# Patient Record
Sex: FEMALE | Race: WHITE | NOT HISPANIC OR LATINO | Employment: UNEMPLOYED | URBAN - METROPOLITAN AREA
[De-identification: names, ages, dates, MRNs, and addresses within clinical notes are randomized per-mention and may not be internally consistent; named-entity substitution may affect disease eponyms.]

---

## 2018-04-01 LAB
ANION GAP SERPL CALCULATED.3IONS-SCNC: 11.7 MM/L
APTT PPP: 30.1 SEC (ref 24.4–37.6)
BASOPHILS # BLD AUTO: 0.1 X3/UL (ref 0–0.3)
BASOPHILS # BLD AUTO: 0.8 % (ref 0–2)
BILIRUB UR QL STRIP: NEGATIVE
BUN SERPL-MCNC: 16 MG/DL (ref 7–25)
CALCIUM SERPL-MCNC: 9.3 MG/DL (ref 8.6–10.5)
CHLORIDE SERPL-SCNC: 108 MM/L (ref 98–107)
CLARITY UR: CLEAR
CO2 SERPL-SCNC: 22 MM/L (ref 21–31)
COLOR UR: YELLOW
CREAT SERPL-MCNC: 0.96 MG/DL (ref 0.6–1.2)
DEPRECATED RDW RBC AUTO: 12.9 %
EGFR (HISTORICAL): > 60 GFR
EGFR AFRICAN AMERICAN (HISTORICAL): > 60 GFR
EOSINOPHIL # BLD AUTO: 0.5 X3/UL (ref 0–0.5)
EOSINOPHIL NFR BLD AUTO: 5.9 % (ref 0–5)
GLUCOSE (HISTORICAL): 103 MG/DL (ref 65–99)
GLUCOSE UR STRIP-MCNC: NEGATIVE MG/DL
HCT VFR BLD AUTO: 38.1 % (ref 37–47)
HGB BLD-MCNC: 12.9 G/DL (ref 12–16)
HGB UR QL STRIP.AUTO: NEGATIVE
INR PPP: 0.93 (ref 0.9–1.5)
KETONES UR STRIP-MCNC: NEGATIVE MG/DL
LEUKOCYTE ESTERASE UR QL STRIP: NEGATIVE
LYMPHOCYTES # BLD AUTO: 3 X3/UL (ref 1.2–4.2)
LYMPHOCYTES NFR BLD AUTO: 36.8 % (ref 20.5–51.1)
MCH RBC QN AUTO: 29.7 PG (ref 26–34)
MCHC RBC AUTO-ENTMCNC: 33.9 G/DL (ref 31–37)
MCV RBC AUTO: 87.5 FL (ref 81–99)
MONOCYTES # BLD AUTO: 0.5 X3/UL (ref 0–1)
MONOCYTES NFR BLD AUTO: 5.9 % (ref 1.7–12)
NEUTROPHILS # BLD AUTO: 4.1 X3/UL (ref 1.4–6.5)
NEUTS SEG NFR BLD AUTO: 50.6 % (ref 42.2–75.2)
NITRITE UR QL STRIP: NEGATIVE
OSMOLALITY, SERUM (HISTORICAL): 277 MOSM (ref 262–291)
PH UR STRIP.AUTO: 5.5 [PH] (ref 4.5–8)
PLATELET # BLD AUTO: 293 X3/UL (ref 130–400)
PMV BLD AUTO: 7.1 FL
POTASSIUM SERPL-SCNC: 3.7 MM/L (ref 3.5–5.5)
PROT UR STRIP-MCNC: NEGATIVE MG/DL
PROTHROMBIN TIME (HISTORICAL): 10.7 SEC (ref 10.1–12.9)
RBC # BLD AUTO: 4.35 X6/UL (ref 3.9–5.2)
SODIUM SERPL-SCNC: 138 MM/L (ref 134–143)
SP GR UR STRIP.AUTO: >= 1.03 (ref 1–1.03)
UROBILINOGEN UR QL STRIP.AUTO: 0.2 EU/DL (ref 0.2–8)
WBC # BLD AUTO: 8.2 X3/UL (ref 4.8–10.8)

## 2018-05-05 ENCOUNTER — OFFICE VISIT (OUTPATIENT)
Dept: URGENT CARE | Facility: CLINIC | Age: 48
End: 2018-05-05
Payer: COMMERCIAL

## 2018-05-05 VITALS
HEART RATE: 58 BPM | SYSTOLIC BLOOD PRESSURE: 110 MMHG | OXYGEN SATURATION: 100 % | DIASTOLIC BLOOD PRESSURE: 59 MMHG | RESPIRATION RATE: 16 BRPM | TEMPERATURE: 96.8 F

## 2018-05-05 DIAGNOSIS — H66.93 BILATERAL OTITIS MEDIA, UNSPECIFIED OTITIS MEDIA TYPE: Primary | ICD-10-CM

## 2018-05-05 PROCEDURE — 99213 OFFICE O/P EST LOW 20 MIN: CPT | Performed by: PHYSICIAN ASSISTANT

## 2018-05-05 RX ORDER — AMLODIPINE BESYLATE 5 MG/1
5 TABLET ORAL DAILY
COMMUNITY
End: 2018-09-18

## 2018-05-05 RX ORDER — TOPIRAMATE 25 MG/1
50 TABLET ORAL
COMMUNITY
End: 2019-06-12

## 2018-05-05 RX ORDER — PRAMIPEXOLE DIHYDROCHLORIDE 0.5 MG/1
0.5 TABLET ORAL 2 TIMES DAILY
COMMUNITY

## 2018-05-05 RX ORDER — AZITHROMYCIN 250 MG/1
TABLET, FILM COATED ORAL
Qty: 6 TABLET | Refills: 0 | Status: SHIPPED | OUTPATIENT
Start: 2018-05-05 | End: 2018-05-10

## 2018-05-05 RX ORDER — FLUCONAZOLE 150 MG/1
150 TABLET ORAL ONCE
Qty: 2 TABLET | Refills: 0 | Status: SHIPPED | OUTPATIENT
Start: 2018-05-05 | End: 2018-05-05

## 2018-05-05 RX ORDER — LEVOTHYROXINE SODIUM 0.1 MG/1
100 TABLET ORAL DAILY
COMMUNITY

## 2018-09-18 ENCOUNTER — OFFICE VISIT (OUTPATIENT)
Dept: URGENT CARE | Facility: CLINIC | Age: 48
End: 2018-09-18
Payer: COMMERCIAL

## 2018-09-18 VITALS
WEIGHT: 240 LBS | HEART RATE: 60 BPM | SYSTOLIC BLOOD PRESSURE: 115 MMHG | DIASTOLIC BLOOD PRESSURE: 60 MMHG | HEIGHT: 69 IN | TEMPERATURE: 96.7 F | RESPIRATION RATE: 18 BRPM | OXYGEN SATURATION: 95 % | BODY MASS INDEX: 35.55 KG/M2

## 2018-09-18 DIAGNOSIS — J40 BRONCHITIS: Primary | ICD-10-CM

## 2018-09-18 PROCEDURE — G0382 LEV 3 HOSP TYPE B ED VISIT: HCPCS | Performed by: NURSE PRACTITIONER

## 2018-09-18 PROCEDURE — 99283 EMERGENCY DEPT VISIT LOW MDM: CPT | Performed by: NURSE PRACTITIONER

## 2018-09-18 RX ORDER — TOPIRAMATE 25 MG/1
25 TABLET ORAL
COMMUNITY
Start: 2018-09-04 | End: 2018-09-18

## 2018-09-18 RX ORDER — AZITHROMYCIN 250 MG/1
TABLET, FILM COATED ORAL
Qty: 6 TABLET | Refills: 0 | Status: SHIPPED | OUTPATIENT
Start: 2018-09-18 | End: 2018-09-22

## 2018-09-18 RX ORDER — ALBUTEROL SULFATE 90 UG/1
2 AEROSOL, METERED RESPIRATORY (INHALATION) EVERY 4 HOURS PRN
Qty: 1 INHALER | Refills: 0 | Status: SHIPPED | OUTPATIENT
Start: 2018-09-18 | End: 2018-10-18

## 2018-09-18 RX ORDER — FLUCONAZOLE 150 MG/1
150 TABLET ORAL ONCE
Qty: 1 TABLET | Refills: 0 | Status: SHIPPED | OUTPATIENT
Start: 2018-09-18 | End: 2018-09-18

## 2018-09-18 RX ORDER — AMLODIPINE BESYLATE 5 MG/1
5 TABLET ORAL
COMMUNITY
Start: 2018-04-06 | End: 2019-04-01

## 2018-09-18 RX ORDER — PRAMIPEXOLE DIHYDROCHLORIDE 0.5 MG/1
0.5 TABLET ORAL
COMMUNITY
Start: 2018-07-02 | End: 2018-09-18

## 2018-09-18 RX ORDER — LEVOTHYROXINE SODIUM 0.1 MG/1
100 TABLET ORAL
COMMUNITY
Start: 2018-09-12 | End: 2018-09-18

## 2018-09-18 NOTE — PROGRESS NOTES
[facility]        NAME: Rudolph Gonzalez is a 57 y o  female  : 1970    MRN: [id number]  DATE: 2018  TIME: 11:28 AM    Assessment and Plan   Bronchitis [J40]  1  Bronchitis  azithromycin (ZITHROMAX) 250 mg tablet    fluconazole (DIFLUCAN) 150 mg tablet    albuterol (PROVENTIL HFA,VENTOLIN HFA) 90 mcg/act inhaler         Patient Instructions     Mucinex DM as directed  Follow up with PCP in 3-5 days  Proceed to  ER if symptoms worsen  Chief Complaint     Chief Complaint   Patient presents with    Cold Like Symptoms     c/o cough and pain in her chest when she coughs started a couple of days ago         History of Present Illness       Cough   Episode onset: 2-3 days  The problem has been gradually worsening  The cough is productive of purulent sputum  Associated symptoms include nasal congestion, shortness of breath and wheezing  She has tried a beta-agonist inhaler and OTC cough suppressant for the symptoms  The treatment provided mild relief  Her past medical history is significant for bronchitis  +smoker       Review of Systems   Review of Systems   Constitutional: Negative  HENT: Negative  Eyes: Negative  Respiratory: Positive for cough, shortness of breath and wheezing  Cardiovascular: Negative  Gastrointestinal: Negative  Endocrine: Negative  Genitourinary: Negative  Musculoskeletal: Negative  Neurological: Negative  Hematological: Negative            Current Medications       Current Outpatient Prescriptions:     amLODIPine (NORVASC) 5 mg tablet, Take 5 mg by mouth, Disp: , Rfl:     levothyroxine 100 mcg tablet, Take 100 mcg by mouth daily, Disp: , Rfl:     pramipexole (MIRAPEX) 0 5 mg tablet, Take 0 25 mg by mouth 3 (three) times a day, Disp: , Rfl:     topiramate (TOPAMAX) 25 mg tablet, Take 25 mg by mouth 2 (two) times a day, Disp: , Rfl:     albuterol (PROVENTIL HFA,VENTOLIN HFA) 90 mcg/act inhaler, Inhale 2 puffs every 4 (four) hours as needed for wheezing or shortness of breath for up to 30 days, Disp: 1 Inhaler, Rfl: 0    azithromycin (ZITHROMAX) 250 mg tablet, Take 2 tablets today then 1 tablet daily x 4 days, Disp: 6 tablet, Rfl: 0    fluconazole (DIFLUCAN) 150 mg tablet, Take 1 tablet (150 mg total) by mouth once for 1 dose, Disp: 1 tablet, Rfl: 0    Current Allergies     Allergies as of 09/18/2018 - Reviewed 09/18/2018   Allergen Reaction Noted    Amoxicillin  05/05/2018    Codeine Other (See Comments) 05/05/2018    Etodolac Other (See Comments) 06/01/2018    Penicillins Other (See Comments) 05/05/2018    Sulfa antibiotics Other (See Comments) 05/05/2018            The following portions of the patient's history were reviewed and updated as appropriate: allergies, current medications, past family history, past medical history, past social history, past surgical history and problem list      Past Medical History:   Diagnosis Date    Disease of thyroid gland     Hypertension        No past surgical history on file  No family history on file  Medications have been verified  Objective   /60   Pulse 60   Temp (!) 96 7 °F (35 9 °C)   Resp 18   Ht 5' 9" (1 753 m)   Wt 109 kg (240 lb)   SpO2 95%   BMI 35 44 kg/m²        Physical Exam     Physical Exam   Constitutional: She is oriented to person, place, and time  She appears well-developed and well-nourished  No distress  HENT:   Head: Normocephalic and atraumatic  Right Ear: External ear normal    Left Ear: External ear normal    Nose: Nose normal    Mouth/Throat: Oropharynx is clear and moist    Eyes: Conjunctivae and EOM are normal  Pupils are equal, round, and reactive to light  Neck: Normal range of motion  Neck supple  Cardiovascular: Normal rate, regular rhythm and normal heart sounds  Pulmonary/Chest: Effort normal  She has wheezes in the right lower field and the left lower field  Abdominal: Soft   Bowel sounds are normal    Lymphadenopathy: She has cervical adenopathy  Neurological: She is alert and oriented to person, place, and time  She has normal reflexes  Skin: Skin is warm and dry  She is not diaphoretic  Psychiatric: She has a normal mood and affect  Her behavior is normal  Judgment and thought content normal    Nursing note and vitals reviewed

## 2018-09-18 NOTE — PATIENT INSTRUCTIONS
Acute Bronchitis   WHAT YOU NEED TO KNOW:   Acute bronchitis is swelling and irritation in the air passages of your lungs  This irritation may cause you to cough or have other breathing problems  Acute bronchitis often starts because of another illness, such as a cold or the flu  The illness spreads from your nose and throat to your windpipe and airways  Bronchitis is often called a chest cold  Acute bronchitis lasts about 3 to 6 weeks and is usually not a serious illness  Your cough can last for several weeks  DISCHARGE INSTRUCTIONS:   Return to the emergency department if:   · You cough up blood  · Your lips or fingernails turn blue  · You feel like you are not getting enough air when you breathe  Contact your healthcare provider if:   · You have a fever  · Your breathing problems do not go away or get worse  · Your cough does not get better within 4 weeks  · You have questions or concerns about your condition or care  Self-care:   · Get more rest   Rest helps your body to heal  Slowly start to do more each day  Rest when you feel it is needed  · Avoid irritants in the air  Avoid chemicals, fumes, and dust  Wear a face mask if you must work around dust or fumes  Stay inside on days when air pollution levels are high  If you have allergies, stay inside when pollen counts are high  Do not use aerosol products, such as spray-on deodorant, bug spray, and hair spray  · Do not smoke or be around others who smoke  Nicotine and other chemicals in cigarettes and cigars damages the cilia that move mucus out of your lungs  Ask your healthcare provider for information if you currently smoke and need help to quit  E-cigarettes or smokeless tobacco still contain nicotine  Talk to your healthcare provider before you use these products  · Drink liquids as directed  Liquids help keep your air passages moist and help you cough up mucus   You may need to drink more liquids when you have acute bronchitis  Ask how much liquid to drink each day and which liquids are best for you  · Use a humidifier or vaporizer  Use a cool mist humidifier or a vaporizer to increase air moisture in your home  This may make it easier for you to breathe and help decrease your cough  Decrease risk for acute bronchitis:   · Get the vaccinations you need  Ask your healthcare provider if you should get vaccinated against the flu or pneumonia  · Prevent the spread of germs  You can decrease your risk of acute bronchitis and other illnesses by doing the following:     [facility] your hands often with soap and water  Carry germ-killing hand lotion or gel with you  You can use the lotion or gel to clean your hands when soap and water are not available  ¨ Do not touch your eyes, nose, or mouth unless you have washed your hands first     ¨ Always cover your mouth when you cough to prevent the spread of germs  It is best to cough into a tissue or your shirt sleeve instead of into your hand  Ask those around you cover their mouths when they cough  ¨ Try to avoid people who have a cold or the flu  If you are sick, stay away from others as much as possible  Medicines: Your healthcare provider may  give you any of the following:  · Ibuprofen or acetaminophen  are medicines that help lower your fever  They are available without a doctor's order  Ask your healthcare provider which medicine is right for you  Ask how much to take and how often to take it  Follow directions  These medicines can cause stomach bleeding if not taken correctly  Ibuprofen can cause kidney damage  Do not take ibuprofen if you have kidney disease, an ulcer, or allergies to aspirin  Acetaminophen can cause liver damage  Do not take more than 4,000 milligrams in 24 hours  · Decongestants  help loosen mucus in your lungs and make it easier to cough up  This can help you breathe easier  · Cough suppressants  decrease your urge to cough   If your cough produces mucus, do not take a cough suppressant unless your healthcare provider tells you to  Your healthcare provider may suggest that you take a cough suppressant at night so you can rest     · Inhalers  may be given  Your healthcare provider may give you one or more inhalers to help you breathe easier and cough less  An inhaler gives your medicine to open your airways  Ask your healthcare provider to show you how to use your inhaler correctly  · Take your medicine as directed  Contact your healthcare provider if you think your medicine is not helping or if you have side effects  Tell him of her if you are allergic to any medicine  Keep a list of the medicines, vitamins, and herbs you take  Include the amounts, and when and why you take them  Bring the list or the pill bottles to follow-up visits  Carry your medicine list with you in case of an emergency  Follow up with your healthcare provider as directed:  Write down questions you have so you will remember to ask them during your follow-up visits  © 2017 [employer] Information is for End User's use only and may not be sold, redistributed or otherwise used for commercial purposes  All illustrations and images included in CareNotes® are the copyrighted property of [employer]  or [employer]  The above information is an  only  It is not intended as medical advice for individual conditions or treatments  Talk to your doctor, nurse or pharmacist before following any medical regimen to see if it is safe and effective for you

## 2018-09-19 ENCOUNTER — APPOINTMENT (OUTPATIENT)
Dept: RADIOLOGY | Facility: CLINIC | Age: 48
End: 2018-09-19
Payer: COMMERCIAL

## 2018-09-19 ENCOUNTER — OFFICE VISIT (OUTPATIENT)
Dept: URGENT CARE | Facility: CLINIC | Age: 48
End: 2018-09-19
Payer: COMMERCIAL

## 2018-09-19 VITALS
TEMPERATURE: 97.5 F | HEART RATE: 67 BPM | OXYGEN SATURATION: 98 % | RESPIRATION RATE: 18 BRPM | SYSTOLIC BLOOD PRESSURE: 125 MMHG | DIASTOLIC BLOOD PRESSURE: 66 MMHG

## 2018-09-19 DIAGNOSIS — J20.0 ACUTE BRONCHITIS DUE TO MYCOPLASMA PNEUMONIAE: Primary | ICD-10-CM

## 2018-09-19 DIAGNOSIS — H73.012 BULLOUS MYRINGITIS OF LEFT EAR: ICD-10-CM

## 2018-09-19 DIAGNOSIS — R05.9 COUGH: ICD-10-CM

## 2018-09-19 PROCEDURE — 99283 EMERGENCY DEPT VISIT LOW MDM: CPT | Performed by: PHYSICIAN ASSISTANT

## 2018-09-19 PROCEDURE — G0382 LEV 3 HOSP TYPE B ED VISIT: HCPCS | Performed by: PHYSICIAN ASSISTANT

## 2018-09-19 PROCEDURE — 94640 AIRWAY INHALATION TREATMENT: CPT | Performed by: PHYSICIAN ASSISTANT

## 2018-09-19 PROCEDURE — 71046 X-RAY EXAM CHEST 2 VIEWS: CPT

## 2018-09-19 RX ORDER — PREDNISONE 10 MG/1
TABLET ORAL
Qty: 26 TABLET | Refills: 0 | Status: SHIPPED | OUTPATIENT
Start: 2018-09-19 | End: 2019-01-14

## 2018-09-19 RX ORDER — IPRATROPIUM BROMIDE AND ALBUTEROL SULFATE 2.5; .5 MG/3ML; MG/3ML
3 SOLUTION RESPIRATORY (INHALATION) ONCE
Status: COMPLETED | OUTPATIENT
Start: 2018-09-19 | End: 2018-09-19

## 2018-09-19 RX ADMIN — IPRATROPIUM BROMIDE AND ALBUTEROL SULFATE 3 ML: 2.5; .5 SOLUTION RESPIRATORY (INHALATION) at 19:55

## 2018-09-19 NOTE — PROGRESS NOTES
[facility]    NAME: Morgan Maya is a 57 y o  female  : 1970    MRN: [id number]  DATE: 2018  TIME: 8:12 PM    Assessment and Plan   Acute bronchitis due to Mycoplasma pneumoniae [J20 0]  1  Acute bronchitis due to Mycoplasma pneumoniae  predniSONE 10 mg tablet   2  Cough  XR chest pa & lateral    ipratropium-albuterol (DUO-NEB) 0 5-2 5 mg/3 mL inhalation solution 3 mL   3  Bullous myringitis of left ear     Patient was given DuoNeb treatment in the office today  Pulse ox prior to treatment was 98%  Post treatment was 99%  Patient reported improved symptoms post treatment  Exam revealed lungs CTA  Patient Instructions   Patient Instructions   Continue antibiotic  Start prednisone and take as directed  Use albuterol inhaler every 4-6 hours as needed  If symptoms are worsening, go to the emergency room  X-ray appears negative  Will follow up with radiologist report when available  Suspect mycoplasma due to bullous myringitis  Chief Complaint     Chief Complaint   Patient presents with    Cough     Pt c/o a cough and congestion  History of Present Illness   53-year-old female here with complaint of worsening cough, shortness of breath and chest tightness  Patient states she started Zithromax yesterday  Does not feel like her albuterol inhaler is helping much  She used it 5 times in the last hour or 2 and does not feel like it helped  Cough is productive at times  Also has a slight left earache  Review of Systems   Review of Systems   Constitutional: Positive for fatigue  Negative for activity change, appetite change, chills, diaphoresis, fever and unexpected weight change  HENT: Positive for congestion, ear pain and sore throat  Negative for dental problem, hearing loss, sinus pressure, sneezing, tinnitus, trouble swallowing and voice change  Eyes: Negative for photophobia, redness and visual disturbance     Respiratory: Positive for cough, chest tightness, shortness of breath and wheezing  Negative for apnea and stridor  Cardiovascular: Negative for chest pain, palpitations and leg swelling  Gastrointestinal: Negative for abdominal distention, abdominal pain, blood in stool, constipation, diarrhea, nausea and vomiting  Endocrine: Negative for cold intolerance, heat intolerance, polydipsia, polyphagia and polyuria  Genitourinary: Negative for difficulty urinating, dysuria, flank pain, frequency, hematuria and urgency  Musculoskeletal: Negative for arthralgias, back pain, gait problem, joint swelling, myalgias, neck pain and neck stiffness  Skin: Negative for pallor, rash and wound  Neurological: Negative for dizziness, tremors, seizures, speech difficulty, weakness and headaches  Hematological: Negative for adenopathy  Does not bruise/bleed easily  Psychiatric/Behavioral: Negative for agitation, confusion, dysphoric mood and sleep disturbance  The patient is not nervous/anxious  All other systems reviewed and are negative  Current Medications     Current Outpatient Prescriptions:     albuterol (PROVENTIL HFA,VENTOLIN HFA) 90 mcg/act inhaler, Inhale 2 puffs every 4 (four) hours as needed for wheezing or shortness of breath for up to 30 days, Disp: 1 Inhaler, Rfl: 0    amLODIPine (NORVASC) 5 mg tablet, Take 5 mg by mouth, Disp: , Rfl:     azithromycin (ZITHROMAX) 250 mg tablet, Take 2 tablets today then 1 tablet daily x 4 days, Disp: 6 tablet, Rfl: 0    levothyroxine 100 mcg tablet, Take 100 mcg by mouth daily, Disp: , Rfl:     pramipexole (MIRAPEX) 0 5 mg tablet, Take 0 25 mg by mouth 3 (three) times a day, Disp: , Rfl:     predniSONE 10 mg tablet, Take 3 tabs BID X 2 days, 2 tabs BID X 2 days, 1 tab BID X 2 days, 1 tab daily X 2 days, Disp: 26 tablet, Rfl: 0    topiramate (TOPAMAX) 25 mg tablet, Take 25 mg by mouth 2 (two) times a day, Disp: , Rfl:   No current facility-administered medications for this visit  Current Allergies     Allergies as of 09/19/2018 - Reviewed 09/19/2018   Allergen Reaction Noted    Amoxicillin  05/05/2018    Codeine Other (See Comments) 05/05/2018    Etodolac Other (See Comments) 06/01/2018    Penicillins Other (See Comments) 05/05/2018    Sulfa antibiotics Other (See Comments) 05/05/2018          The following portions of the patient's history were reviewed and updated as appropriate: allergies, current medications, past family history, past medical history, past social history, past surgical history and problem list    Past Medical History:   Diagnosis Date    Disease of thyroid gland     Hypertension      No past surgical history on file  No family history on file  Social History     Social History    Marital status: Legally      Spouse name: N/A    Number of children: N/A    Years of education: N/A     Occupational History    Not on file  Social History Main Topics    Smoking status: Not on file    Smokeless tobacco: Not on file    Alcohol use Not on file    Drug use: Unknown    Sexual activity: Not on file     Other Topics Concern    Not on file     Social History Narrative    No narrative on file     Medications have been verified  Objective   /66   Pulse 67   Temp 97 5 °F (36 4 °C)   Resp 18   SpO2 98%      Physical Exam   Physical Exam   Constitutional: She appears well-developed and well-nourished  No distress  HENT:   Head: Normocephalic  Right Ear: Tympanic membrane and external ear normal    Left Ear: External ear normal  Tympanic membrane is erythematous (bullous myringitis)  Nose: Mucosal edema present  Mouth/Throat: Posterior oropharyngeal erythema present  No oropharyngeal exudate  Neck: Normal range of motion  Neck supple  Cardiovascular: Normal rate, regular rhythm and normal heart sounds  No murmur heard  Pulmonary/Chest: Effort normal  No respiratory distress  She has wheezes (scattered)  She has no rales  Abdominal: Soft  Bowel sounds are normal  There is no tenderness  Musculoskeletal: Normal range of motion  Lymphadenopathy:     She has no cervical adenopathy  Skin: Skin is warm  No rash noted  Nursing note and vitals reviewed

## 2018-09-20 NOTE — PATIENT INSTRUCTIONS
Continue antibiotic  Start prednisone and take as directed  Use albuterol inhaler every 4-6 hours as needed  If symptoms are worsening, go to the emergency room  X-ray appears negative  Will follow up with radiologist report when available  Suspect mycoplasma due to bullous myringitis

## 2019-01-09 ENCOUNTER — TRANSCRIBE ORDERS (OUTPATIENT)
Dept: NEUROSURGERY | Facility: CLINIC | Age: 49
End: 2019-01-09

## 2019-01-09 DIAGNOSIS — M54.2 NECK PAIN: Primary | ICD-10-CM

## 2019-01-14 ENCOUNTER — OFFICE VISIT (OUTPATIENT)
Dept: NEUROSURGERY | Facility: CLINIC | Age: 49
End: 2019-01-14
Payer: COMMERCIAL

## 2019-01-14 VITALS
HEIGHT: 69 IN | SYSTOLIC BLOOD PRESSURE: 146 MMHG | WEIGHT: 275 LBS | BODY MASS INDEX: 40.73 KG/M2 | HEART RATE: 77 BPM | DIASTOLIC BLOOD PRESSURE: 82 MMHG | RESPIRATION RATE: 16 BRPM

## 2019-01-14 DIAGNOSIS — G56.20 ULNAR NEUROPATHY, UNSPECIFIED LATERALITY: ICD-10-CM

## 2019-01-14 DIAGNOSIS — M54.2 NECK PAIN: ICD-10-CM

## 2019-01-14 DIAGNOSIS — M54.2 CERVICALGIA: Primary | ICD-10-CM

## 2019-01-14 DIAGNOSIS — M54.12 RADICULOPATHY, CERVICAL: ICD-10-CM

## 2019-01-14 PROCEDURE — 99244 OFF/OP CNSLTJ NEW/EST MOD 40: CPT | Performed by: NEUROLOGICAL SURGERY

## 2019-01-14 RX ORDER — CHOLECALCIFEROL (VITAMIN D3) 50 MCG
1 TABLET ORAL
COMMUNITY
End: 2019-04-26

## 2019-01-14 RX ORDER — FERROUS SULFATE 325(65) MG
325 TABLET ORAL
COMMUNITY

## 2019-01-14 RX ORDER — GABAPENTIN 300 MG/1
300 CAPSULE ORAL
COMMUNITY
Start: 2018-12-28 | End: 2019-04-01

## 2019-01-14 RX ORDER — METHOCARBAMOL 500 MG/1
500 TABLET, FILM COATED ORAL 3 TIMES DAILY PRN
COMMUNITY
End: 2019-04-10

## 2019-01-14 NOTE — PROGRESS NOTES
Assessment/Plan:    No problem-specific Assessment & Plan notes found for this encounter  Problem List Items Addressed This Visit     None      Visit Diagnoses     Cervicalgia    -  Primary    Relevant Orders    EMG 2 Limb Upper Extremity    XR spine cervical complete 6+ vw flex/ext/obl    Ambulatory referral to Pain Management    Ambulatory referral to Physical Therapy    Neck pain        Relevant Orders    EMG 2 Limb Upper Extremity    XR spine cervical complete 6+ vw flex/ext/obl    Ambulatory referral to Pain Management    Ambulatory referral to Physical Therapy    Radiculopathy, cervical        Relevant Orders    EMG 2 Limb Upper Extremity    XR spine cervical complete 6+ vw flex/ext/obl    Ambulatory referral to Pain Management    Ambulatory referral to Physical Therapy    Ulnar neuropathy, unspecified laterality        Relevant Orders    EMG 2 Limb Upper Extremity    XR spine cervical complete 6+ vw flex/ext/obl    Ambulatory referral to Pain Management    Ambulatory referral to Physical Therapy            Subjective:      Patient ID: Deanna Dent is a 57 y o  female  HPI    The following portions of the patient's history were reviewed and updated as appropriate: allergies, current medications, past family history, past medical history, past social history, past surgical history and problem list     Review of Systems   Constitutional: Positive for fatigue  Negative for chills and fever  HENT: Negative  Eyes: Negative for pain and visual disturbance  Respiratory: Negative for cough, shortness of breath and wheezing  Cardiovascular: Negative for chest pain and palpitations  Gastrointestinal: Positive for nausea  Negative for abdominal pain  Genitourinary: Negative for difficulty urinating  Musculoskeletal: Positive for arthralgias, neck pain and neck stiffness  Negative for back pain and gait problem  Neurological: Positive for numbness and headaches   Negative for dizziness, speech difficulty and weakness  Objective:      /82 (BP Location: Left arm, Patient Position: Sitting, Cuff Size: Standard)   Pulse 77   Resp 16   Ht 5' 9" (1 753 m)   Wt 125 kg (275 lb)   BMI 40 61 kg/m²          Physical Exam      HPI    3 months hx of neck pain and stiffness associated with right shoulder and arm pain/paresthesia radiating to ulnar fingers  Denies weakness, loss of fine motor, gait disturbance  NATACHA x 2 without durable relief   and car provider  Exam    Moderate restriction in cervical ROM  Paravertebral spasm  Negative spurling's  Full strength bilateral UE and LE  Grossly intact sensation and DTR  Intact fine motor and coordination  Normal gait and tandem  Positive tinel's cubital tunnel right side    Radiology    C4-7 disc degeneration, markedly so at C5/6 with concentric disc bulge and collapse with bilateral foraminal encroachment  Thecal sac indentation without cord compression    Summary and Plan     Mrs Barger has neck pain and arm pain  We reviewed the conservative medical options including PT, NATACHA and medications  I explained to her that although she does indeed have C5/6 disc degeneration, the pattern of her symptom is somewhat atypical for a classic C6 radiculopathy  I have provided her with a script for PT, order a flex/ext xray, EMG and referred her to Dr Letitia Jane for further appraisal  In the meantime, I asked her to avoid axial loading, vigorous head and neck movement and heavy lifting  I will see her in fu in 3 months

## 2019-01-22 ENCOUNTER — EVALUATION (OUTPATIENT)
Dept: PHYSICAL THERAPY | Facility: CLINIC | Age: 49
End: 2019-01-22
Payer: COMMERCIAL

## 2019-01-22 DIAGNOSIS — G56.20 ULNAR NEUROPATHY, UNSPECIFIED LATERALITY: ICD-10-CM

## 2019-01-22 DIAGNOSIS — M54.2 NECK PAIN: Primary | ICD-10-CM

## 2019-01-22 DIAGNOSIS — M54.12 RADICULOPATHY, CERVICAL: ICD-10-CM

## 2019-01-22 DIAGNOSIS — M54.2 CERVICALGIA: ICD-10-CM

## 2019-01-22 PROCEDURE — 97535 SELF CARE MNGMENT TRAINING: CPT | Performed by: PHYSICAL THERAPIST

## 2019-01-22 PROCEDURE — G8982 BODY POS GOAL STATUS: HCPCS | Performed by: PHYSICAL THERAPIST

## 2019-01-22 PROCEDURE — 97162 PT EVAL MOD COMPLEX 30 MIN: CPT | Performed by: PHYSICAL THERAPIST

## 2019-01-22 PROCEDURE — G8981 BODY POS CURRENT STATUS: HCPCS | Performed by: PHYSICAL THERAPIST

## 2019-01-22 NOTE — PROGRESS NOTES
PT Evaluation     Today's date: 2019  Patient name: Vashti Byers  : 1970  MRN: [id number]  Referring provider: Tiffanie Jensen MD  Dx:   Encounter Diagnosis     ICD-10-CM    1  Neck pain M54 2    2  Cervicalgia M54 2    3  Radiculopathy, cervical M54 12    4  Ulnar neuropathy, unspecified laterality G56 20        Start Time: 1300  Stop Time: 1400  Total time in clinic (min): 60 minutes    Assessment  Assessment details: Vashti Byers was seen for an initial PT evaluation today  Patient is a 57 y o  female with diagnosis of cervical radiculopathy into ulnar nerve distribution and past medical history significant for left ankle reconstruction, depression, obesity  Moderate complexity evaluation  due to number of participation restrictions, functional outcome measure of 52% limitation, and changing clinical presentation  Findings today show limited cervical and shoulder AROM with right shoulder and  weakness, guarding and tightness of cervical and shoulder musculature, as well as neural tension of ulnar nerve  Skilled PT indicated to treat at this time  Impairments: abnormal muscle firing, abnormal muscle tone, abnormal or restricted ROM, activity intolerance, impaired physical strength, lacks appropriate home exercise program, pain with function, poor posture  and poor body mechanics    Goals  STG (4 weeks)  1  Patient will display good seated posture with decreased forward head and retracted shoulders for 2 consecutive sessions with 50% VC    2  Patient will have 0/10 cervical pain at rest    3  Improved right  strength to equal bilaterally for improved ADL function  LTG (8 weeks)  1  Patient will have <2 HA in 2 weeks  2  Patient will be able to care for disabled daughter with <4/10 pain for return to PLOF  3  Patient will be independent with home exercise program for continued maintenance post PT DC         Plan  Patient would benefit from: skilled physical therapy  Planned modality interventions: unattended electrical stimulation, thermotherapy: hydrocollator packs and cryotherapy  Planned therapy interventions: manual therapy, patient education, therapeutic activities, therapeutic exercise, home exercise program and neuromuscular re-education  Frequency: 2-3x week  Duration in weeks: 8  Plan of Care beginning date: 2019  Plan of Care expiration date: 3/22/2019  Treatment plan discussed with: PTA and patient        Subjective Evaluation    History of Present Illness  Mechanism of injury: Patient presents to outpatient PT with c/o cervical and right arm pain  Symptoms began 2018  Patient states she was lying on couch and felt a pop and had warmth go into head with difficulty moving  This lasted approximately 15 minutes and patient went to ED  Patient then saw neurosurgeon who referred patient to pain management and PT  Has had 2 injections only lasting approximately 1 week at a time  Changed neurosurgeons recently and was sent to begin PT and an EMG early February  Will also be switching main management, new pain management to administer injection once able  Will f/u with neurosurgeon in 2019  Imaging (+) C4-7 disc degeneration, markedly so at C5/6 with concentric disc bulge and collapse with bilateral foraminal encroachment  Thecal sac indentation without cord compression    Restrictions per MD:Avoid axial loading, vigorous head and neck movement and heavy lifting  Not a recurrent problem   Pain  Current pain ratin  At best pain rating: 3  At worst pain ratin  Location: sub occipital region down between shoulder blades and into right shoulder   Numbness at medial rigth UE to 4th and 5th digits  Quality: burning, radiating and knife-like  Relieving factors: medications and heat  Aggravating factors: lifting  Progression: no change    Social Support  Steps to enter house: yes  3  Stairs in house: yes   24  Lives in: multiple-level home  Lives with: adult children and significant other    Employment status: not working (care of disabled daughter)  Hand dominance: right  Exercise history: Crossfit workouts prior to injury  Life stress: Disabled daughter    Treatments  Previous treatment: injection treatment and medication  Patient Goals  Patient goals for therapy: decreased pain and increased motion  Patient goal: "I want to get some of my life back"  Would like to return to crossfit  Objective     Special Questions  Positive for dizziness and headaches  Negative for faints, tinnitus, trouble swallowing and visual change    Postural Observations  Seated posture: poor  Standing posture: fair    Additional Postural Observation Details  Upper crossed    Palpation     Right   Muscle spasm in the levator scapulae and upper trapezius  Neurological Testing     Sensation   Cervical/Thoracic   Left   Intact: light touch    Right   Diminished: light touch    Comments   Right light touch: C8  Active Range of Motion   Cervical/Thoracic Spine   Cervical    Flexion: 60 (pain into T/S) degrees with pain  Extension: 40 degrees   Left lateral flexion: 32 degrees   Right lateral flexion: 34 degrees   Left rotation: 75 degrees   Right rotation: 70 degrees with pain  Left Shoulder   Flexion: 157 degrees   Abduction: 156 (pull between scapulas) degrees     Right Shoulder   Flexion: 143 degrees   Abduction: 146 degrees     Additional Active Range of Motion Details  IR and ER BTH/BTB equal and WNL bilaterally       Joint Play   Comments: Cervical PA and side glide mobility WNL  Bilateral 1st rib mobility WNL    Strength/Myotome Testing     Left Shoulder     Planes of Motion   Flexion: 4+   Abduction: 4   External rotation at 0°: 5   Internal rotation at 0°: 5     Right Shoulder     Planes of Motion   Flexion: 5   Abduction: 4+   External rotation at 0°: 5   Internal rotation at 0°: 5     Left Wrist/Hand      (2nd hand position)     Trial 1: 72    Trial 2: 69    Trial 3: 62    Right Wrist/Hand      (2nd hand position)     Trial 1: 62    Trial 2: 59    Trial 3: 54    Additional Strength Details  Bilateral elbow flexion and extension strength 5/5    Tests   Cervical     Left   Positive Spurling's sign  Right   Positive Spurling's sign  Negative cervical distraction  Right Shoulder   Positive ULTT4         Flowsheet Rows      Most Recent Value   PT/OT G-Codes   Current Score  48   Projected Score  62   FOTO information reviewed  Yes   Assessment Type  Evaluation   G code set  Changing & Maintaining Body Position   Changing and Maintaining Body Position Current Status ()  CK   Changing and Maintaining Body Position Goal Status ()  CJ          Precautions: None noted  Re-evaluation: 2/22    Shoulder Specialty Daily Treatment Diary     Manual  1/22                                                   Exercise Diary  1/22       Shoulder rolls 30x       Scap retraction 30x       Chin tucks 10x       UT stretch 3x30"       LS stretch 3x30"       Ulnar nerve flossing 10x                                                                                                                                   Modalities                                  Kellie Hoang was given a handout and verbal instruction of customized home exercise program  Patient accepted program and was able to demonstrate exercises

## 2019-01-29 ENCOUNTER — OFFICE VISIT (OUTPATIENT)
Dept: PHYSICAL THERAPY | Facility: CLINIC | Age: 49
End: 2019-01-29
Payer: COMMERCIAL

## 2019-01-29 DIAGNOSIS — M54.12 RADICULOPATHY, CERVICAL: ICD-10-CM

## 2019-01-29 DIAGNOSIS — M54.2 NECK PAIN: Primary | ICD-10-CM

## 2019-01-29 DIAGNOSIS — G56.20 ULNAR NEUROPATHY, UNSPECIFIED LATERALITY: ICD-10-CM

## 2019-01-29 DIAGNOSIS — M54.2 CERVICALGIA: ICD-10-CM

## 2019-01-29 PROCEDURE — 97140 MANUAL THERAPY 1/> REGIONS: CPT | Performed by: PHYSICAL THERAPIST

## 2019-01-29 PROCEDURE — 97110 THERAPEUTIC EXERCISES: CPT | Performed by: PHYSICAL THERAPIST

## 2019-01-29 PROCEDURE — 97014 ELECTRIC STIMULATION THERAPY: CPT | Performed by: PHYSICAL THERAPIST

## 2019-01-29 PROCEDURE — G0283 ELEC STIM OTHER THAN WOUND: HCPCS | Performed by: PHYSICAL THERAPIST

## 2019-01-29 NOTE — PROGRESS NOTES
Daily Note     Today's date: 2019  Patient name: Shahnaz Galvan  : 1970  MRN: [id number]  Referring provider: Ericka Hernandez MD  Dx:   Encounter Diagnosis     ICD-10-CM    1  Neck pain M54 2    2  Cervicalgia M54 2    3  Radiculopathy, cervical M54 12    4  Ulnar neuropathy, unspecified laterality G56 20        Start Time: 0850  Stop Time: 1000  Total time in clinic (min): 70 minutes    Subjective: Patient states she has been consistent with HEP, but gets pain during shoulder rolls and scap retractions along medial border of L>R scapula  Objective: See treatment diary below  Precautions: None noted  Re-evaluation:     Shoulder Specialty Daily Treatment Diary     Manual        PA T/S mobs   Grade III-IV      Scapular mobs bilat  performed                                  Exercise Diary        UBE  Standing 100/90 5' fwd      Shoulder rolls 30x 10x      Scap retraction 30x 10x      Chin tucks 10x 10x      UT stretch 3x30" 4x15"      LS stretch 3x30" 4x15"      Ulnar nerve flossing 10x 10x      Seated thoracic extension  10x      Doorway pec stretch  4x15"                                                                                                                  Modalities        Premod with MH  Post supine hooklying with wedge 10'                        - Shahnaz Galvan was given a handout and verbal instruction of customized home exercise program  Patient accepted program and was able to demonstrate exercises  Assessment: Significant limitation in both scapular and thoracic TP mobility impacting overall scapulothoracic rhythm possibly impacting cervical symptoms with over activation of cervical accessory musculature  Plan: Continue per plan of care  Progress treatment as tolerated

## 2019-01-31 ENCOUNTER — OFFICE VISIT (OUTPATIENT)
Dept: PHYSICAL THERAPY | Facility: CLINIC | Age: 49
End: 2019-01-31
Payer: COMMERCIAL

## 2019-01-31 DIAGNOSIS — M54.2 CERVICALGIA: ICD-10-CM

## 2019-01-31 DIAGNOSIS — G56.20 ULNAR NEUROPATHY, UNSPECIFIED LATERALITY: ICD-10-CM

## 2019-01-31 DIAGNOSIS — M54.2 NECK PAIN: Primary | ICD-10-CM

## 2019-01-31 DIAGNOSIS — M54.12 RADICULOPATHY, CERVICAL: ICD-10-CM

## 2019-01-31 PROCEDURE — 97110 THERAPEUTIC EXERCISES: CPT | Performed by: PHYSICAL THERAPIST

## 2019-01-31 PROCEDURE — 97014 ELECTRIC STIMULATION THERAPY: CPT | Performed by: PHYSICAL THERAPIST

## 2019-01-31 PROCEDURE — 97140 MANUAL THERAPY 1/> REGIONS: CPT | Performed by: PHYSICAL THERAPIST

## 2019-01-31 PROCEDURE — G0283 ELEC STIM OTHER THAN WOUND: HCPCS | Performed by: PHYSICAL THERAPIST

## 2019-01-31 NOTE — PROGRESS NOTES
Daily Note     Today's date: 2019  Patient name: Chadwick Lewis  : 1970  MRN: [id number]  Referring provider: Karen Gutierrez MD  Dx:   Encounter Diagnosis     ICD-10-CM    1  Neck pain M54 2    2  Cervicalgia M54 2    3  Radiculopathy, cervical M54 12    4  Ulnar neuropathy, unspecified laterality G56 20        Start Time: 1100  Stop Time: 1155  Total time in clinic (min): 55 minutes    Subjective: Patient states she was very sore the night after last session, but had significant decrease in symptoms the following am  Symptoms did gradually return the next afternoon  Objective: See treatment diary below  Precautions: None noted  Re-evaluation:     Shoulder Specialty Daily Treatment Diary     Manual       PA T/S mobs   Grade III-IV Grade III-IV     Scapular mobs bilat  performed Performed      [facility]                        Exercise Diary       UBE  Standing 100/90 5' fwd Standing 100/90 4' fwd, 2' retro (increase fwd, dc retro)    Shoulder rolls 30x 10x 30x     Scap retraction 30x 10x 15x     Chin tucks 10x 10x 20x     UT stretch 3x30" 4x15" 4x15"     LS stretch 3x30" 4x15" 4x15"     Ulnar nerve flossing 10x 10x 10x     Seated thoracic extension  10x 10x     Doorway pec stretch  4x15" 4x15"     Bilateral shoulder ER   10x light blue                                                                                                         Modalities       Premod with MH  Post supine hooklying with wedge 10' Post supine hooklying with wedge 10'                       - Chadwick Lewis was given a handout and verbal instruction of customized home exercise program  Patient accepted program and was able to demonstrate exercises  Increased ulnar n  symptoms with retro UBE  Assessment: Patient does show improvement today with increased mobility of scapulas and thoracic spine, but still significant limitation impacting overall symptoms  Continuation of skilled PT indicated  Plan: Continue per plan of care  Progress treatment as tolerated  Add IASTM next session

## 2019-02-05 ENCOUNTER — APPOINTMENT (OUTPATIENT)
Dept: PHYSICAL THERAPY | Facility: CLINIC | Age: 49
End: 2019-02-05
Payer: COMMERCIAL

## 2019-02-07 ENCOUNTER — APPOINTMENT (OUTPATIENT)
Dept: PHYSICAL THERAPY | Facility: CLINIC | Age: 49
End: 2019-02-07
Payer: COMMERCIAL

## 2019-02-07 ENCOUNTER — OFFICE VISIT (OUTPATIENT)
Dept: PHYSICAL THERAPY | Facility: CLINIC | Age: 49
End: 2019-02-07
Payer: COMMERCIAL

## 2019-02-07 DIAGNOSIS — M54.2 CERVICALGIA: ICD-10-CM

## 2019-02-07 DIAGNOSIS — G56.20 ULNAR NEUROPATHY, UNSPECIFIED LATERALITY: ICD-10-CM

## 2019-02-07 DIAGNOSIS — M54.12 RADICULOPATHY, CERVICAL: ICD-10-CM

## 2019-02-07 DIAGNOSIS — M54.2 NECK PAIN: Primary | ICD-10-CM

## 2019-02-07 PROCEDURE — 97140 MANUAL THERAPY 1/> REGIONS: CPT

## 2019-02-07 PROCEDURE — 97110 THERAPEUTIC EXERCISES: CPT

## 2019-02-07 NOTE — PROGRESS NOTES
Daily Note     Today's date: 2019  Patient name: Renee Umanzor  : 1970  MRN: [id number]  Referring provider: Celena Shah MD  Dx:   Encounter Diagnosis     ICD-10-CM    1  Neck pain M54 2    2  Cervicalgia M54 2    3  Radiculopathy, cervical M54 12    4  Ulnar neuropathy, unspecified laterality G56 20                   Subjective: Patient states her pain is a 2-3/10 today  The pain settles in her shoulder blades that goes into the neck  She still has the tingling down the right arm, but it has become noticeably less  Patient reports she does her home exercises x2 per day  Objective: See treatment diary below  Precautions: None noted  Re-evaluation:     Shoulder Specialty Daily Treatment Diary     Manual  19    PA T/S mobs   Grade III-IV Grade III-IV     Scapular mobs bilat  performed Performed      IASTM   nv scap boarders more on R   x 8 min                        Exercise Diary  19    UBE  Standing 100/90 5' fwd Standing 100/90 4' fwd, 2' retro Standing FWD  100/90 x6 min    Shoulder rolls 30x 10x 30x x30    Scap retraction 30x 10x 15x x15    Chin tucks 10x 10x 20x x20    UT stretch 3x30" 4x15" 4x15" 4x :15    LS stretch 3x30" 4x15" 4x15" 4x :15    Ulnar nerve flossing 10x 10x 10x x10    Seated thoracic extension  10x 10x x10    Doorway pec stretch  4x15" 4x15" 4x :15    Bilateral shoulder ER   10x light blue x15 light blue    MtP/LTP    Light blue x 15 ea                                                                                                Modalities  19    Premod with MH  Post supine hooklying with wedge 10' Post supine hooklying with wedge 10' Post supine x10 min                         Assessment: Tolerated treatment well  Patient would benefit from continued PT for stretching and strengthening  Patient was able to increase some exercises without difficulty   Patient had muscle spasms present in upper scapular ridge that was larger on right then left  She tolerated the addition of IASTM to program with some discomfort during procedure, but no residual pain  Felt good after session (1-2/10 pain)  Plan: Continue per plan of care  Progress treatment as tolerated

## 2019-02-12 ENCOUNTER — APPOINTMENT (OUTPATIENT)
Dept: PHYSICAL THERAPY | Facility: CLINIC | Age: 49
End: 2019-02-12
Payer: COMMERCIAL

## 2019-02-13 ENCOUNTER — OFFICE VISIT (OUTPATIENT)
Dept: PHYSICAL THERAPY | Facility: CLINIC | Age: 49
End: 2019-02-13
Payer: COMMERCIAL

## 2019-02-13 DIAGNOSIS — G56.20 ULNAR NEUROPATHY, UNSPECIFIED LATERALITY: ICD-10-CM

## 2019-02-13 DIAGNOSIS — M54.2 NECK PAIN: Primary | ICD-10-CM

## 2019-02-13 DIAGNOSIS — M54.2 CERVICALGIA: ICD-10-CM

## 2019-02-13 DIAGNOSIS — M54.12 RADICULOPATHY, CERVICAL: ICD-10-CM

## 2019-02-13 PROCEDURE — 97140 MANUAL THERAPY 1/> REGIONS: CPT

## 2019-02-13 PROCEDURE — 97110 THERAPEUTIC EXERCISES: CPT

## 2019-02-13 NOTE — PROGRESS NOTES
Daily Note     Today's date: 2019  Patient name: Elizabeth Villanueva  : 1970  MRN: [id number]  Referring provider: [place] Robledo MD  Dx:   Encounter Diagnosis     ICD-10-CM    1  Neck pain M54 2    2  Cervicalgia M54 2    3  Radiculopathy, cervical M54 12    4  Ulnar neuropathy, unspecified laterality G56 20                   Subjective: Patient states her pain was 7/10 on Saturday (not a good day)  She was a little sore after last treatment, but "it got worse the second day"  Today her pain is a 3/10in right scapular area  Objective: See treatment diary below  Precautions: None noted  Re-evaluation:     Shoulder Specialty Daily Treatment Diary     Manual  19   PA T/S mobs   Grade III-IV Grade III-IV     Scapular mobs bilat  performed Performed      IAS   nv scap boarders more on R   x 8 min hold    scapular/UT stretch and massage     x15 min               Exercise Diary  19   UBE  Standing 100/90 5' fwd Standing 100/90 4' fwd, 2' retro Standing FWD  100/90 x6 min Standing FWD  100/90 x7 min   Shoulder rolls 30x 10x 30x x30 x30   Scap retraction 30x 10x 15x x15 x30   Chin tucks 10x 10x 20x x20 x30   UT stretch 3x30" 4x15" 4x15" 4x :15 4x :15   LS stretch 3x30" 4x15" 4x15" 4x :15 4x :15   Ulnar nerve flossing 10x 10x 10x x10 x10   Seated thoracic extension  10x 10x x10 x15   Doorway pec stretch  4x15" 4x15" 4x :15 4x :15   Bilateral shoulder ER   10x light blue x15 light blue x15 light blue   MtP/LTP    Light blue x 15 ea Light blue x 15 ea   pec minor stretch supine     2 min                                                                                        Modalities  19   Premod with MH  Post supine hooklying with wedge 10' Post supine hooklying with wedge 10' Post supine x10 min  declined                     Patient's home exercise program updated to add pectoral minor stretch       Assessment: Tolerated treatment well  Patient would benefit from continued PT for stretching and strengthening  Patient understands instruction for new exercise  Patient's program adjusted because of pain levels from last session  She felt better by end of today's session (0/10 pain) and she was "looser"  Plan: Continue per plan of care  Progress treatment as tolerated

## 2019-02-14 ENCOUNTER — OFFICE VISIT (OUTPATIENT)
Dept: PHYSICAL THERAPY | Facility: CLINIC | Age: 49
End: 2019-02-14
Payer: COMMERCIAL

## 2019-02-14 DIAGNOSIS — M54.2 CERVICALGIA: ICD-10-CM

## 2019-02-14 DIAGNOSIS — M54.2 NECK PAIN: Primary | ICD-10-CM

## 2019-02-14 DIAGNOSIS — G56.20 ULNAR NEUROPATHY, UNSPECIFIED LATERALITY: ICD-10-CM

## 2019-02-14 DIAGNOSIS — M54.12 RADICULOPATHY, CERVICAL: ICD-10-CM

## 2019-02-14 PROCEDURE — 97110 THERAPEUTIC EXERCISES: CPT

## 2019-02-14 PROCEDURE — 97140 MANUAL THERAPY 1/> REGIONS: CPT

## 2019-02-14 NOTE — PROGRESS NOTES
Daily Note     Today's date: 2019  Patient name: Hesham Herrmann  : 1970  MRN: [id number]  Referring provider: Hung Alvarado MD  Dx:   Encounter Diagnosis     ICD-10-CM    1  Neck pain M54 2    2  Cervicalgia M54 2    3  Radiculopathy, cervical M54 12    4  Ulnar neuropathy, unspecified laterality G56 20                   Subjective: Patient states she had a little sharp shooting pain last night, but nothing like Saturday  Today she rates her pain 2/10  She also reports she is to go to pain management for a consult on   This a different pain management MD then last time  Patient continues to perform her exercises at home  Patient states she is having less and less tingling in her arms, she does not notice it most of the time        Objective: See treatment diary below  Precautions: None noted  Re-evaluation:     Shoulder Specialty Daily Treatment Diary     Manual  19   PA T/S mobs    Grade III-IV     Scapular mobs bilat   Performed      IASTM   nv scap boarders more on R   x 8 min hold    scapular/UT stretch with massage sitting x10 min    x15 min   Cervical traction/ stretches supine x5 min           Exercise Diary  19   UBE Standing FWD  100/90 x8 min  Standing 100/90 4' fwd, 2' retro Standing FWD  100/90 x6 min Standing FWD  100/90 x7 min   Shoulder rolls 2x15  30x x30 x30   Scap retraction 2x15  15x x15 x30   Chin tucks 2x15  20x x20 x30   UT stretch 4x :20  4x15" 4x :15 4x :15   LS stretch 4x :20  4x15" 4x :15 4x :15   Ulnar nerve flossing x10  10x x10 x10   Seated thoracic extension x15  10x x10 x15   Doorway pec stretch 4x :20  4x15" 4x :15 4x :15   Bilateral shoulder ER x20 light blue  10x light blue x15 light blue x15 light blue   MtP/LTP Light blue x 20 ea   Light blue x 15 ea Light blue x 15 ea   pec minor stretch supine x2 min    2 min Modalities 2/14/19 1/31 2/7/19 2/13/19   Premod with MH declined  Post supine hooklying with wedge 10' Post supine x10 min  declined                       Assessment: Tolerated treatment well  Patient would benefit from continued PT for stretching and strengthening  Patient seemed to be more mobil with neck motions toady  Patient was able to increase some exercises without difficulty  She had a little tingling in arm by end of arm bike that instantly went away when stopped  She tolerated more manual stretching and touch pressure on spasmed areas  Patient felt "looser" in neck after genital manual traction  Less tenderness and quicker recovery with back spasm during pectoral stretch today  Plan: Continue per plan of care  Progress treatment as tolerated

## 2019-02-19 ENCOUNTER — OFFICE VISIT (OUTPATIENT)
Dept: PHYSICAL THERAPY | Facility: CLINIC | Age: 49
End: 2019-02-19
Payer: COMMERCIAL

## 2019-02-19 DIAGNOSIS — M54.12 RADICULOPATHY, CERVICAL: ICD-10-CM

## 2019-02-19 DIAGNOSIS — G56.20 ULNAR NEUROPATHY, UNSPECIFIED LATERALITY: ICD-10-CM

## 2019-02-19 DIAGNOSIS — M54.2 CERVICALGIA: ICD-10-CM

## 2019-02-19 DIAGNOSIS — M54.2 NECK PAIN: Primary | ICD-10-CM

## 2019-02-19 PROCEDURE — 97140 MANUAL THERAPY 1/> REGIONS: CPT

## 2019-02-19 PROCEDURE — 97110 THERAPEUTIC EXERCISES: CPT

## 2019-02-19 NOTE — PROGRESS NOTES
Daily Note     Today's date: 2019  Patient name: Vashti Byers  : 1970  MRN: [id number]  Referring provider: Tiffanie Jensen MD  Dx:   Encounter Diagnosis     ICD-10-CM    1  Neck pain M54 2    2  Cervicalgia M54 2    3  Radiculopathy, cervical M54 12    4  Ulnar neuropathy, unspecified laterality G56 20                   Subjective: Patient states her pain level is a 3/10 today  She continues to have sharp shooting pain intermittent  Since last session  Overall she feels to be improving in her neck  Her low back has also been giving her pain down the legs (encouraged her to call MD about)        Objective: See treatment diary below  Precautions: None noted  Re-evaluation:     Shoulder Specialty Daily Treatment Diary     Manual  19   PA T/S mobs         Scapular mobs bilat        IASTM    scap boarders more on R   x 8 min hold    scapular/UT stretch with massage sitting x10 min x10 min   x15 min   Cervical traction/ stretches supine x5 min x5 min          Exercise Diary  19   UBE Standing FWD  100/90 x8 min Standing FWD  100/90 x9 min  Standing FWD  100/90 x6 min Standing FWD  100/90 x7 min   Shoulder rolls 2x15 x30  x30 x30   Scap retraction 2x15 x30  x15 x30   Chin tucks 2x15 x30  x20 x30   UT stretch 4x :20 4x :20  4x :15 4x :15   LS stretch 4x :20 4x :20  4x :15 4x :15   Ulnar nerve flossing x10 x10  x10 x10   Seated thoracic extension x15 x20  x10 x15   Doorway pec stretch 4x :20 4x :20  4x :15 4x :15   Bilateral shoulder ER x20 light blue x25 light blue  x15 light blue x15 light blue   MtP/LTP Light blue x 20 ea x30 light blue  Light blue x 15 ea Light blue x 15 ea   pec minor stretch supine x2 min x2 min   2 min    Towel cervical stretch (swag)  x10 ea       Stand I,T,Y  x10 I,Y  x2 T (pain)                                                                          Modalities 19   Premod with MH declined   Post supine x10 min  declined                     Patient performed Foto today  Patient's home exercise program updated to add additional exercises  Handout given and explained  Assessment: Tolerated treatment well  Patient would benefit from continued PT for stretching and strengthening  Patient understood new exercises and liked them  She was able to perform her exercises without difficulty  She had pain with discomfort with T exercise, so it was held during today's session  Patient seemed looser and more mobile after today's session (rated her pain 1/10)  Plan: Continue per plan of care  Progress treatment as tolerated  Patient to be re-evaluated by PT next session

## 2019-02-21 ENCOUNTER — EVALUATION (OUTPATIENT)
Dept: PHYSICAL THERAPY | Facility: CLINIC | Age: 49
End: 2019-02-21
Payer: COMMERCIAL

## 2019-02-21 DIAGNOSIS — M54.2 CERVICALGIA: ICD-10-CM

## 2019-02-21 DIAGNOSIS — M54.12 RADICULOPATHY, CERVICAL: ICD-10-CM

## 2019-02-21 DIAGNOSIS — M54.2 NECK PAIN: Primary | ICD-10-CM

## 2019-02-21 PROCEDURE — 97140 MANUAL THERAPY 1/> REGIONS: CPT | Performed by: PHYSICAL THERAPIST

## 2019-02-21 PROCEDURE — 97110 THERAPEUTIC EXERCISES: CPT | Performed by: PHYSICAL THERAPIST

## 2019-02-21 NOTE — PROGRESS NOTES
PT Re-Evaluation     Today's date: 2019  Patient name: Kellie Hoang  : 1970  MRN: [id number]  Referring provider: Eriberto Figueredo MD  Dx:   Encounter Diagnosis     ICD-10-CM    1  Neck pain M54 2    2  Cervicalgia M54 2    3  Radiculopathy, cervical M54 12                   Assessment  Assessment details: Kellie Hoang is a 54 y o  female that has attended 7 sessions of physical therapy  The patient has made functional gains since starting therapy  The patient is now able to perform housework and care for her daughter with less right arm pain  The patient's frequency of headaches has decreased from daily to 2x/week  The patient continues to have difficulty with vacuuming and repetitive movements  The patient will benefit from continued skilled PT to address impairments, work towards goals, and restore patient PLOF  Impairments: abnormal muscle firing, abnormal muscle tone, abnormal or restricted ROM, activity intolerance, impaired physical strength, lacks appropriate home exercise program, pain with function, poor posture  and poor body mechanics    Goals  STG (4 weeks)  1  Patient will display good seated posture with decreased forward head and retracted shoulders for 2 consecutive sessions with 50% VC  MET  2  Patient will have 0/10 cervical pain at rest  MET  3  Improved right  strength to equal bilaterally for improved ADL function  MET  LTG (8 weeks)  1  Patient will have <2 HA in 2 weeks  PROGRESSING  2  Patient will be able to care for disabled daughter with <4/10 pain for return to PLOF  PROGRESSING  3  Patient will be independent with home exercise program for continued maintenance post PT DC  PROGRESSING      Plan  Plan details:  Will continue 1x/week for 2-3 weeks and then discharge to an independent home program   Patient would benefit from: skilled physical therapy  Planned modality interventions: unattended electrical stimulation, thermotherapy: hydrocollator packs and cryotherapy  Planned therapy interventions: manual therapy, patient education, therapeutic activities, therapeutic exercise, home exercise program and neuromuscular re-education  Frequency: 1x week  Duration in weeks: 3  Plan of Care beginning date: 2019  Plan of Care expiration date: 3/14/2019  Treatment plan discussed with: PTA and patient        Subjective Evaluation    History of Present Illness  Mechanism of injury: SUBJECTIVE:  The patient is still having neck pain but notes a decrease in tingling down the right arm and fingers  The patient has been compliant with her home exercises  The patient notes her sitting posture is improved compared to when she first started therapy  The patient notes the frequency of her headaches is now down to 2 headache's per week versus daily headaches  INJURY HISTORY: Patient presents to outpatient PT with c/o cervical and right arm pain  Symptoms began 2018  Patient states she was lying on couch and felt a pop and had warmth go into head with difficulty moving  This lasted approximately 15 minutes and patient went to ED  Patient then saw neurosurgeon who referred patient to pain management and PT  Has had 2 injections only lasting approximately 1 week at a time  Changed neurosurgeons recently and was sent to begin PT and an EMG early February  Will also be switching main management, new pain management to administer injection once able  Will f/u with neurosurgeon in 2019  Imaging (+) C4-7 disc degeneration, markedly so at C5/6 with concentric disc bulge and collapse with bilateral foraminal encroachment  Thecal sac indentation without cord compression    Restrictions per MD:Avoid axial loading, vigorous head and neck movement and heavy lifting             Not a recurrent problem   Pain  Current pain rating: 3  At best pain ratin  At worst pain ratin  Location: sub occipital region down between shoulder blades   Quality: burning and knife-like  Relieving factors: medications and heat  Aggravating factors: lifting  Progression: no change    Social Support  Steps to enter house: yes  3  Stairs in house: yes   24  Lives in: multiple-level home  Lives with: adult children and significant other    Employment status: not working (care of disabled daughter)  Hand dominance: right  Exercise history: Crossfit workouts prior to injury  Life stress: Disabled daughter    Treatments  Previous treatment: injection treatment and medication  Patient Goals  Patient goals for therapy: decreased pain and increased motion  Patient goal: "I want to get some of my life back"  Would like to return to crossfit  Objective     Concurrent Complaints  Positive for headaches  Negative for dizziness, faints, tinnitus, trouble swallowing and visual change    Postural Observations  Seated posture: fair  Standing posture: fair    Additional Postural Observation Details  Upper crossed    Palpation     Right   Muscle spasm in the levator scapulae and upper trapezius  Neurological Testing     Sensation   Cervical/Thoracic   Left   Intact: light touch    Right   Intact: light touch    Active Range of Motion   Cervical/Thoracic Spine       Cervical    Flexion: 70 (pain into T/S) degrees   Extension: 50 degrees      Left lateral flexion: 35 degrees      Right lateral flexion: 45 degrees      Left rotation: 75 degrees  Right rotation: 80 degrees         Left Shoulder   Flexion: 157 degrees   Abduction: 156 (pull between scapulas) degrees     Right Shoulder   Flexion: 143 degrees   Abduction: 146 degrees     Additional Active Range of Motion Details  IR and ER BTH/BTB equal and WNL bilaterally       Joint Play     Comments: Cervical PA and side glide mobility WNL  Bilateral 1st rib mobility WNL    Strength/Myotome Testing     Left Shoulder     Planes of Motion   Flexion: 4+   Abduction: 4   External rotation at 0°: 5   Internal rotation at 0°: 5     Right Shoulder     Planes of Motion   Flexion: 5   Abduction: 4+   External rotation at 0°: 5   Internal rotation at 0°: 5     Left Wrist/Hand      (2nd hand position)     Trial 1: 72    Trial 2: 69    Trial 3: 62    Right Wrist/Hand      (2nd hand position)     Trial 1: 80    Trial 2: 79    Trial 3: 75    Additional Strength Details  Bilateral elbow flexion and extension strength 5/5    Tests   Cervical     Left   Positive Spurling's Test A  Right   Positive Spurling's Test A  Right Shoulder   Positive [id number]  Neuro Exam:     Headaches   Patient reports headaches: Yes          Objective: See treatment diary below  Precautions: None noted  Re-evaluation: 2/22    Shoulder Specialty Daily Treatment Diary     Manual  2/14/19 2/19/19 2/21/19 2/13/19   PA T/S mobs         Scapular mobs bilat        IASTM     hold    scapular/UT stretch with massage sitting x10 min x10 min  x 10 mins  x15 min   Cervical traction/ stretches supine x5 min x5 min X 5mins         Exercise Diary  2/14/19 2/19/19 2/21/19 2/13/19   UBE Standing FWD  100/90 x8 min Standing FWD  100/90 x9 min Standing FWD  100/90 [id number]  Standing FWD  100/90 x7 min   Shoulder rolls 2x15 x30 x30  x30   Scap retraction 2x15 x30 x30  x30   Chin tucks 2x15 x30 x30  x30   UT stretch 4x :20 4x :20 4x:20  4x :15   LS stretch 4x :20 4x :20 4x:20  4x :15   Ulnar nerve flossing x10 x10 x10  x10   Seated thoracic extension x15 x20 x20  x15   Doorway pec stretch 4x :20 4x :20 4x:20  4x :15   Bilateral shoulder ER x20 light blue x25 light blue x25 light blue  x15 light blue   MtP/LTP Light blue x 20 ea x30 light blue x30 light blue  Light blue x 15 ea   pec minor stretch supine x2 min x2 min x2 min  2 min    Towel cervical stretch (swag)  x10 ea  x10 ea     Stand I,T,Y  x10 I,Y  x2 T (pain) x10 I, Y     B/L sh IR/ER   Lt blue x10                                                                 Modalities 2/14/19 2/21/19 2/7/19 2/13/19   Premod with MH declined  --------- Post supine x10 min  declined                     The patient was given a new home exercise plan with written handout, pictures, and verbal instruction  The patient accepts and understands the new home activities

## 2019-02-25 ENCOUNTER — HOSPITAL ENCOUNTER (OUTPATIENT)
Dept: NEUROLOGY | Facility: CLINIC | Age: 49
Discharge: HOME | End: 2019-02-25
Payer: COMMERCIAL

## 2019-02-25 DIAGNOSIS — M54.2 CERVICALGIA: ICD-10-CM

## 2019-02-25 DIAGNOSIS — G56.20 ULNAR NEUROPATHY, UNSPECIFIED LATERALITY: ICD-10-CM

## 2019-02-25 DIAGNOSIS — M54.12 RADICULOPATHY, CERVICAL: ICD-10-CM

## 2019-02-25 DIAGNOSIS — M54.2 NECK PAIN: ICD-10-CM

## 2019-02-25 PROCEDURE — 95912 NRV CNDJ TEST 11-12 STUDIES: CPT | Performed by: PHYSICAL MEDICINE & REHABILITATION

## 2019-02-25 PROCEDURE — 95886 MUSC TEST DONE W/N TEST COMP: CPT | Performed by: PHYSICAL MEDICINE & REHABILITATION

## 2019-02-25 NOTE — PROCEDURES
[facility]   [facility] Landaverde [facility] , [address]   [phone]        Name: James Bojorquez  Patient ID: [id number]   Age: 54 Years 0 Months  YOB: 1970   Account Number:    Gender: Female   Technologist:    Date of Exam: 2/25/2019 09:43   Referring Physician: Yuni Tucker MD  Temperature: 32   Examining Physician: Clif Garland MD  Height:                 Patient History:   54 y o female with neck pain, right worse than left, with numbness radiating down the medial arm to digits 4-5  Ongoing for 4 months  Referred for cubital tunnel versus cervical radiculopathy evaluation  5/5 bilateral finger flexion/abduction, wrist extension, elbow flexion/extension, shoulder abduction         MNC      Nerve / Sites Muscle Latency Ref  Amplitude Ref  Duration Rel Amp Distance Lat Diff Velocity Ref  ms ms mV mV ms % mm ms m/s m/s   R Median - APB      Wrist APB 3 13 ?4 20 13 2 ?4 0 5 57 100 70         Elbow APB 7 60  10 6  5 42 79 8 225 4 48 50 ?50   L Median - APB      Wrist APB 3 18 ?4 20 11 2 ?4 0 6 77 100 70         Elbow APB 7 76  10 8  6 88 96 1 240 4 58 52 ?50   R Ulnar - ADM      Wrist ADM 2 81 ?3 30 11 9 ?5 0 6 82 100 70         B  Elbow ADM 6 46  12 1  6 93 102 210 3 65 58 ?50      A  Elbow ADM 8 07  12 4  6 72 102 90 1 61 56 ?49            5 26     L Ulnar - ADM      Wrist ADM 2 81 ?3 30 9 8 ?5 0 6 72 100 70         B  Elbow ADM 6 41  9 4  7 14 95 2 215 3 59 60 ?50      A  Elbow ADM 7 86  9 6  6 87 102 90 1 46 62 ?49            5 05     R Ulnar - FDI      Wrist FDI 3 44 ?4 50 7 7 ?7 0 6 82 100          B  Elbow FDI 7 24  7 0  6 51 90 9  3 80  ?50      A  Elbow FDI 8 80  7 2  6 41 103 90 1 56 58 ?50            5 36         SNC      Nerve / Sites Rec  Site Onset Lat Peak Lat Ref  Amp Ref  Distance Velocity Ref  ms ms ms µV µV mm m/s m/s   R Median - Digit II (Antidromic)      Wrist Dig II 2 34 3 23 ?3 50 51 1 ? 10 0 130 55 ?50   L Median - Digit II (Antidromic)      Wrist Dig II 2  60 3 49 ?3 50 60 2 ?10 0 130 50 ?50   R Ulnar - Digit V (Antidromic)      Wrist Dig V 2 19 3 07 ?3 10 48 1 ? 10 0 110 50 ?50   L Ulnar - Digit V (Antidromic)      Wrist Dig V 2 19 3 07 ?3 10 41 9 ?10 0 110 50 ?50   R Radial - Anatomical snuff box (Forearm)      Forearm Wrist 1 93 2 50 ?2 90 32 0 ?10 0 100 52 ?50   L Radial - Anatomical snuff box (Forearm)      Forearm Wrist 1 88 2 45 ?2 90 30 7 ?10 0 100 53 ?50       EMG              Needle EMG Examination     Insertional Spontaneous MUAP   Muscle Nerve Roots Activity Fib PSW Fasc Dur  Amp Poly Config Recruitment   R  Deltoid Axillary C5-C6 Normal None None None Normal Normal None Normal Normal   R  Biceps brachii Musculocutaneous C5-C6 Normal None None None Normal Normal None Normal Normal   R  Triceps brachii Radial C6-C8 Normal None None None Normal Normal None Normal Normal   R  Pronator teres Median C6-C7 Normal None None None Normal Normal None Normal Normal   R  First dorsal interosseous Ulnar C8-T1 Normal None None None Normal Normal None Normal Normal   R  Abductor pollicis brevis Median O4-W5 Normal None None None Normal Normal None Normal Normal   L  Deltoid Axillary C5-C6 Normal None None None Normal Normal None Normal Normal   L  Biceps brachii Musculocutaneous C5-C6 Normal None None None Normal Normal None Normal Normal   L  Triceps brachii Radial C6-C8 Normal None None None Normal Normal None Normal Normal   L  Pronator teres Median C6-C7 Normal None None None Normal Normal None Normal Normal   L  First dorsal interosseous Ulnar C8-T1 Normal None None None Normal Normal None Normal Normal   L  Abductor pollicis brevis Median V1-V9 Normal None None None Normal Normal None Normal Normal   R   Trapezius Accessory (spinal) C3-C4 Normal None None None Normal Normal None Normal Normal   R  Cervical paraspinals Spinal C4-C8 Normal None None None Normal Normal None Normal Normal         Findings:   Motor:  Left median compound motor action potential (CMAP) demonstrated normal distal latency, normal amplitude, and normal conduction velocity across the forearm  Left ulnar compound motor action potential (CMAP) demonstrated normal distal latency, normal amplitude, and normal conduction velocity across the elbow and forearm  Right median compound motor action potential (CMAP) demonstrated normal distal latency, normal amplitude, and normal conduction velocity across the forearm    Right ulnar compound motor action potential (CMAP) to ADM demonstrated normal distal latency, normal amplitude, and normal conduction velocity across the elbow and forearm  Right ulnar compound motor action potential (CMAP) to FDI demonstrated normal conduction velocity across the elbow  Sensory:  Left median sensory nerve action potential (SNAP) demonstrated normal amplitude and normal peak latency  Left ulnar sensory nerve action potential (SNAP) demonstrated normal amplitude and normal peak latency  Left radial sensory nerve action potential (SNAP) demonstrated normal amplitude and normal peak latency  Right median sensory nerve action potential (SNAP) demonstrated normal amplitude and normal peak latency  Right ulnar sensory nerve action potential (SNAP) demonstrated normal amplitude and normal peak latency  Right radial sensory nerve action potential (SNAP) demonstrated normal amplitude and normal peak latency  EMG:  A disposable monopolar needle was used to study selected muscles in the left and right upper extremity including the deltoid, biceps, triceps, pronator teres, first dorsal interosseous, and abductor pollicis brevis  The right trapezius and cervical paraspinals were tested  All muscles tested demonstrated normal insertional activity, no abnormal spontaneous activity, and normal volitional motor unit action potentials  Impression: Normal study  1   There is no electrodiagnostic evidence of a left or right median, ulnar, or radial mononeuropathy  2  There is no electrodiagnostic evidence of a left or right brachial plexopathy or cervical radiculopathy               ___________________________  Mir New MD

## 2019-02-26 ENCOUNTER — TELEPHONE (OUTPATIENT)
Dept: NEUROSURGERY | Facility: CLINIC | Age: 49
End: 2019-02-26

## 2019-02-26 ENCOUNTER — OFFICE VISIT (OUTPATIENT)
Dept: PHYSICAL THERAPY | Facility: CLINIC | Age: 49
End: 2019-02-26
Payer: COMMERCIAL

## 2019-02-26 DIAGNOSIS — G56.20 ULNAR NEUROPATHY, UNSPECIFIED LATERALITY: ICD-10-CM

## 2019-02-26 DIAGNOSIS — M54.12 RADICULOPATHY, CERVICAL: ICD-10-CM

## 2019-02-26 DIAGNOSIS — M54.2 CERVICALGIA: ICD-10-CM

## 2019-02-26 DIAGNOSIS — M54.2 NECK PAIN: Primary | ICD-10-CM

## 2019-02-26 PROCEDURE — 97110 THERAPEUTIC EXERCISES: CPT | Performed by: PHYSICAL THERAPIST

## 2019-02-26 PROCEDURE — 97140 MANUAL THERAPY 1/> REGIONS: CPT | Performed by: PHYSICAL THERAPIST

## 2019-02-26 NOTE — TELEPHONE ENCOUNTER
Notified patient of this  She was appreciative  She is aware to call with any questions or concerns

## 2019-02-26 NOTE — TELEPHONE ENCOUNTER
Regarding: Non-Urgent Medical Question  Contact: [phone]  ----- Message from [address] [place] [address] [address], Generic sent at 2/26/2019  8:08 AM EST -----    I have been unable to attend Crossfit since October when I started having my neck troubles  I have since been following your script of PT  However, I have had increased weight gain and just walking on my treadmill is simply not enough! I am feeling bloated and lousy and my self esteem is low  I want to know what kind of cardio I am allowed to do as I simply must get moving and reverse this process! I just had my re eval at PT last week and I'm doing much better! I also had my EMG done yesterday  Please advise  Thank you

## 2019-02-26 NOTE — PROGRESS NOTES
Daily Note     Today's date: 2019  Patient name: Francesca Cosme  : 1970  MRN: [id number]  Referring provider: Alex Alejandre MD  Dx:   Encounter Diagnosis     ICD-10-CM    1  Neck pain M54 2    2  Cervicalgia M54 2    3  Radiculopathy, cervical M54 12    4  Ulnar neuropathy, unspecified laterality G56 20                   Subjective: The patient notes increased soreness for 48 hours after her last session - the patient feels the massage was a little deeper which may have caused the soreness  The patient had an EMG done yesterday and preliminary results indicate negative carpal tunnel, cubital tunnel, or nerve impingement        Objective: See treatment diary below  Precautions: None noted  Re-evaluation: 3/14/19(prob d/c)    Shoulder Specialty Daily Treatment Diary     Manual  19    PA T/S mobs         Scapular mobs bilat        IASTM         scapular/UT stretch with massage sitting x10 min x10 min  x 10 mins hold    Cervical traction/ stretches supine x5 min x5 min X 5mins x8 mins        Exercise Diary  19    UBE Standing FWD  100/90 x8 min Standing FWD  100/90 x9 min Standing FWD  100/90 [id number] Standing Fwd  100/90  [id number]    Shoulder rolls 2x15 x30 x30 x30    Scap retraction 2x15 x30 x30 x30    Chin tucks 2x15 x30 x30 x30    UT stretch 4x :20 4x :20 4x:20 4x:20    LS stretch 4x :20 4x :20 4x:20 4x:20    Ulnar nerve flossing x10 x10 x10 x10    Seated thoracic extension x15 x20 x20 x20    Doorway pec stretch 4x :20 4x :20 4x:20 4x:20    Bilateral shoulder ER x20 light blue x25 light blue x25 light blue GRN  x15    MtP/LTP Light blue x 20 ea x30 light blue x30 light blue GRN  x15    pec minor stretch supine x2 min x2 min x2 min x2 min    Towel cervical stretch (swag)  x10 ea  x10 ea x10 ea    Stand I,T,Y  x10 I,Y  x2 T (pain) x10 I, Y x10 I,Y    B/L sh IR/ER   Lt blue x10 LT blue x15            Quadruped UE                      LE UE+LE    Quad holds no UE or LE  3x:30    Bridges    x10                                        Modalities 2/14/19 2/21/19     Premod with MH declined  ---------                         Assessment: The patient continues to have difficulty with over head activity - specifically R 4th and 5th digit paraesthesias when fatigued  The patient was asking about core exercises so she was instructed in bridges and quadruped positioning  The massage was held today to avoid DOMS > 24 hours after therapy  The patient will benefit from continued PT to achieve independence with HEP and achieve her functional goals  Plan: Will re-assess next visit for possible discharge to a home exercise plan

## 2019-02-26 NOTE — TELEPHONE ENCOUNTER
I would recommend any activity that is not axial loading, such as swimming / rowing machine / cycling, for aerobic conditioning   [facility]

## 2019-02-28 ENCOUNTER — APPOINTMENT (OUTPATIENT)
Dept: PHYSICAL THERAPY | Facility: CLINIC | Age: 49
End: 2019-02-28
Payer: COMMERCIAL

## 2019-03-05 ENCOUNTER — OFFICE VISIT (OUTPATIENT)
Dept: PHYSICAL THERAPY | Facility: CLINIC | Age: 49
End: 2019-03-05
Payer: COMMERCIAL

## 2019-03-05 DIAGNOSIS — M54.2 CERVICALGIA: ICD-10-CM

## 2019-03-05 DIAGNOSIS — M54.12 RADICULOPATHY, CERVICAL: ICD-10-CM

## 2019-03-05 DIAGNOSIS — G56.20 ULNAR NEUROPATHY, UNSPECIFIED LATERALITY: ICD-10-CM

## 2019-03-05 DIAGNOSIS — M54.2 NECK PAIN: Primary | ICD-10-CM

## 2019-03-05 PROCEDURE — G8983 BODY POS D/C STATUS: HCPCS | Performed by: PHYSICAL THERAPIST

## 2019-03-05 PROCEDURE — 97110 THERAPEUTIC EXERCISES: CPT | Performed by: PHYSICAL THERAPIST

## 2019-03-05 PROCEDURE — 97140 MANUAL THERAPY 1/> REGIONS: CPT | Performed by: PHYSICAL THERAPIST

## 2019-03-05 PROCEDURE — G8982 BODY POS GOAL STATUS: HCPCS | Performed by: PHYSICAL THERAPIST

## 2019-03-05 NOTE — PROGRESS NOTES
Daily Note/Discharge Note     Today's date: 3/5/2019  Patient name: Kalin Melendez  : 1970  MRN: [id number]  Referring provider: Juanito Miller MD  Dx:   Encounter Diagnosis     ICD-10-CM    1  Neck pain M54 2    2  Cervicalgia M54 2    3  Radiculopathy, cervical M54 12    4  Ulnar neuropathy, unspecified laterality G56 20                   Subjective: The patient reports she has been doing well  She reports her pain is a 1/10 at her neck and arms  The patient reports she is taking much less medication over the past 1-2 weeks  She no longer feels she needs the gabapentin/robaxin 3x/day, now she is using her medication 1x/day or none at all  The patient would like to review her HEP        Objective: See treatment diary below  Precautions: None noted  Re-evaluation: 3/14/19(prob d/c)    Shoulder Specialty Daily Treatment Diary     Manual   2/19/19 2/21/19 2/26/19 3/5/19   PA T/S mobs         Scapular mobs bilat        IASTM         scapular/UT stretch with massage sitting  x10 min  x 10 mins hold hold   Cervical traction/ stretches supine  x5 min X 5mins x8 mins x10 mins       Exercise Diary   2/19/19 2/21/19 2/26/19 3/5/19   UBE  Standing FWD  100/90 x9 min Standing FWD  100/90 [id number] Standing Fwd  100/90  [id number] Standing /90  [id number]   Shoulder rolls  x30 x30 x30 x30   Scap retraction  x30 x30 x30 x30   Chin tucks  x30 x30 x30 x30   UT stretch  4x :20 4x:20 4x:20 4x:20   LS stretch  4x :20 4x:20 4x:20 4x:20   Ulnar nerve flossing  x10 x10 x10 x10   Seated thoracic extension  x20 x20 x20 x20   Doorway pec stretch  4x :20 4x:20 4x:20 4x:20   Bilateral shoulder ER  x25 light blue x25 light blue GRN  x15 GRN   x15   MtP/LTP  x30 light blue x30 light blue GRN  x15 GRN x15   pec minor stretch supine  x2 min x2 min x2 min x2 mins   Towel cervical stretch (swag)  x10 ea  x10 ea x10 ea x10 ea   Stand I,T,Y  x10 I,Y  x2 T (pain) x10 I, Y x10 I,Y x10 I,Y   B/L sh IR/ER   Lt blue x10 LT blue x15 Lt blue x15 Quadruped UE                      LE               UE+LE    Quad holds no UE or LE  3x:30 HEP   Bridges    x10 HEP                                       FOTO SCORE: 75% function(predicted 62%) improved 27% since initial evaluation    Assessment: Leigh Ann Avendaño is a 54 y o  female that has attended 10 sessions of physical therapy  The patient has made functional gains since last re-assessment  The patient is using less pain medication and having less pain while having less functional limitations  The patient has attempted a rower and core exercises recommended by therapy at the gym over the weekend  The patient is now able to use the vacuum without as much pain but still does feel fatigued afterwards  The patient's headache frequency is now down to 1x/week  The patient is now independent with her home exercise plan and will be discharged from formal PT at this time  Goals  STG (4 weeks)  1  Patient will display good seated posture with decreased forward head and retracted shoulders for 2 consecutive sessions with 50% VC  MET  2  Patient will have 0/10 cervical pain at rest  MET  3  Improved right  strength to equal bilaterally for improved ADL function  MET  LTG (8 weeks)  1  Patient will have <2 HA in 2 weeks  MET  2  Patient will be able to care for disabled daughter with <4/10 pain for return to PLOF  MET  3  Patient will be independent with home exercise program for continued maintenance post PT DC  MET    Plan: Discharge outpatient PT to an independent HEP at this time

## 2019-03-05 NOTE — LETTER
2019    Dory Leventhal, MD  [facility] [address]    Patient: Mk Morris   YOB: 1970   Date of Visit: 3/5/2019     Encounter Diagnosis     ICD-10-CM    1  Neck pain M54 2    2  Cervicalgia M54 2    3  Radiculopathy, cervical M54 12    4  Ulnar neuropathy, unspecified laterality G57 20        Dear Dr Bishnu Fine:    Please review the attached Plan of Care from [facility] recent visit  Please verify that you agree therapy should be discharged to a HEP by signing the attached document and sending it back to our office  If you have any questions or concerns, please don't hesitate to call  Sincerely,    Kelsey Dunham PT      Referring Provider:      I certify that I have read the below Plan of Care and certify the need for these services furnished under this plan of treatment while under my care  Dory Leventhal, MD  [facility] [address] [facility]          Daily Note/Discharge Note     Today's date: 3/5/2019  Patient name: Mk Morris  : 1970  MRN: [id number]  Referring provider: Kary Mason MD  Dx:   Encounter Diagnosis     ICD-10-CM    1  Neck pain M54 2    2  Cervicalgia M54 2    3  Radiculopathy, cervical M54 12    4  Ulnar neuropathy, unspecified laterality G56 20                   Subjective: The patient reports she has been doing well  She reports her pain is a 1/10 at her neck and arms  The patient reports she is taking much less medication over the past 1-2 weeks  She no longer feels she needs the gabapentin/robaxin 3x/day, now she is using her medication 1x/day or none at all  The patient would like to review her HEP        Objective: See treatment diary below  Precautions: None noted  Re-evaluation: 3/14/19(prob d/c)    Shoulder Specialty Daily Treatment Diary     Manual   2/19/19 2/21/19 2/26/19 3/5/19   PA T/S mobs         Scapular mobs bilat        IASTM         scapular/UT stretch with massage sitting  x10 min  x 10 mins hold hold   Cervical traction/ stretches supine  x5 min X 5mins x8 mins x10 mins       Exercise Diary   2/19/19 2/21/19 2/26/19 3/5/19   UBE  Standing FWD  100/90 x9 min Standing FWD  100/90 [id number] Standing Fwd  100/90  [id number] Standing /90  [id number]   Shoulder rolls  x30 x30 x30 x30   Scap retraction  x30 x30 x30 x30   Chin tucks  x30 x30 x30 x30   UT stretch  4x :20 4x:20 4x:20 4x:20   LS stretch  4x :20 4x:20 4x:20 4x:20   Ulnar nerve flossing  x10 x10 x10 x10   Seated thoracic extension  x20 x20 x20 x20   Doorway pec stretch  4x :20 4x:20 4x:20 4x:20   Bilateral shoulder ER  x25 light blue x25 light blue GRN  x15 GRN   x15   MtP/LTP  x30 light blue x30 light blue GRN  x15 GRN x15   pec minor stretch supine  x2 min x2 min x2 min x2 mins   Towel cervical stretch (swag)  x10 ea  x10 ea x10 ea x10 ea   Stand I,T,Y  x10 I,Y  x2 T (pain) x10 I, Y x10 I,Y x10 I,Y   B/L sh IR/ER   Lt blue x10 LT blue x15 Lt blue x15           Quadruped UE                      LE               UE+LE    Quad holds no UE or LE  3x:30 HEP   Bridges    x10 HEP                                       FOTO SCORE: 75% function(predicted 62%) improved 27% since initial evaluation    Assessment: Viviane Cockayne is a 54 y o  female that has attended 10 sessions of physical therapy  The patient has made functional gains since last re-assessment  The patient is using less pain medication and having less pain while having less functional limitations  The patient has attempted a rower and core exercises recommended by therapy at the gym over the weekend  The patient is now able to use the vacuum without as much pain but still does feel fatigued afterwards  The patient's headache frequency is now down to 1x/week  The patient is now independent with her home exercise plan and will be discharged from formal PT at this time  Goals  STG (4 weeks)  1   Patient will display good seated posture with decreased forward head and retracted shoulders for 2 consecutive sessions with 50% VC  MET  2  Patient will have 0/10 cervical pain at rest  MET  3  Improved right  strength to equal bilaterally for improved ADL function  MET  LTG (8 weeks)  1  Patient will have <2 HA in 2 weeks  MET  2  Patient will be able to care for disabled daughter with <4/10 pain for return to PLOF  MET  3  Patient will be independent with home exercise program for continued maintenance post PT DC  MET    Plan: Discharge outpatient PT to an independent HEP at this time

## 2019-04-01 ENCOUNTER — OFFICE VISIT (OUTPATIENT)
Dept: PAIN MEDICINE | Facility: CLINIC | Age: 49
End: 2019-04-01
Payer: COMMERCIAL

## 2019-04-01 VITALS
BODY MASS INDEX: 40.61 KG/M2 | WEIGHT: 275 LBS | SYSTOLIC BLOOD PRESSURE: 124 MMHG | DIASTOLIC BLOOD PRESSURE: 76 MMHG | HEART RATE: 64 BPM | TEMPERATURE: 98.2 F

## 2019-04-01 DIAGNOSIS — M25.50 MULTIPLE JOINT PAIN: ICD-10-CM

## 2019-04-01 DIAGNOSIS — M50.120 CERVICAL DISC DISORDER WITH RADICULOPATHY OF MID-CERVICAL REGION: Primary | ICD-10-CM

## 2019-04-01 DIAGNOSIS — M54.12 RADICULOPATHY, CERVICAL: ICD-10-CM

## 2019-04-01 PROCEDURE — 99244 OFF/OP CNSLTJ NEW/EST MOD 40: CPT | Performed by: ANESTHESIOLOGY

## 2019-04-01 RX ORDER — DICLOFENAC SODIUM 75 MG/1
TABLET, DELAYED RELEASE ORAL
Qty: 60 TABLET | Refills: 0 | Status: SHIPPED | OUTPATIENT
Start: 2019-04-01 | End: 2019-04-15

## 2019-04-04 ENCOUNTER — HOSPITAL ENCOUNTER (OUTPATIENT)
Dept: RADIOLOGY | Facility: HOSPITAL | Age: 49
Discharge: HOME | End: 2019-04-04
Payer: COMMERCIAL

## 2019-04-04 ENCOUNTER — APPOINTMENT (OUTPATIENT)
Dept: LAB | Facility: HOSPITAL | Age: 49
End: 2019-04-04
Payer: COMMERCIAL

## 2019-04-04 DIAGNOSIS — M54.2 NECK PAIN: ICD-10-CM

## 2019-04-04 DIAGNOSIS — G56.20 ULNAR NEUROPATHY, UNSPECIFIED LATERALITY: ICD-10-CM

## 2019-04-04 DIAGNOSIS — M50.120 CERVICAL DISC DISORDER WITH RADICULOPATHY OF MID-CERVICAL REGION: ICD-10-CM

## 2019-04-04 DIAGNOSIS — M54.2 CERVICALGIA: ICD-10-CM

## 2019-04-04 DIAGNOSIS — M54.12 RADICULOPATHY, CERVICAL: ICD-10-CM

## 2019-04-04 LAB
CRP SERPL QL: <3 MG/L
ERYTHROCYTE [SEDIMENTATION RATE] IN BLOOD: 7 MM/HOUR (ref 0–20)

## 2019-04-04 PROCEDURE — 36415 COLL VENOUS BLD VENIPUNCTURE: CPT

## 2019-04-04 PROCEDURE — 85652 RBC SED RATE AUTOMATED: CPT

## 2019-04-04 PROCEDURE — 72052 X-RAY EXAM NECK SPINE 6/>VWS: CPT

## 2019-04-04 PROCEDURE — 86140 C-REACTIVE PROTEIN: CPT

## 2019-04-04 PROCEDURE — 86038 ANTINUCLEAR ANTIBODIES: CPT

## 2019-04-04 PROCEDURE — 86618 LYME DISEASE ANTIBODY: CPT

## 2019-04-04 PROCEDURE — 86430 RHEUMATOID FACTOR TEST QUAL: CPT

## 2019-04-05 LAB
B BURGDOR IGG SER IA-ACNC: 0.42
B BURGDOR IGM SER IA-ACNC: 0.57
RHEUMATOID FACT SER QL LA: NEGATIVE
RYE IGE QN: NEGATIVE

## 2019-04-08 ENCOUNTER — TELEPHONE (OUTPATIENT)
Dept: RADIOLOGY | Facility: CLINIC | Age: 49
End: 2019-04-08

## 2019-04-08 ENCOUNTER — TELEPHONE (OUTPATIENT)
Dept: PAIN MEDICINE | Facility: CLINIC | Age: 49
End: 2019-04-08

## 2019-04-10 ENCOUNTER — HOSPITAL ENCOUNTER (EMERGENCY)
Facility: HOSPITAL | Age: 49
Discharge: HOME | End: 2019-04-10
Attending: EMERGENCY MEDICINE | Admitting: EMERGENCY MEDICINE
Payer: COMMERCIAL

## 2019-04-10 VITALS
OXYGEN SATURATION: 99 % | WEIGHT: 275 LBS | SYSTOLIC BLOOD PRESSURE: 135 MMHG | HEIGHT: 70 IN | DIASTOLIC BLOOD PRESSURE: 71 MMHG | RESPIRATION RATE: 16 BRPM | TEMPERATURE: 97.6 F | HEART RATE: 68 BPM | BODY MASS INDEX: 39.37 KG/M2

## 2019-04-10 DIAGNOSIS — M54.12 CERVICAL RADICULOPATHY: Primary | ICD-10-CM

## 2019-04-10 PROCEDURE — 99283 EMERGENCY DEPT VISIT LOW MDM: CPT

## 2019-04-10 RX ORDER — CYCLOBENZAPRINE HCL 10 MG
10 TABLET ORAL 2 TIMES DAILY PRN
Qty: 20 TABLET | Refills: 0 | Status: SHIPPED | OUTPATIENT
Start: 2019-04-10 | End: 2019-04-10 | Stop reason: SDUPTHER

## 2019-04-10 RX ORDER — TRAMADOL HYDROCHLORIDE 50 MG/1
50 TABLET ORAL EVERY 6 HOURS PRN
Qty: 30 TABLET | Refills: 0 | Status: SHIPPED | OUTPATIENT
Start: 2019-04-10 | End: 2019-04-10 | Stop reason: SDUPTHER

## 2019-04-10 RX ORDER — CYCLOBENZAPRINE HCL 10 MG
10 TABLET ORAL 2 TIMES DAILY PRN
Qty: 20 TABLET | Refills: 0 | Status: SHIPPED | OUTPATIENT
Start: 2019-04-10 | End: 2019-05-01 | Stop reason: SDUPTHER

## 2019-04-10 RX ORDER — CYCLOBENZAPRINE HCL 10 MG
10 TABLET ORAL ONCE
Status: COMPLETED | OUTPATIENT
Start: 2019-04-10 | End: 2019-04-10

## 2019-04-10 RX ORDER — TRAMADOL HYDROCHLORIDE 50 MG/1
50 TABLET ORAL ONCE
Status: COMPLETED | OUTPATIENT
Start: 2019-04-10 | End: 2019-04-10

## 2019-04-10 RX ORDER — TRAMADOL HYDROCHLORIDE 50 MG/1
50 TABLET ORAL EVERY 6 HOURS PRN
Qty: 30 TABLET | Refills: 0 | Status: SHIPPED | OUTPATIENT
Start: 2019-04-10 | End: 2019-04-20

## 2019-04-10 RX ADMIN — CYCLOBENZAPRINE HYDROCHLORIDE 10 MG: 10 TABLET, FILM COATED ORAL at 13:02

## 2019-04-10 RX ADMIN — TRAMADOL HYDROCHLORIDE 50 MG: 50 TABLET, COATED ORAL at 13:02

## 2019-04-15 ENCOUNTER — OFFICE VISIT (OUTPATIENT)
Dept: NEUROSURGERY | Facility: CLINIC | Age: 49
End: 2019-04-15
Payer: COMMERCIAL

## 2019-04-15 ENCOUNTER — APPOINTMENT (OUTPATIENT)
Dept: LAB | Facility: CLINIC | Age: 49
End: 2019-04-15
Payer: COMMERCIAL

## 2019-04-15 ENCOUNTER — TRANSCRIBE ORDERS (OUTPATIENT)
Dept: LAB | Facility: CLINIC | Age: 49
End: 2019-04-15

## 2019-04-15 VITALS
DIASTOLIC BLOOD PRESSURE: 55 MMHG | HEIGHT: 70 IN | HEART RATE: 64 BPM | RESPIRATION RATE: 16 BRPM | WEIGHT: 283 LBS | TEMPERATURE: 98.1 F | SYSTOLIC BLOOD PRESSURE: 126 MMHG | BODY MASS INDEX: 40.52 KG/M2

## 2019-04-15 DIAGNOSIS — M48.02 CERVICAL SPINAL STENOSIS: ICD-10-CM

## 2019-04-15 DIAGNOSIS — Z01.818 PRE-PROCEDURAL EXAMINATION: ICD-10-CM

## 2019-04-15 DIAGNOSIS — M54.12 RADICULOPATHY, CERVICAL: ICD-10-CM

## 2019-04-15 DIAGNOSIS — M54.12 RADICULOPATHY, CERVICAL: Primary | ICD-10-CM

## 2019-04-15 LAB
ALBUMIN SERPL BCP-MCNC: 3.9 G/DL (ref 3.5–5)
ALP SERPL-CCNC: 56 U/L (ref 46–116)
ALT SERPL W P-5'-P-CCNC: 24 U/L (ref 12–78)
ANION GAP SERPL CALCULATED.3IONS-SCNC: 9 MMOL/L (ref 4–13)
APTT PPP: 29 SECONDS (ref 26–38)
AST SERPL W P-5'-P-CCNC: 12 U/L (ref 5–45)
BASOPHILS # BLD AUTO: 0.03 THOUSANDS/ΜL (ref 0–0.1)
BASOPHILS NFR BLD AUTO: 1 % (ref 0–1)
BILIRUB SERPL-MCNC: 0.2 MG/DL (ref 0.2–1)
BUN SERPL-MCNC: 20 MG/DL (ref 5–25)
CALCIUM SERPL-MCNC: 9.4 MG/DL (ref 8.3–10.1)
CHLORIDE SERPL-SCNC: 106 MMOL/L (ref 100–108)
CO2 SERPL-SCNC: 26 MMOL/L (ref 21–32)
CREAT SERPL-MCNC: 0.93 MG/DL (ref 0.6–1.3)
EOSINOPHIL # BLD AUTO: 0.29 THOUSAND/ΜL (ref 0–0.61)
EOSINOPHIL NFR BLD AUTO: 4 % (ref 0–6)
ERYTHROCYTE [DISTWIDTH] IN BLOOD BY AUTOMATED COUNT: 13 % (ref 11.6–15.1)
EST. AVERAGE GLUCOSE BLD GHB EST-MCNC: 111 MG/DL
GFR SERPL CREATININE-BSD FRML MDRD: 73 ML/MIN/1.73SQ M
GLUCOSE SERPL-MCNC: 79 MG/DL (ref 65–140)
HBA1C MFR BLD: 5.5 % (ref 4.2–6.3)
HCT VFR BLD AUTO: 40.7 % (ref 34.8–46.1)
HGB BLD-MCNC: 12.9 G/DL (ref 11.5–15.4)
IMM GRANULOCYTES # BLD AUTO: 0.02 THOUSAND/UL (ref 0–0.2)
IMM GRANULOCYTES NFR BLD AUTO: 0 % (ref 0–2)
INR PPP: 0.94 (ref 0.86–1.17)
LYMPHOCYTES # BLD AUTO: 1.64 THOUSANDS/ΜL (ref 0.6–4.47)
LYMPHOCYTES NFR BLD AUTO: 25 % (ref 14–44)
MCH RBC QN AUTO: 28.7 PG (ref 26.8–34.3)
MCHC RBC AUTO-ENTMCNC: 31.7 G/DL (ref 31.4–37.4)
MCV RBC AUTO: 90 FL (ref 82–98)
MONOCYTES # BLD AUTO: 0.34 THOUSAND/ΜL (ref 0.17–1.22)
MONOCYTES NFR BLD AUTO: 5 % (ref 4–12)
NEUTROPHILS # BLD AUTO: 4.32 THOUSANDS/ΜL (ref 1.85–7.62)
NEUTS SEG NFR BLD AUTO: 65 % (ref 43–75)
NRBC BLD AUTO-RTO: 0 /100 WBCS
PLATELET # BLD AUTO: 175 THOUSANDS/UL (ref 149–390)
PMV BLD AUTO: 10.2 FL (ref 8.9–12.7)
POTASSIUM SERPL-SCNC: 4.4 MMOL/L (ref 3.5–5.3)
PROT SERPL-MCNC: 7 G/DL (ref 6.4–8.2)
PROTHROMBIN TIME: 12.3 SECONDS (ref 11.8–14.2)
RBC # BLD AUTO: 4.5 MILLION/UL (ref 3.81–5.12)
SODIUM SERPL-SCNC: 141 MMOL/L (ref 136–145)
WBC # BLD AUTO: 6.64 THOUSAND/UL (ref 4.31–10.16)

## 2019-04-15 PROCEDURE — 99214 OFFICE O/P EST MOD 30 MIN: CPT | Performed by: NEUROLOGICAL SURGERY

## 2019-04-15 PROCEDURE — 36415 COLL VENOUS BLD VENIPUNCTURE: CPT

## 2019-04-15 PROCEDURE — 80053 COMPREHEN METABOLIC PANEL: CPT

## 2019-04-15 PROCEDURE — 85610 PROTHROMBIN TIME: CPT

## 2019-04-15 PROCEDURE — 83036 HEMOGLOBIN GLYCOSYLATED A1C: CPT

## 2019-04-15 PROCEDURE — 85025 COMPLETE CBC W/AUTO DIFF WBC: CPT

## 2019-04-15 PROCEDURE — 85730 THROMBOPLASTIN TIME PARTIAL: CPT

## 2019-04-15 PROCEDURE — 87081 CULTURE SCREEN ONLY: CPT

## 2019-04-15 RX ORDER — CHLORHEXIDINE GLUCONATE 0.12 MG/ML
15 RINSE ORAL ONCE
Status: CANCELLED | OUTPATIENT
Start: 2019-04-15 | End: 2019-04-15

## 2019-04-16 LAB — MRSA NOSE QL CULT: NORMAL

## 2019-04-26 ENCOUNTER — TELEPHONE (OUTPATIENT)
Dept: NEUROSURGERY | Facility: CLINIC | Age: 49
End: 2019-04-26

## 2019-04-26 RX ORDER — MELATONIN
2000 DAILY
COMMUNITY

## 2019-04-29 ENCOUNTER — ANESTHESIA EVENT (OUTPATIENT)
Dept: PERIOP | Facility: HOSPITAL | Age: 49
End: 2019-04-29
Payer: COMMERCIAL

## 2019-04-29 ENCOUNTER — DOCUMENTATION (OUTPATIENT)
Dept: NEUROSURGERY | Facility: CLINIC | Age: 49
End: 2019-04-29

## 2019-04-30 ENCOUNTER — HOSPITAL ENCOUNTER (OUTPATIENT)
Facility: HOSPITAL | Age: 49
Setting detail: OUTPATIENT SURGERY
Discharge: HOME | End: 2019-04-30
Attending: NEUROLOGICAL SURGERY | Admitting: NEUROLOGICAL SURGERY
Payer: COMMERCIAL

## 2019-04-30 ENCOUNTER — ANESTHESIA (OUTPATIENT)
Dept: PERIOP | Facility: HOSPITAL | Age: 49
End: 2019-04-30
Payer: COMMERCIAL

## 2019-04-30 ENCOUNTER — APPOINTMENT (OUTPATIENT)
Dept: RADIOLOGY | Facility: HOSPITAL | Age: 49
End: 2019-04-30
Payer: COMMERCIAL

## 2019-04-30 VITALS
DIASTOLIC BLOOD PRESSURE: 58 MMHG | WEIGHT: 276 LBS | BODY MASS INDEX: 39.51 KG/M2 | TEMPERATURE: 97.6 F | HEIGHT: 70 IN | HEART RATE: 60 BPM | SYSTOLIC BLOOD PRESSURE: 108 MMHG | RESPIRATION RATE: 17 BRPM | OXYGEN SATURATION: 97 %

## 2019-04-30 DIAGNOSIS — M48.02 CERVICAL SPINAL STENOSIS: Primary | ICD-10-CM

## 2019-04-30 DIAGNOSIS — M54.12 RADICULOPATHY, CERVICAL: ICD-10-CM

## 2019-04-30 LAB — GLUCOSE SERPL-MCNC: 82 MG/DL (ref 65–140)

## 2019-04-30 PROCEDURE — C1713 ANCHOR/SCREW BN/BN,TIS/BN: HCPCS | Performed by: NEUROLOGICAL SURGERY

## 2019-04-30 PROCEDURE — 22551 ARTHRD ANT NTRBDY CERVICAL: CPT | Performed by: NEUROLOGICAL SURGERY

## 2019-04-30 PROCEDURE — 20930 SP BONE ALGRFT MORSEL ADD-ON: CPT | Performed by: NEUROLOGICAL SURGERY

## 2019-04-30 PROCEDURE — 72040 X-RAY EXAM NECK SPINE 2-3 VW: CPT

## 2019-04-30 PROCEDURE — 82948 REAGENT STRIP/BLOOD GLUCOSE: CPT

## 2019-04-30 PROCEDURE — 22845 INSERT SPINE FIXATION DEVICE: CPT | Performed by: NEUROLOGICAL SURGERY

## 2019-04-30 PROCEDURE — NC001 PR NO CHARGE: Performed by: NEUROLOGICAL SURGERY

## 2019-04-30 PROCEDURE — 22853 INSJ BIOMECHANICAL DEVICE: CPT | Performed by: NEUROLOGICAL SURGERY

## 2019-04-30 DEVICE — SCREW 7713515 ZEVO VAR SD 3.5MM X 15MM
Type: IMPLANTABLE DEVICE | Site: SPINE CERVICAL | Status: FUNCTIONAL
Brand: ZEVO™ ANTERIOR CERVICAL PLATE SYSTEM

## 2019-04-30 DEVICE — IMPLANT 6240641 ANATOMIC 14X11X6MM
Type: IMPLANTABLE DEVICE | Site: SPINE CERVICAL | Status: FUNCTIONAL
Brand: VERTE-STACK® SPINAL SYSTEM

## 2019-04-30 DEVICE — BONE GRAFT SUBSTITUTE, FOAM PACK, BETA-TRICALCIUM PHOSPHATE AND TYPE I BOVINE COLLAGEN
Type: IMPLANTABLE DEVICE | Site: SPINE CERVICAL | Status: FUNCTIONAL
Brand: VITOSS

## 2019-04-30 RX ORDER — OXYCODONE HYDROCHLORIDE 5 MG/1
10 TABLET ORAL ONCE
Status: COMPLETED | OUTPATIENT
Start: 2019-04-30 | End: 2019-04-30

## 2019-04-30 RX ORDER — PROPOFOL 10 MG/ML
INJECTION, EMULSION INTRAVENOUS CONTINUOUS PRN
Status: DISCONTINUED | OUTPATIENT
Start: 2019-04-30 | End: 2019-04-30 | Stop reason: SURG

## 2019-04-30 RX ORDER — DIPHENHYDRAMINE HYDROCHLORIDE 50 MG/ML
12.5 INJECTION INTRAMUSCULAR; INTRAVENOUS ONCE AS NEEDED
Status: DISCONTINUED | OUTPATIENT
Start: 2019-04-30 | End: 2019-04-30 | Stop reason: HOSPADM

## 2019-04-30 RX ORDER — ONDANSETRON 2 MG/ML
4 INJECTION INTRAMUSCULAR; INTRAVENOUS EVERY 4 HOURS PRN
Status: DISCONTINUED | OUTPATIENT
Start: 2019-04-30 | End: 2019-04-30 | Stop reason: HOSPADM

## 2019-04-30 RX ORDER — NEOSTIGMINE METHYLSULFATE 1 MG/ML
INJECTION INTRAVENOUS AS NEEDED
Status: DISCONTINUED | OUTPATIENT
Start: 2019-04-30 | End: 2019-04-30 | Stop reason: SURG

## 2019-04-30 RX ORDER — ACETAMINOPHEN 325 MG/1
975 TABLET ORAL ONCE
Status: COMPLETED | OUTPATIENT
Start: 2019-04-30 | End: 2019-04-30

## 2019-04-30 RX ORDER — FENTANYL CITRATE/PF 50 MCG/ML
50 SYRINGE (ML) INJECTION
Status: DISCONTINUED | OUTPATIENT
Start: 2019-04-30 | End: 2019-04-30 | Stop reason: HOSPADM

## 2019-04-30 RX ORDER — HYDROMORPHONE HCL/PF 1 MG/ML
0.2 SYRINGE (ML) INJECTION
Status: DISCONTINUED | OUTPATIENT
Start: 2019-04-30 | End: 2019-04-30 | Stop reason: HOSPADM

## 2019-04-30 RX ORDER — HYDROMORPHONE HCL/PF 1 MG/ML
0.5 SYRINGE (ML) INJECTION
Status: DISCONTINUED | OUTPATIENT
Start: 2019-04-30 | End: 2019-04-30 | Stop reason: HOSPADM

## 2019-04-30 RX ORDER — GABAPENTIN 300 MG/1
300 CAPSULE ORAL ONCE
Status: COMPLETED | OUTPATIENT
Start: 2019-04-30 | End: 2019-04-30

## 2019-04-30 RX ORDER — METOCLOPRAMIDE HYDROCHLORIDE 5 MG/ML
10 INJECTION INTRAMUSCULAR; INTRAVENOUS ONCE AS NEEDED
Status: DISCONTINUED | OUTPATIENT
Start: 2019-04-30 | End: 2019-04-30 | Stop reason: HOSPADM

## 2019-04-30 RX ORDER — CLINDAMYCIN HYDROCHLORIDE 300 MG/1
300 CAPSULE ORAL 3 TIMES DAILY
Qty: 30 CAPSULE | Refills: 0 | Status: SHIPPED | OUTPATIENT
Start: 2019-04-30 | End: 2019-05-10

## 2019-04-30 RX ORDER — DEXAMETHASONE SODIUM PHOSPHATE 10 MG/ML
INJECTION, SOLUTION INTRAMUSCULAR; INTRAVENOUS AS NEEDED
Status: DISCONTINUED | OUTPATIENT
Start: 2019-04-30 | End: 2019-04-30 | Stop reason: SURG

## 2019-04-30 RX ORDER — ACETAMINOPHEN 325 MG/1
650 TABLET ORAL EVERY 6 HOURS PRN
Status: DISCONTINUED | OUTPATIENT
Start: 2019-04-30 | End: 2019-04-30 | Stop reason: HOSPADM

## 2019-04-30 RX ORDER — VANCOMYCIN HYDROCHLORIDE 1 G/20ML
INJECTION, POWDER, LYOPHILIZED, FOR SOLUTION INTRAVENOUS AS NEEDED
Status: DISCONTINUED | OUTPATIENT
Start: 2019-04-30 | End: 2019-04-30 | Stop reason: HOSPADM

## 2019-04-30 RX ORDER — MIDAZOLAM HYDROCHLORIDE 1 MG/ML
INJECTION INTRAMUSCULAR; INTRAVENOUS AS NEEDED
Status: DISCONTINUED | OUTPATIENT
Start: 2019-04-30 | End: 2019-04-30 | Stop reason: SURG

## 2019-04-30 RX ORDER — FENTANYL CITRATE 50 UG/ML
INJECTION, SOLUTION INTRAMUSCULAR; INTRAVENOUS AS NEEDED
Status: DISCONTINUED | OUTPATIENT
Start: 2019-04-30 | End: 2019-04-30 | Stop reason: SURG

## 2019-04-30 RX ORDER — CHLORHEXIDINE GLUCONATE 0.12 MG/ML
15 RINSE ORAL ONCE
Status: COMPLETED | OUTPATIENT
Start: 2019-04-30 | End: 2019-04-30

## 2019-04-30 RX ORDER — OXYCODONE HYDROCHLORIDE 5 MG/1
5 TABLET ORAL EVERY 4 HOURS PRN
Status: DISCONTINUED | OUTPATIENT
Start: 2019-04-30 | End: 2019-04-30 | Stop reason: HOSPADM

## 2019-04-30 RX ORDER — ONDANSETRON 2 MG/ML
INJECTION INTRAMUSCULAR; INTRAVENOUS AS NEEDED
Status: DISCONTINUED | OUTPATIENT
Start: 2019-04-30 | End: 2019-04-30 | Stop reason: SURG

## 2019-04-30 RX ORDER — SCOLOPAMINE TRANSDERMAL SYSTEM 1 MG/1
1 PATCH, EXTENDED RELEASE TRANSDERMAL
Status: DISCONTINUED | OUTPATIENT
Start: 2019-04-30 | End: 2019-04-30 | Stop reason: HOSPADM

## 2019-04-30 RX ORDER — OXYCODONE HYDROCHLORIDE 5 MG/1
10 TABLET ORAL EVERY 4 HOURS PRN
Status: DISCONTINUED | OUTPATIENT
Start: 2019-04-30 | End: 2019-04-30 | Stop reason: HOSPADM

## 2019-04-30 RX ORDER — KETAMINE HYDROCHLORIDE 50 MG/ML
INJECTION, SOLUTION, CONCENTRATE INTRAMUSCULAR; INTRAVENOUS AS NEEDED
Status: DISCONTINUED | OUTPATIENT
Start: 2019-04-30 | End: 2019-04-30 | Stop reason: SURG

## 2019-04-30 RX ORDER — MORPHINE SULFATE 4 MG/ML
2 INJECTION, SOLUTION INTRAMUSCULAR; INTRAVENOUS
Status: DISCONTINUED | OUTPATIENT
Start: 2019-04-30 | End: 2019-04-30 | Stop reason: HOSPADM

## 2019-04-30 RX ORDER — HYDROMORPHONE HCL/PF 1 MG/ML
SYRINGE (ML) INJECTION AS NEEDED
Status: DISCONTINUED | OUTPATIENT
Start: 2019-04-30 | End: 2019-04-30 | Stop reason: SURG

## 2019-04-30 RX ORDER — SODIUM CHLORIDE 9 MG/ML
INJECTION, SOLUTION INTRAVENOUS CONTINUOUS PRN
Status: DISCONTINUED | OUTPATIENT
Start: 2019-04-30 | End: 2019-04-30 | Stop reason: SURG

## 2019-04-30 RX ORDER — METHOCARBAMOL 500 MG/1
500 TABLET, FILM COATED ORAL EVERY 6 HOURS SCHEDULED
Status: DISCONTINUED | OUTPATIENT
Start: 2019-04-30 | End: 2019-04-30 | Stop reason: HOSPADM

## 2019-04-30 RX ORDER — PROPOFOL 10 MG/ML
INJECTION, EMULSION INTRAVENOUS AS NEEDED
Status: DISCONTINUED | OUTPATIENT
Start: 2019-04-30 | End: 2019-04-30 | Stop reason: SURG

## 2019-04-30 RX ORDER — GLYCOPYRROLATE 0.2 MG/ML
INJECTION INTRAMUSCULAR; INTRAVENOUS AS NEEDED
Status: DISCONTINUED | OUTPATIENT
Start: 2019-04-30 | End: 2019-04-30 | Stop reason: SURG

## 2019-04-30 RX ORDER — OXYCODONE HYDROCHLORIDE AND ACETAMINOPHEN 5; 325 MG/1; MG/1
1 TABLET ORAL EVERY 6 HOURS PRN
Qty: 60 TABLET | Refills: 0 | Status: SHIPPED | OUTPATIENT
Start: 2019-04-30 | End: 2019-05-14 | Stop reason: SDUPTHER

## 2019-04-30 RX ORDER — ROCURONIUM BROMIDE 10 MG/ML
INJECTION, SOLUTION INTRAVENOUS AS NEEDED
Status: DISCONTINUED | OUTPATIENT
Start: 2019-04-30 | End: 2019-04-30 | Stop reason: SURG

## 2019-04-30 RX ORDER — LIDOCAINE HYDROCHLORIDE AND EPINEPHRINE 10; 10 MG/ML; UG/ML
INJECTION, SOLUTION INFILTRATION; PERINEURAL AS NEEDED
Status: DISCONTINUED | OUTPATIENT
Start: 2019-04-30 | End: 2019-04-30 | Stop reason: HOSPADM

## 2019-04-30 RX ORDER — LIDOCAINE HYDROCHLORIDE 20 MG/ML
INJECTION, SOLUTION EPIDURAL; INFILTRATION; INTRACAUDAL; PERINEURAL AS NEEDED
Status: DISCONTINUED | OUTPATIENT
Start: 2019-04-30 | End: 2019-04-30 | Stop reason: SURG

## 2019-04-30 RX ORDER — ONDANSETRON 2 MG/ML
4 INJECTION INTRAMUSCULAR; INTRAVENOUS ONCE AS NEEDED
Status: DISCONTINUED | OUTPATIENT
Start: 2019-04-30 | End: 2019-04-30 | Stop reason: HOSPADM

## 2019-04-30 RX ADMIN — LIDOCAINE HYDROCHLORIDE 5 ML: 20 INJECTION, SOLUTION EPIDURAL; INFILTRATION; INTRACAUDAL; PERINEURAL at 07:42

## 2019-04-30 RX ADMIN — CHLORHEXIDINE GLUCONATE 0.12% ORAL RINSE 15 ML: 1.2 LIQUID ORAL at 07:07

## 2019-04-30 RX ADMIN — DEXAMETHASONE SODIUM PHOSPHATE 4 MG: 10 INJECTION, SOLUTION INTRAMUSCULAR; INTRAVENOUS at 07:50

## 2019-04-30 RX ADMIN — FENTANYL CITRATE 50 MCG: 50 INJECTION, SOLUTION INTRAMUSCULAR; INTRAVENOUS at 10:36

## 2019-04-30 RX ADMIN — PROPOFOL 200 MG: 10 INJECTION, EMULSION INTRAVENOUS at 07:42

## 2019-04-30 RX ADMIN — GABAPENTIN 300 MG: 300 CAPSULE ORAL at 07:07

## 2019-04-30 RX ADMIN — GLYCOPYRROLATE 0.4 MG: 0.2 INJECTION, SOLUTION INTRAMUSCULAR; INTRAVENOUS at 09:50

## 2019-04-30 RX ADMIN — FENTANYL CITRATE 50 MCG: 50 INJECTION, SOLUTION INTRAMUSCULAR; INTRAVENOUS at 10:31

## 2019-04-30 RX ADMIN — VANCOMYCIN HYDROCHLORIDE 1500 MG: 1 INJECTION, POWDER, LYOPHILIZED, FOR SOLUTION INTRAVENOUS at 07:14

## 2019-04-30 RX ADMIN — SODIUM CHLORIDE: 0.9 INJECTION, SOLUTION INTRAVENOUS at 07:40

## 2019-04-30 RX ADMIN — HYDROMORPHONE HYDROCHLORIDE 1 MG: 1 INJECTION, SOLUTION INTRAMUSCULAR; INTRAVENOUS; SUBCUTANEOUS at 07:48

## 2019-04-30 RX ADMIN — OXYCODONE HYDROCHLORIDE 10 MG: 5 TABLET ORAL at 07:13

## 2019-04-30 RX ADMIN — SCOPALAMINE 1 PATCH: 1 PATCH, EXTENDED RELEASE TRANSDERMAL at 07:06

## 2019-04-30 RX ADMIN — ROCURONIUM BROMIDE 40 MG: 10 INJECTION, SOLUTION INTRAVENOUS at 07:42

## 2019-04-30 RX ADMIN — KETAMINE HYDROCHLORIDE 50 MG: 50 INJECTION INTRAMUSCULAR; INTRAVENOUS at 08:12

## 2019-04-30 RX ADMIN — FENTANYL CITRATE 100 MCG: 50 INJECTION INTRAMUSCULAR; INTRAVENOUS at 07:42

## 2019-04-30 RX ADMIN — METHOCARBAMOL TABLETS 500 MG: 500 TABLET, COATED ORAL at 11:16

## 2019-04-30 RX ADMIN — SODIUM CHLORIDE: 0.9 INJECTION, SOLUTION INTRAVENOUS at 07:48

## 2019-04-30 RX ADMIN — OXYCODONE HYDROCHLORIDE 10 MG: 5 TABLET ORAL at 11:15

## 2019-04-30 RX ADMIN — ONDANSETRON 4 MG: 2 INJECTION INTRAMUSCULAR; INTRAVENOUS at 09:46

## 2019-04-30 RX ADMIN — MIDAZOLAM HYDROCHLORIDE 2 MG: 1 INJECTION, SOLUTION INTRAMUSCULAR; INTRAVENOUS at 07:38

## 2019-04-30 RX ADMIN — ACETAMINOPHEN 975 MG: 325 TABLET, FILM COATED ORAL at 07:07

## 2019-04-30 RX ADMIN — PROPOFOL 120 MCG/KG/MIN: 10 INJECTION, EMULSION INTRAVENOUS at 07:45

## 2019-04-30 RX ADMIN — NEOSTIGMINE METHYLSULFATE 3 MG: 1 INJECTION INTRAVENOUS at 09:50

## 2019-05-01 ENCOUNTER — TELEPHONE (OUTPATIENT)
Dept: NEUROSURGERY | Facility: CLINIC | Age: 49
End: 2019-05-01

## 2019-05-01 DIAGNOSIS — M54.12 CERVICAL RADICULOPATHY: ICD-10-CM

## 2019-05-01 RX ORDER — CYCLOBENZAPRINE HCL 10 MG
10 TABLET ORAL EVERY 8 HOURS PRN
Qty: 20 TABLET | Refills: 0 | Status: SHIPPED | OUTPATIENT
Start: 2019-05-01 | End: 2019-05-14 | Stop reason: ALTCHOICE

## 2019-05-06 ENCOUNTER — TELEPHONE (OUTPATIENT)
Dept: NEUROSURGERY | Facility: CLINIC | Age: 49
End: 2019-05-06

## 2019-05-08 ENCOUNTER — TELEPHONE (OUTPATIENT)
Dept: NEUROSURGERY | Facility: CLINIC | Age: 49
End: 2019-05-08

## 2019-05-08 DIAGNOSIS — Z98.890 STATUS POST SURGERY: Primary | ICD-10-CM

## 2019-05-08 DIAGNOSIS — R13.10 SWALLOWING PAIN: ICD-10-CM

## 2019-05-08 RX ORDER — METHYLPREDNISOLONE 4 MG/1
TABLET ORAL
Qty: 21 TABLET | Refills: 0 | Status: SHIPPED | OUTPATIENT
Start: 2019-05-08 | End: 2019-05-23 | Stop reason: ALTCHOICE

## 2019-05-10 ENCOUNTER — TRANSCRIBE ORDERS (OUTPATIENT)
Dept: NEUROSURGERY | Facility: CLINIC | Age: 49
End: 2019-05-10

## 2019-05-10 DIAGNOSIS — Z98.890 STATUS POST SURGERY: Primary | ICD-10-CM

## 2019-05-14 ENCOUNTER — CLINICAL SUPPORT (OUTPATIENT)
Dept: NEUROSURGERY | Facility: CLINIC | Age: 49
End: 2019-05-14

## 2019-05-14 VITALS
SYSTOLIC BLOOD PRESSURE: 118 MMHG | TEMPERATURE: 98.2 F | BODY MASS INDEX: 38.8 KG/M2 | DIASTOLIC BLOOD PRESSURE: 76 MMHG | HEIGHT: 70 IN | WEIGHT: 271 LBS | RESPIRATION RATE: 16 BRPM

## 2019-05-14 DIAGNOSIS — M54.12 RADICULOPATHY, CERVICAL: ICD-10-CM

## 2019-05-14 DIAGNOSIS — M48.02 CERVICAL SPINAL STENOSIS: ICD-10-CM

## 2019-05-14 DIAGNOSIS — Z98.890 STATUS POST SURGERY: Primary | ICD-10-CM

## 2019-05-14 PROCEDURE — 99024 POSTOP FOLLOW-UP VISIT: CPT

## 2019-05-14 RX ORDER — OXYCODONE HYDROCHLORIDE AND ACETAMINOPHEN 5; 325 MG/1; MG/1
1 TABLET ORAL EVERY 8 HOURS PRN
Qty: 10 TABLET | Refills: 0 | Status: SHIPPED | OUTPATIENT
Start: 2019-05-14 | End: 2019-06-03

## 2019-05-14 RX ORDER — METHOCARBAMOL 500 MG/1
500 TABLET, FILM COATED ORAL 3 TIMES DAILY PRN
Qty: 15 TABLET | Refills: 0 | Status: SHIPPED | OUTPATIENT
Start: 2019-05-14 | End: 2019-06-03

## 2019-05-21 ENCOUNTER — TELEPHONE (OUTPATIENT)
Dept: NEUROSURGERY | Facility: CLINIC | Age: 49
End: 2019-05-21

## 2019-05-23 ENCOUNTER — OFFICE VISIT (OUTPATIENT)
Dept: NEUROSURGERY | Facility: CLINIC | Age: 49
End: 2019-05-23

## 2019-05-23 VITALS
HEART RATE: 65 BPM | BODY MASS INDEX: 38.63 KG/M2 | RESPIRATION RATE: 18 BRPM | SYSTOLIC BLOOD PRESSURE: 108 MMHG | DIASTOLIC BLOOD PRESSURE: 71 MMHG | TEMPERATURE: 98.2 F | WEIGHT: 269.8 LBS | HEIGHT: 70 IN

## 2019-05-23 DIAGNOSIS — Z98.1 STATUS POST CERVICAL SPINAL FUSION: Primary | ICD-10-CM

## 2019-05-23 PROCEDURE — 99024 POSTOP FOLLOW-UP VISIT: CPT | Performed by: PHYSICIAN ASSISTANT

## 2019-06-03 ENCOUNTER — OFFICE VISIT (OUTPATIENT)
Dept: URGENT CARE | Age: 49
End: 2019-06-03
Payer: COMMERCIAL

## 2019-06-03 VITALS
TEMPERATURE: 97.8 F | DIASTOLIC BLOOD PRESSURE: 58 MMHG | SYSTOLIC BLOOD PRESSURE: 115 MMHG | BODY MASS INDEX: 38.22 KG/M2 | WEIGHT: 267 LBS | RESPIRATION RATE: 18 BRPM | HEIGHT: 70 IN | HEART RATE: 62 BPM | OXYGEN SATURATION: 100 %

## 2019-06-03 DIAGNOSIS — J06.9 VIRAL URI WITH COUGH: ICD-10-CM

## 2019-06-03 DIAGNOSIS — R09.81 NASAL CONGESTION: Primary | ICD-10-CM

## 2019-06-03 PROCEDURE — S9088 SERVICES PROVIDED IN URGENT: HCPCS | Performed by: NURSE PRACTITIONER

## 2019-06-03 PROCEDURE — 99203 OFFICE O/P NEW LOW 30 MIN: CPT | Performed by: NURSE PRACTITIONER

## 2019-06-03 RX ORDER — AMLODIPINE BESYLATE 5 MG/1
5 TABLET ORAL AS NEEDED
COMMUNITY
Start: 2018-04-06 | End: 2020-03-15

## 2019-06-03 RX ORDER — FLUTICASONE PROPIONATE 50 MCG
1 SPRAY, SUSPENSION (ML) NASAL DAILY
Qty: 1 BOTTLE | Refills: 0 | Status: SHIPPED | OUTPATIENT
Start: 2019-06-03 | End: 2020-07-30

## 2019-06-03 RX ORDER — TOPIRAMATE 50 MG/1
50 TABLET, FILM COATED ORAL 2 TIMES DAILY
COMMUNITY
Start: 2019-04-25 | End: 2021-09-10 | Stop reason: SDUPTHER

## 2019-06-03 RX ORDER — PREDNISONE 10 MG/1
TABLET ORAL
Qty: 21 TABLET | Refills: 0 | Status: SHIPPED | OUTPATIENT
Start: 2019-06-03 | End: 2019-06-12

## 2019-06-03 RX ORDER — TRAMADOL HYDROCHLORIDE 50 MG/1
TABLET ORAL
Refills: 0 | COMMUNITY
Start: 2019-04-24 | End: 2019-06-03

## 2019-06-05 ENCOUNTER — HOSPITAL ENCOUNTER (OUTPATIENT)
Dept: RADIOLOGY | Facility: HOSPITAL | Age: 49
Discharge: HOME | End: 2019-06-05
Payer: COMMERCIAL

## 2019-06-05 DIAGNOSIS — Z98.890 STATUS POST SURGERY: ICD-10-CM

## 2019-06-05 PROCEDURE — 72040 X-RAY EXAM NECK SPINE 2-3 VW: CPT

## 2019-06-12 ENCOUNTER — OFFICE VISIT (OUTPATIENT)
Dept: NEUROSURGERY | Facility: CLINIC | Age: 49
End: 2019-06-12

## 2019-06-12 VITALS
WEIGHT: 269 LBS | BODY MASS INDEX: 38.51 KG/M2 | DIASTOLIC BLOOD PRESSURE: 61 MMHG | HEART RATE: 61 BPM | SYSTOLIC BLOOD PRESSURE: 103 MMHG | HEIGHT: 70 IN | RESPIRATION RATE: 16 BRPM | TEMPERATURE: 98 F

## 2019-06-12 DIAGNOSIS — Z48.89 AFTERCARE FOLLOWING SURGERY FOR INJURY AND TRAUMA: Primary | ICD-10-CM

## 2019-06-12 PROCEDURE — 99024 POSTOP FOLLOW-UP VISIT: CPT | Performed by: NEUROLOGICAL SURGERY

## 2019-07-08 ENCOUNTER — APPOINTMENT (OUTPATIENT)
Dept: URGENT CARE | Facility: HOSPITAL | Age: 49
End: 2019-07-08

## 2019-07-17 ENCOUNTER — HOSPITAL ENCOUNTER (OUTPATIENT)
Dept: RADIOLOGY | Facility: HOSPITAL | Age: 49
Discharge: HOME | End: 2019-07-17
Payer: COMMERCIAL

## 2019-07-17 DIAGNOSIS — Z48.89 AFTERCARE FOLLOWING SURGERY FOR INJURY AND TRAUMA: ICD-10-CM

## 2019-07-17 PROCEDURE — 72040 X-RAY EXAM NECK SPINE 2-3 VW: CPT

## 2019-07-24 ENCOUNTER — OFFICE VISIT (OUTPATIENT)
Dept: NEUROSURGERY | Facility: CLINIC | Age: 49
End: 2019-07-24

## 2019-07-24 VITALS
HEART RATE: 65 BPM | RESPIRATION RATE: 16 BRPM | HEIGHT: 63 IN | BODY MASS INDEX: 47.66 KG/M2 | TEMPERATURE: 98.2 F | WEIGHT: 269 LBS | DIASTOLIC BLOOD PRESSURE: 59 MMHG | SYSTOLIC BLOOD PRESSURE: 125 MMHG

## 2019-07-24 DIAGNOSIS — M48.02 CERVICAL SPINAL STENOSIS: ICD-10-CM

## 2019-07-24 DIAGNOSIS — Z98.1 STATUS POST ARTHRODESIS: Primary | ICD-10-CM

## 2019-07-24 PROCEDURE — 99024 POSTOP FOLLOW-UP VISIT: CPT | Performed by: NEUROLOGICAL SURGERY

## 2019-07-24 RX ORDER — TOPIRAMATE 25 MG/1
25 TABLET ORAL
COMMUNITY
End: 2020-03-15

## 2019-07-24 NOTE — PROGRESS NOTES
Assessment/Plan:    No problem-specific Assessment & Plan notes found for this encounter  Problem List Items Addressed This Visit        Other    Cervical spinal stenosis      Other Visit Diagnoses     Status post arthrodesis    -  Primary    Relevant Orders    XR spine cervical 2 or 3 vw injury            Subjective:      Patient ID: Naina Pryor is a 54 y o  female  HPI    The following portions of the patient's history were reviewed and updated as appropriate:   She  has a past medical history of Bulging of cervical intervertebral disc, Disease of thyroid gland, Migraines, Neck pain, chronic, Polycystic ovarian syndrome, PONV (postoperative nausea and vomiting), and TMJ (temporomandibular joint syndrome)  She   Patient Active Problem List    Diagnosis Date Noted    Cervical spinal stenosis 04/15/2019    Radiculopathy, cervical 04/15/2019     She  has a past surgical history that includes Hysterectomy; Tubal ligation; Ankle surgery (Left); Tonsillectomy; Adenoidectomy;  section; Diagnostic laparoscopy; and pr arthrodesis ant interbody inc discectomy, cervical below c2 (Bilateral, 2019)  Her family history is not on file  She  reports that she has been smoking cigarettes  She has been smoking about 1 00 pack per day  She has never used smokeless tobacco  She reports that she does not drink alcohol or use drugs    Current Outpatient Medications   Medication Sig Dispense Refill    cholecalciferol (VITAMIN D3) 1,000 units tablet Take 2,000 Units by mouth daily      Cyanocobalamin (VITAMIN B 12 PO) Take by mouth      ferrous sulfate (IRON SUPPLEMENT) 325 (65 Fe) mg tablet Take 325 mg by mouth daily with breakfast      levothyroxine 100 mcg tablet Take 100 mcg by mouth daily      pramipexole (MIRAPEX) 0 5 mg tablet Take 0 5 mg by mouth 2 (two) times a day        topiramate (TOPAMAX) 25 mg tablet Take 25 mg by mouth 2 (two) times a day      amLODIPine (NORVASC) 5 mg tablet Take 5 mg by mouth daily      fluticasone (FLONASE) 50 mcg/act nasal spray 1 spray into each nostril daily 1 Bottle 0    topiramate (TOPAMAX) 50 MG tablet       urea (CARMOL) 40 % ointment Apply topically       No current facility-administered medications for this visit  Current Outpatient Medications on File Prior to Visit   Medication Sig    cholecalciferol (VITAMIN D3) 1,000 units tablet Take 2,000 Units by mouth daily    Cyanocobalamin (VITAMIN B 12 PO) Take by mouth    ferrous sulfate (IRON SUPPLEMENT) 325 (65 Fe) mg tablet Take 325 mg by mouth daily with breakfast    levothyroxine 100 mcg tablet Take 100 mcg by mouth daily    pramipexole (MIRAPEX) 0 5 mg tablet Take 0 5 mg by mouth 2 (two) times a day      topiramate (TOPAMAX) 25 mg tablet Take 25 mg by mouth 2 (two) times a day    amLODIPine (NORVASC) 5 mg tablet Take 5 mg by mouth daily    fluticasone (FLONASE) 50 mcg/act nasal spray 1 spray into each nostril daily    topiramate (TOPAMAX) 50 MG tablet     urea (CARMOL) 40 % ointment Apply topically     No current facility-administered medications on file prior to visit  She is allergic to amoxicillin; codeine; diclofenac; penicillins; sulfa antibiotics; diclofenac sodium; and etodolac       Review of Systems   Constitutional: Negative for chills, fatigue and fever  HENT:        Feels lump in throat when singing or bending neck  Lump has improved   Eyes: Negative  Respiratory: Negative for shortness of breath and wheezing  Cardiovascular: Negative for chest pain and palpitations  Gastrointestinal: Negative  Endocrine: Negative  Genitourinary: Negative  Musculoskeletal: Positive for neck pain (occasionally in back of neck)  Skin: Negative  Allergic/Immunologic: Negative  Neurological: Negative for dizziness, seizures, weakness, numbness and headaches  Hematological: Does not bruise/bleed easily     Psychiatric/Behavioral: Negative for sleep disturbance (improved sleeping)  Objective:      /59 (BP Location: Left arm, Patient Position: Sitting, Cuff Size: Standard)   Pulse 65   Temp 98 2 °F (36 8 °C) (Tympanic)   Resp 16   Ht 5' 3" (1 6 m)   Wt 122 kg (269 lb)   BMI 47 65 kg/m²           I have personally obtain history and examined patient  I have personally reviewed case including all pertinent investigations/studies  Time spent 15 minutes  More than 50% of total time spent on counseling and coordination of care as described above including patient education  HPI    3 months post op ACDF  Doing well with resolution of neck and arm pain  Back in the gym  No analgesic use  Exam    Well healed incision  Full strength bilateral UE and LE  Intact sensation and dtr  Normal fine motor and coordination  Normal cervical ROM                    Neck:   Supple, symmetrical, trachea midline, no adenopathy;        thyroid:  No enlargement/tenderness/nodules; no carotid    bruit or JVD   Back:     Symmetric, no curvature, ROM normal, no CVA tenderness                           Extremities:   Extremities normal, atraumatic, no cyanosis or edema   Pulses:   2+ and symmetric all extremities   Skin:   Skin color, texture, turgor normal, no rashes or lesions     Radiology    Xray    Anatomic alignment with all hardware and screws in expected position  No cage settling    Summary and Plan    Ms Barger is doing well 3 months post op  We reviewed activity restrictions as well as the need for ongoing home PT and ROM exercises  I will see her in 3 months with repeat xray

## 2019-11-06 ENCOUNTER — HOSPITAL ENCOUNTER (EMERGENCY)
Facility: HOSPITAL | Age: 49
Discharge: HOME | End: 2019-11-06
Attending: EMERGENCY MEDICINE | Admitting: EMERGENCY MEDICINE
Payer: COMMERCIAL

## 2019-11-06 ENCOUNTER — APPOINTMENT (EMERGENCY)
Dept: CT IMAGING | Facility: HOSPITAL | Age: 49
End: 2019-11-06
Payer: COMMERCIAL

## 2019-11-06 VITALS
DIASTOLIC BLOOD PRESSURE: 61 MMHG | TEMPERATURE: 97.5 F | BODY MASS INDEX: 46.94 KG/M2 | RESPIRATION RATE: 18 BRPM | WEIGHT: 265 LBS | HEART RATE: 60 BPM | SYSTOLIC BLOOD PRESSURE: 119 MMHG | OXYGEN SATURATION: 100 %

## 2019-11-06 DIAGNOSIS — R19.7 DIARRHEA: ICD-10-CM

## 2019-11-06 DIAGNOSIS — K62.5 RECTAL BLEEDING: Primary | ICD-10-CM

## 2019-11-06 LAB
ABO GROUP BLD: NORMAL
ALBUMIN SERPL BCP-MCNC: 4.4 G/DL (ref 3.5–5.7)
ALP SERPL-CCNC: 51 U/L (ref 40–150)
ALT SERPL W P-5'-P-CCNC: 11 U/L (ref 7–52)
ANION GAP SERPL CALCULATED.3IONS-SCNC: 8 MMOL/L (ref 4–13)
AST SERPL W P-5'-P-CCNC: 9 U/L (ref 13–39)
BASOPHILS # BLD AUTO: 0 THOUSANDS/ΜL (ref 0–0.1)
BASOPHILS NFR BLD AUTO: 1 % (ref 0–2)
BILIRUB SERPL-MCNC: 0.6 MG/DL (ref 0.2–1)
BLD GP AB SCN SERPL QL: NEGATIVE
BUN SERPL-MCNC: 11 MG/DL (ref 7–25)
CALCIUM SERPL-MCNC: 9.3 MG/DL (ref 8.6–10.5)
CHLORIDE SERPL-SCNC: 110 MMOL/L (ref 98–107)
CO2 SERPL-SCNC: 23 MMOL/L (ref 21–31)
CREAT SERPL-MCNC: 1 MG/DL (ref 0.6–1.2)
EOSINOPHIL # BLD AUTO: 0.2 THOUSAND/ΜL (ref 0–0.61)
EOSINOPHIL NFR BLD AUTO: 3 % (ref 0–5)
ERYTHROCYTE [DISTWIDTH] IN BLOOD BY AUTOMATED COUNT: 13.5 % (ref 11.5–14.5)
GFR SERPL CREATININE-BSD FRML MDRD: 67 ML/MIN/1.73SQ M
GLUCOSE SERPL-MCNC: 94 MG/DL (ref 65–99)
HCT VFR BLD AUTO: 43.1 % (ref 42–47)
HGB BLD-MCNC: 13.9 G/DL (ref 12–16)
LIPASE SERPL-CCNC: 19 U/L (ref 11–82)
LYMPHOCYTES # BLD AUTO: 1.8 THOUSANDS/ΜL (ref 0.6–4.47)
LYMPHOCYTES NFR BLD AUTO: 26 % (ref 21–51)
MCH RBC QN AUTO: 28.9 PG (ref 26–34)
MCHC RBC AUTO-ENTMCNC: 32.2 G/DL (ref 31–37)
MCV RBC AUTO: 90 FL (ref 81–99)
MONOCYTES # BLD AUTO: 0.4 THOUSAND/ΜL (ref 0.17–1.22)
MONOCYTES NFR BLD AUTO: 6 % (ref 2–12)
NEUTROPHILS # BLD AUTO: 4.6 THOUSANDS/ΜL (ref 1.4–6.5)
NEUTS SEG NFR BLD AUTO: 65 % (ref 42–75)
PLATELET # BLD AUTO: 245 THOUSANDS/UL (ref 149–390)
PMV BLD AUTO: 7.2 FL (ref 8.6–11.7)
POTASSIUM SERPL-SCNC: 3.7 MMOL/L (ref 3.5–5.5)
PROT SERPL-MCNC: 6.9 G/DL (ref 6.4–8.9)
RBC # BLD AUTO: 4.81 MILLION/UL (ref 3.9–5.2)
RH BLD: NEGATIVE
SODIUM SERPL-SCNC: 141 MMOL/L (ref 134–143)
SPECIMEN EXPIRATION DATE: NORMAL
WBC # BLD AUTO: 7.2 THOUSAND/UL (ref 4.8–10.8)

## 2019-11-06 PROCEDURE — 99285 EMERGENCY DEPT VISIT HI MDM: CPT

## 2019-11-06 PROCEDURE — 96360 HYDRATION IV INFUSION INIT: CPT

## 2019-11-06 PROCEDURE — 86901 BLOOD TYPING SEROLOGIC RH(D): CPT | Performed by: EMERGENCY MEDICINE

## 2019-11-06 PROCEDURE — 83690 ASSAY OF LIPASE: CPT | Performed by: EMERGENCY MEDICINE

## 2019-11-06 PROCEDURE — 80053 COMPREHEN METABOLIC PANEL: CPT | Performed by: EMERGENCY MEDICINE

## 2019-11-06 PROCEDURE — 82272 OCCULT BLD FECES 1-3 TESTS: CPT

## 2019-11-06 PROCEDURE — 36415 COLL VENOUS BLD VENIPUNCTURE: CPT | Performed by: EMERGENCY MEDICINE

## 2019-11-06 PROCEDURE — 86900 BLOOD TYPING SEROLOGIC ABO: CPT | Performed by: EMERGENCY MEDICINE

## 2019-11-06 PROCEDURE — 85025 COMPLETE CBC W/AUTO DIFF WBC: CPT | Performed by: EMERGENCY MEDICINE

## 2019-11-06 PROCEDURE — 99284 EMERGENCY DEPT VISIT MOD MDM: CPT | Performed by: EMERGENCY MEDICINE

## 2019-11-06 PROCEDURE — 96361 HYDRATE IV INFUSION ADD-ON: CPT

## 2019-11-06 PROCEDURE — 74178 CT ABD&PLV WO CNTR FLWD CNTR: CPT

## 2019-11-06 PROCEDURE — 86850 RBC ANTIBODY SCREEN: CPT | Performed by: EMERGENCY MEDICINE

## 2019-11-06 RX ADMIN — IOHEXOL 100 ML: 350 INJECTION, SOLUTION INTRAVENOUS at 05:48

## 2019-11-06 RX ADMIN — SODIUM CHLORIDE 1000 ML: 0.9 INJECTION, SOLUTION INTRAVENOUS at 04:47

## 2019-11-06 NOTE — ED TRIAGE NOTES
Patient arrives with complaints of GI bleeding intermittently since Sunday  States blood is both dark and bright red  Did have similar episode a year ago where had colonoscopy and diagnosed with internal hemmhroids not requiring surgical intervention

## 2019-11-06 NOTE — DISCHARGE INSTRUCTIONS
RETURN IF WORSE IN ANY WAY: CHEST PAIN, SHORTNESS OF BREATH, INCREASED PAIN, FEVER OR FLU LIKE SYMPTOMS, OR NEW AND CONCERNING SYMPTOMS SIGNS OR SYMPTOMS:    PLEASE CALL YOUR PRIMARY DOCTOR AND GI SPECIALIST THIS MORNING TO SET UP FOLLOW UP FOR LATER TODAY  PLEASE REVIEW THE WORK UP RESULTS WITH YOUR DOCTOR  PLEASE TAKE STOOL SAMPLE TO YOUR GI DOCTOR OR FAMILY DOCTOR

## 2019-11-06 NOTE — ED PROVIDER NOTES
History  Chief Complaint   Patient presents with    GI Bleeding     46-YEAR-OLD FEMALE, HISTORY OF INTERNAL AND EXTERNAL HEMORRHOIDS, CONFIRMED ON COLONOSCOPY ABOUT A YEAR AGO     PATIENT IS HERE FOR RECTAL BLEED ASSOCIATED WITH DIARRHEA AND DIFFUSE LOWER ABDOMINAL PAIN    SYMPTOMS STARTED ON SUNDAY AFTER SHE HAD A ICE CREAM   SHE WAS ON VACATION DECIDED TO TREAT HERSELF  SHE SAID INITIALLY THERE WAS LARGE CLOTS MIXED WITH BLOOD IN THE TOILET  SYMPTOMS HAVE BEEN INTERMITTENT, BUT HAVE WORSENED THIS MORNING, WITH ANOTHER BRIGHT RED BLOODY BOWEL MOVEMENT SHE DECIDED TO COME IN FOR EVALUATION    SHE HAS NO NAUSEA OR VOMITING  NO FEVERS OR CHILLS    HER ABDOMINAL PAIN IS LOCATED LOWER ABDOMEN, IT IS RATED AS 5/10  IT COMES AND GOES    INTERVENTIONS:  NONE    BLOOD THINNERS:    PATIENT IS NOT ON ANY BLOOD THINNERS    HISTORY OF GI BLEED:  NONE    HISTORY OF LIVER DISEASE:  NONE     ASSOCIATED SYMPTOMS:  PATIENT HAS NO CHEST PAIN OR SHORTNESS OF BREATH  NO DIZZINESS  NO SYNCOPE OR NEAR SYNCOPE     URINARY  SYMPTOMS: THERE IS NO DYSURIA, NO HEMATURIA, NO FREQUENCY     ALLEVIATING OR EXACERBATING FACTORS:    PAIN IS WORSE WITH BOWEL MOVEMENT        History provided by:  Patient  Rectal Bleeding - Minor   Quality:  Bright red  Amount:   Moderate  Chronicity:  New  Context: hemorrhoids    Context: not anal fissures, not anal penetration, not constipation, not defecation, not diarrhea, not foreign body, not rectal injury, not rectal pain and not spontaneously    Similar prior episodes: yes    Relieved by:  Nothing  Worsened by:  Nothing  Ineffective treatments:  None tried  Associated symptoms: abdominal pain    Associated symptoms: no dizziness, no epistaxis, no fever, no hematemesis, no light-headedness, no loss of consciousness, no recent illness and no vomiting    Risk factors: no anticoagulant use, no hx of colorectal cancer, no hx of colorectal surgery, no hx of IBD, no liver disease, no NSAID use and no steroid use        Prior to Admission Medications   Prescriptions Last Dose Informant Patient Reported? Taking? Cyanocobalamin (VITAMIN B 12 PO)   Yes No   Sig: Take by mouth   amLODIPine (NORVASC) 5 mg tablet   Yes No   Sig: Take 5 mg by mouth as needed    cholecalciferol (VITAMIN D3) 1,000 units tablet   Yes No   Sig: Take 2,000 Units by mouth daily   ferrous sulfate (IRON SUPPLEMENT) 325 (65 Fe) mg tablet   Yes No   Sig: Take 325 mg by mouth daily with breakfast   fluticasone (FLONASE) 50 mcg/act nasal spray   No No   Si spray into each nostril daily   levothyroxine 100 mcg tablet   Yes No   Sig: Take 100 mcg by mouth daily   pramipexole (MIRAPEX) 0 5 mg tablet   Yes No   Sig: Take 0 5 mg by mouth 2 (two) times a day     topiramate (TOPAMAX) 25 mg tablet   Yes No   Sig: Take 25 mg by mouth daily at bedtime    topiramate (TOPAMAX) 50 MG tablet   Yes No   urea (CARMOL) 40 % ointment   Yes No   Sig: Apply topically      Facility-Administered Medications: None       Past Medical History:   Diagnosis Date    Bulging of cervical intervertebral disc     Disease of thyroid gland     Migraines     Neck pain, chronic     Polycystic ovarian syndrome     PONV (postoperative nausea and vomiting)     TMJ (temporomandibular joint syndrome)        Past Surgical History:   Procedure Laterality Date    ADENOIDECTOMY      ANKLE SURGERY Left     reconstruction     SECTION      x2    DIAGNOSTIC LAPAROSCOPY      HYSTERECTOMY      LA ARTHRODESIS ANT INTERBODY INC DISCECTOMY, CERVICAL BELOW C2 Bilateral 2019    Procedure: C5/6 ANTERIOR CERVICAL DECOMPRESSION AND FUSION;  Surgeon: Dione Tripp MD;  [facility];  Service: Neurosurgery    TONSILLECTOMY      TUBAL LIGATION         History reviewed  No pertinent family history  I have reviewed and agree with the history as documented      Social History     Tobacco Use    Smoking status: Current Every Day Smoker     Packs/day: 1 00     Types: Cigarettes    Smokeless tobacco: Never Used   Substance Use Topics    Alcohol use: No    Drug use: Never        Review of Systems   Constitutional: Negative for chills, diaphoresis, fatigue and fever  HENT: Negative for nosebleeds  Respiratory: Negative for cough, shortness of breath, wheezing and stridor  Cardiovascular: Negative for chest pain, palpitations and leg swelling  Gastrointestinal: Positive for abdominal pain, anal bleeding, blood in stool and hematochezia  Negative for abdominal distention, constipation, diarrhea, hematemesis, nausea, rectal pain and vomiting  Genitourinary: Negative for difficulty urinating, dysuria, flank pain and frequency  Musculoskeletal: Negative for arthralgias, back pain, gait problem, joint swelling, myalgias, neck pain and neck stiffness  Skin: Negative for rash and wound  Neurological: Negative for dizziness, loss of consciousness, light-headedness and headaches  All other systems reviewed and are negative  Physical Exam  Physical Exam   Constitutional: She is oriented to person, place, and time  She appears well-developed and well-nourished  No distress  HENT:   Head: Normocephalic and atraumatic  Nose: Nose normal    Mouth/Throat: Oropharynx is clear and moist  No oropharyngeal exudate  Eyes: Pupils are equal, round, and reactive to light  Conjunctivae and EOM are normal  Right eye exhibits no discharge  Left eye exhibits no discharge  No scleral icterus  Neck: Normal range of motion  Neck supple  No JVD present  No tracheal deviation present  Cardiovascular: Normal rate, regular rhythm, normal heart sounds and intact distal pulses  Exam reveals no gallop and no friction rub  No murmur heard  Pulmonary/Chest: Effort normal and breath sounds normal  No stridor  No respiratory distress  She has no wheezes  She has no rales  She exhibits no tenderness  Abdominal: Soft  Bowel sounds are normal  She exhibits no distension and no mass   There is no tenderness  There is no rebound and no guarding  No hernia  Genitourinary:   Genitourinary Comments: WITH NURSE AS CHAPERONE RECTAL EXAM WAS PERFORMED  THERE ARE SEVERAL LARGE HEMORRHOIDS NONE OF WHICH APPEAR TO BE THE SOURCE OF BLEEDING AT ALL  RECTAL TONE IS NORMAL, AS IS SENSATION   Musculoskeletal: Normal range of motion  She exhibits no edema, tenderness or deformity  Lymphadenopathy:     She has no cervical adenopathy  Neurological: She is alert and oriented to person, place, and time  No cranial nerve deficit or sensory deficit  She exhibits normal muscle tone  Coordination normal    Skin: Skin is warm  Capillary refill takes less than 2 seconds  No rash noted  She is not diaphoretic  No erythema  No pallor  Psychiatric: She has a normal mood and affect   Her behavior is normal  Judgment and thought content normal        Vital Signs  ED Triage Vitals [11/06/19 0417]   Temperature Pulse Respirations Blood Pressure SpO2   97 6 °F (36 4 °C) 79 18 129/60 100 %      Temp Source Heart Rate Source Patient Position - Orthostatic VS BP Location FiO2 (%)   Temporal Monitor -- Left arm --      Pain Score       5           Vitals:    11/06/19 0417 11/06/19 0624   BP: 129/60 119/61   Pulse: 79 60         Visual Acuity      ED Medications  Medications   sodium chloride 0 9 % bolus 1,000 mL (0 mL Intravenous Stopped 11/6/19 0620)   iohexol (OMNIPAQUE) 350 MG/ML injection (SINGLE-DOSE) 100 mL (100 mL Intravenous Given 11/6/19 0548)       Diagnostic Studies  Results Reviewed     Procedure Component Value Units Date/Time    CMP [[id number]]  (Abnormal) Collected:  11/06/19 0443    Lab Status:  Final result Specimen:  Blood from Arm, Right Updated:  11/06/19 0510     Sodium 141 mmol/L      Potassium 3 7 mmol/L      Chloride 110 mmol/L      CO2 23 mmol/L      ANION GAP 8 mmol/L      BUN 11 mg/dL      Creatinine 1 00 mg/dL      Glucose 94 mg/dL      Calcium 9 3 mg/dL      AST 9 U/L      ALT 11 U/L      Alkaline Phosphatase 51 U/L      Total Protein 6 9 g/dL      Albumin 4 4 g/dL      Total Bilirubin 0 60 mg/dL      eGFR 67 ml/min/1 73sq m     Narrative:       [place] guidelines for Chronic Kidney Disease (CKD):     Stage 1 with normal or high GFR (GFR > 90 mL/min/1 73 square meters)    Stage 2 Mild CKD (GFR = 60-89 mL/min/1 73 square meters)    Stage 3A Moderate CKD (GFR = 45-59 mL/min/1 73 square meters)    Stage 3B Moderate CKD (GFR = 30-44 mL/min/1 73 square meters)    Stage 4 Severe CKD (GFR = 15-29 mL/min/1 73 square meters)    Stage 5 End Stage CKD (GFR <15 mL/min/1 73 square meters)  Note: GFR calculation is accurate only with a steady state creatinine    Lipase [[id number]]  (Normal) Collected:  11/06/19 0443    Lab Status:  Final result Specimen:  Blood from Arm, Right Updated:  11/06/19 0510     Lipase 19 u/L     CBC and differential [[id number]]  (Abnormal) Collected:  11/06/19 0443    Lab Status:  Final result Specimen:  Blood from Arm, Right Updated:  11/06/19 0454     WBC 7 20 Thousand/uL      RBC 4 81 Million/uL      Hemoglobin 13 9 g/dL      Hematocrit 43 1 %      MCV 90 fL      MCH 28 9 pg      MCHC 32 2 g/dL      RDW 13 5 %      MPV 7 2 fL      Platelets 127 Thousands/uL      Neutrophils Relative 65 %      Lymphocytes Relative 26 %      Monocytes Relative 6 %      Eosinophils Relative 3 %      Basophils Relative 1 %      Neutrophils Absolute 4 60 Thousands/µL      Lymphocytes Absolute 1 80 Thousands/µL      Monocytes Absolute 0 40 Thousand/µL      Eosinophils Absolute 0 20 Thousand/µL      Basophils Absolute 0 00 Thousands/µL     Stool Enteric Bacterial Panel by PCR [[id number]]     Lab Status:  No result Specimen:  Stool                  CT high volume lower GI bleed   Final Result by Nadir Connelly DO (11/06 0603)   1  No CT evidence of active high volume gastrointestinal hemorrhage  2   Hepatomegaly with fatty infiltration  3   Colonic diverticulosis           Workstation performed: [id number]                    Procedures  Procedures       ED Course  ED Course as of Nov 06 0624   Wed Nov 06, 2019   8118 Hemoccult testing at bedside it does not appear positive at all  While patient does have large hemorrhoids externally, none of them appear to be the source of the type of bleeding at the patient is describing      0 D/w Radiology Tech  Recommended specific GI bleed CT  order corrected      0458 WBC: 7 20   0458 Hemoglobin: 13 9   0458 Platelet Count: 008   0458 Neutrophils %: 65   0458 Lymphocytes Relative: 26   0458 Monocytes Relative: 6   0458 Eosinophils: 3   0528 Labs very reassuring      0528 Lipase: 19   0528 Sodium: 141   0528 Potassium: 3 7   0528 Chloride(!): 110   0528 CO2: 23   0528 Anion Gap: 8   0528 BUN: 11   0528 Creatinine: 1 00   0528 AST(!): 9   0528 ALT: 11   0528 Alkaline Phosphatase: 51   0600 Pt remains stable      0613 Ct scan reviewed:     1   No CT evidence of active high volume gastrointestinal hemorrhage  2   Hepatomegaly with fatty infiltration  3   Colonic diverticulosis        8442 I reviewed with the patient her results of her workup  she was unable to give a stool sample, but she wants tibial take a specimen cup home to provide a sample from home    We talked about indications for admission for GI bleed, none of which applied to her:  Symptomatic hypokalemia, low hemoglobin, abnormal vital signs  Patient understands, she is very agreeable - she wants to go home now    She will follow up with her family doctor or GI later today    We will defer any further treatment with antibiotics so the patient be seen by her family doctor and/or GI and hopefully a stool sample could be collected further assess whether antibiotics are necessary at this point  Patient is very happy with this plan                                  MDM    Disposition  Final diagnoses:   Rectal bleeding   Diarrhea     Time reflects when diagnosis was documented in both MDM as applicable and the Disposition within this note     Time User Action Codes Description Comment    11/6/2019  6:20 AM Sol Silver Add [K62 5] Rectal bleeding     11/6/2019  6:20 AM Sol Silver Add [R19 7] Diarrhea       ED Disposition     ED Disposition Condition Date/Time Comment    Discharge Stable Wed Nov 6, 2019  6:20 AM Tabby Shelton discharge to home/self care  Follow-up Information     Follow up With Specialties Details Why Ej Argueta MD [facility] Medicine Call today  [address] [facility]  [address] [place] [address] [facility]  Call today      Cecil Stephen MD Gastroenterology Call today  [address]  [facility]  [address] [place], [place] [phone]            Patient's Medications   Discharge Prescriptions    No medications on file     No discharge procedures on file      ED Provider  Electronically Signed by           Ashely Sanchez MD  11/06/19 7009

## 2019-12-05 ENCOUNTER — TRANSCRIBE ORDERS (OUTPATIENT)
Dept: LAB | Facility: HOSPITAL | Age: 49
End: 2019-12-05

## 2019-12-05 ENCOUNTER — APPOINTMENT (OUTPATIENT)
Dept: LAB | Facility: HOSPITAL | Age: 49
End: 2019-12-05
Payer: COMMERCIAL

## 2019-12-05 DIAGNOSIS — R19.7 DIARRHEA, UNSPECIFIED TYPE: Primary | ICD-10-CM

## 2019-12-05 DIAGNOSIS — R10.84 GENERALIZED ABDOMINAL PAIN: ICD-10-CM

## 2019-12-05 DIAGNOSIS — R19.7 DIARRHEA, UNSPECIFIED TYPE: ICD-10-CM

## 2019-12-05 DIAGNOSIS — K62.5 HEMORRHAGE OF ANUS AND RECTUM: ICD-10-CM

## 2019-12-05 LAB — CRP SERPL QL: <5 MG/L

## 2019-12-05 PROCEDURE — 83516 IMMUNOASSAY NONANTIBODY: CPT

## 2019-12-05 PROCEDURE — 86140 C-REACTIVE PROTEIN: CPT

## 2019-12-05 PROCEDURE — 36415 COLL VENOUS BLD VENIPUNCTURE: CPT

## 2019-12-05 PROCEDURE — 82784 ASSAY IGA/IGD/IGG/IGM EACH: CPT

## 2019-12-05 PROCEDURE — 86255 FLUORESCENT ANTIBODY SCREEN: CPT

## 2019-12-07 LAB
ENDOMYSIUM IGA SER QL: NEGATIVE
GLIADIN PEPTIDE IGA SER-ACNC: 2 UNITS (ref 0–19)
GLIADIN PEPTIDE IGA SER-ACNC: 3 UNITS (ref 0–19)
GLIADIN PEPTIDE IGG SER-ACNC: 3 UNITS (ref 0–19)
GLIADIN PEPTIDE IGG SER-ACNC: 3 UNITS (ref 0–19)
IGA SERPL-MCNC: 75 MG/DL (ref 87–352)
TTG IGA SER-ACNC: <2 U/ML (ref 0–3)
TTG IGG SER-ACNC: <2 U/ML (ref 0–5)

## 2019-12-10 ENCOUNTER — HOSPITAL ENCOUNTER (EMERGENCY)
Facility: HOSPITAL | Age: 49
Discharge: HOME | End: 2019-12-10
Attending: EMERGENCY MEDICINE
Payer: COMMERCIAL

## 2019-12-10 VITALS
HEIGHT: 69 IN | OXYGEN SATURATION: 100 % | SYSTOLIC BLOOD PRESSURE: 128 MMHG | BODY MASS INDEX: 39.99 KG/M2 | HEART RATE: 69 BPM | RESPIRATION RATE: 18 BRPM | TEMPERATURE: 97.5 F | WEIGHT: 270 LBS | DIASTOLIC BLOOD PRESSURE: 62 MMHG

## 2019-12-10 DIAGNOSIS — S61.012A LACERATION OF LEFT THUMB WITHOUT FOREIGN BODY WITHOUT DAMAGE TO NAIL, INITIAL ENCOUNTER: Primary | ICD-10-CM

## 2019-12-10 PROCEDURE — 12001 RPR S/N/AX/GEN/TRNK 2.5CM/<: CPT | Performed by: EMERGENCY MEDICINE

## 2019-12-10 PROCEDURE — 99282 EMERGENCY DEPT VISIT SF MDM: CPT

## 2019-12-10 PROCEDURE — 99282 EMERGENCY DEPT VISIT SF MDM: CPT | Performed by: EMERGENCY MEDICINE

## 2019-12-11 ENCOUNTER — APPOINTMENT (OUTPATIENT)
Dept: LAB | Facility: HOSPITAL | Age: 49
End: 2019-12-11
Payer: COMMERCIAL

## 2019-12-11 ENCOUNTER — TRANSCRIBE ORDERS (OUTPATIENT)
Dept: LAB | Facility: HOSPITAL | Age: 49
End: 2019-12-11

## 2019-12-11 DIAGNOSIS — K52.9 NONINFECTIOUS GASTROENTERITIS, UNSPECIFIED TYPE: ICD-10-CM

## 2019-12-11 DIAGNOSIS — R10.84 GENERALIZED ABDOMINAL PAIN: ICD-10-CM

## 2019-12-11 DIAGNOSIS — K62.5 HEMORRHAGE OF ANUS AND RECTUM: ICD-10-CM

## 2019-12-11 DIAGNOSIS — R19.7 DIARRHEA, UNSPECIFIED TYPE: ICD-10-CM

## 2019-12-11 DIAGNOSIS — R19.7 DIARRHEA, UNSPECIFIED TYPE: Primary | ICD-10-CM

## 2019-12-11 PROCEDURE — 83993 ASSAY FOR CALPROTECTIN FECAL: CPT

## 2019-12-11 NOTE — DISCHARGE INSTRUCTIONS
RETURN IF WORSE IN ANY WAY:   INCREASED PAIN OR REDNESS OR DISCHARGE OR FEVER OR FLU LIKE SYMPTOMS, OR NEW AND CONCERNING SYMPTOMS SIGNS OR SYMPTOMS:    PLEASE CALL YOUR PRIMARY DOCTOR IN THE MORNING TO SET UP FOLLOW UP   PLEASE REVIEW THE WORK UP RESULTS WITH YOUR DOCTOR

## 2019-12-11 NOTE — ED PROVIDER NOTES
History  Chief Complaint   Patient presents with    Finger Laceration     According to the patient, she had cut her left thumb on a pill cutter about 30 mins ago  55 YO RIGHT HANDED FEMALE  PMH:   IBD    LOCATION:   PT HERE FOR LEFT THUMB LAC    TIME OCCURRED:   1 hour ago    MECHANISM:   PT WAS USING HER PILL CUTTER, WHICH IS PARTIALLY BROKEN, SHE SLIPPED AND IT SLICED THE MIDDLE OF HER THUMB     INTERVENTIONS:   NONE    CONCERN FOR ARTERIAL INJURY:   NONE  NO PULSATILE BLEEDING    PAIN:   DESCRIBED AS MILD AND WELL CONTROLLED    NEUROVASCULAR STATUS:   PT HAS GOOD SENSATION, GOOD MOTOR FUNCTION, GOOD COLOR OF FINGER    TETNUS:   PT UNSURE    NO OTHER COMPLAINTS        History provided by:  Patient  Laceration   Location:  Finger  Finger laceration location:  L thumb  Depth:  Cutaneous  Injury mechanism: PILL CUTTER  Pain details:     Quality:  Aching    Severity:  Moderate    Progression:  Unchanged  Foreign body present:  No foreign bodies  Relieved by:  Nothing  Worsened by:  Nothing  Tetanus status:  Unknown  Associated symptoms: no focal weakness, no numbness, no rash, no redness, no swelling and no streaking        Prior to Admission Medications   Prescriptions Last Dose Informant Patient Reported? Taking?    Cyanocobalamin (VITAMIN B 12 PO)   Yes No   Sig: Take by mouth   amLODIPine (NORVASC) 5 mg tablet   Yes No   Sig: Take 5 mg by mouth as needed    cholecalciferol (VITAMIN D3) 1,000 units tablet   Yes No   Sig: Take 2,000 Units by mouth daily   ferrous sulfate (IRON SUPPLEMENT) 325 (65 Fe) mg tablet   Yes No   Sig: Take 325 mg by mouth daily with breakfast   fluticasone (FLONASE) 50 mcg/act nasal spray   No No   Si spray into each nostril daily   levothyroxine 100 mcg tablet   Yes No   Sig: Take 100 mcg by mouth daily   pramipexole (MIRAPEX) 0 5 mg tablet   Yes No   Sig: Take 0 5 mg by mouth 2 (two) times a day     topiramate (TOPAMAX) 25 mg tablet   Yes No   Sig: Take 25 mg by mouth daily at bedtime    topiramate (TOPAMAX) 50 MG tablet   Yes No   urea (CARMOL) 40 % ointment   Yes No   Sig: Apply topically      Facility-Administered Medications: None       Past Medical History:   Diagnosis Date    Bulging of cervical intervertebral disc     Disease of thyroid gland     Migraines     Neck pain, chronic     Polycystic ovarian syndrome     PONV (postoperative nausea and vomiting)     TMJ (temporomandibular joint syndrome)        Past Surgical History:   Procedure Laterality Date    ADENOIDECTOMY      ANKLE SURGERY Left     reconstruction     SECTION      x2    DIAGNOSTIC LAPAROSCOPY      HYSTERECTOMY      MT ARTHRODESIS ANT INTERBODY INC DISCECTOMY, CERVICAL BELOW C2 Bilateral 2019    Procedure: C5/6 ANTERIOR CERVICAL DECOMPRESSION AND FUSION;  Surgeon: Edgar Adamson MD;  [facility];  Service: Neurosurgery    TONSILLECTOMY      TUBAL LIGATION         History reviewed  No pertinent family history  I have reviewed and agree with the history as documented  Social History     Tobacco Use    Smoking status: Current Every Day Smoker     Packs/day: 1 00     Types: Cigarettes    Smokeless tobacco: Never Used   Substance Use Topics    Alcohol use: No    Drug use: Never        Review of Systems   Skin: Positive for wound (LEFT THUMB LACERATION)  Negative for rash  Neurological: Negative for dizziness, focal weakness, weakness, light-headedness, numbness and headaches  All other systems reviewed and are negative  Physical Exam  Physical Exam   Constitutional: She is oriented to person, place, and time  She appears well-developed and well-nourished  No distress  HENT:   Head: Normocephalic and atraumatic  Nose: Nose normal    Eyes: Pupils are equal, round, and reactive to light  Conjunctivae and EOM are normal    Neck: Normal range of motion  Neck supple  No JVD present  Pulmonary/Chest: Effort normal  No respiratory distress  Abdominal: Soft   Bowel sounds are normal  She exhibits no distension  There is no tenderness  Musculoskeletal: Normal range of motion  She exhibits tenderness (ttp over the left thumb laceration)  She exhibits no edema or deformity  Lymphadenopathy:     She has no cervical adenopathy  Neurological: She is alert and oriented to person, place, and time  No cranial nerve deficit or sensory deficit  She exhibits normal muscle tone  Coordination normal    Skin: Skin is warm  Capillary refill takes less than 2 seconds  No rash noted  She is not diaphoretic  No erythema  No pallor  Linear 2 cm laceration over the middle of the pad of left thumb, no foreign bodies, no active bleeding, very superficial, less than 1 mm depth   Psychiatric: She has a normal mood and affect   Her behavior is normal  Judgment and thought content normal        Vital Signs  ED Triage Vitals [12/10/19 2038]   Temperature Pulse Respirations Blood Pressure SpO2   97 5 °F (36 4 °C) 69 18 128/62 100 %      Temp Source Heart Rate Source Patient Position - Orthostatic VS BP Location FiO2 (%)   Temporal Monitor Sitting Right arm --      Pain Score       5           Vitals:    12/10/19 2038   BP: 128/62   Pulse: 69   Patient Position - Orthostatic VS: Sitting         Visual Acuity      ED Medications  Medications - No data to display    Diagnostic Studies  Results Reviewed     None                 No orders to display              Procedures  Procedures         ED Course  ED Course as of Dec 11 0614   Tue Dec 10, 2019   2153 Laceration repaired w/ glue  Pt tolerated well                                   MDM      Disposition  Final diagnoses:   Laceration of left thumb without foreign body without damage to nail, initial encounter     Time reflects when diagnosis was documented in both MDM as applicable and the Disposition within this note     Time User Action Codes Description Comment    12/10/2019  9:54 PM Neena Rao Add [R21 012A] Laceration of left thumb without foreign body without damage to nail, initial encounter       ED Disposition     ED Disposition Condition Date/Time Comment    Discharge Stable Tue Dec 10, 2019  9:54 PM Amy Carlson discharge to home/self care  Follow-up Information     Follow up With Specialties Details Why Brando Bundy MD Family Medicine Call  As needed [address]  [address] [place] [address]  [phone]            Discharge Medication List as of 12/10/2019  9:55 PM      CONTINUE these medications which have NOT CHANGED    Details   amLODIPine (NORVASC) 5 mg tablet Take 5 mg by mouth as needed , Starting Fri 4/6/2018, Historical Med      cholecalciferol (VITAMIN D3) 1,000 units tablet Take 2,000 Units by mouth daily, Historical Med      Cyanocobalamin (VITAMIN B 12 PO) Take by mouth, Historical Med      ferrous sulfate (IRON SUPPLEMENT) 325 (65 Fe) mg tablet Take 325 mg by mouth daily with breakfast, Historical Med      fluticasone (FLONASE) 50 mcg/act nasal spray 1 spray into each nostril daily, Starting Mon 6/3/2019, Normal      levothyroxine 100 mcg tablet Take 100 mcg by mouth daily, Historical Med      pramipexole (MIRAPEX) 0 5 mg tablet Take 0 5 mg by mouth 2 (two) times a day  , Historical Med      !! topiramate (TOPAMAX) 25 mg tablet Take 25 mg by mouth daily at bedtime , Historical Med      !! topiramate (TOPAMAX) 50 MG tablet Starting Thu 4/25/2019, Historical Med      urea (CARMOL) 40 % ointment Apply topically, Starting Fri 6/1/2018, Historical Med       !! - Potential duplicate medications found  Please discuss with provider  No discharge procedures on file      ED Provider  Electronically Signed by           Malaika Moses MD  12/11/19 8697

## 2019-12-11 NOTE — ED PROCEDURE NOTE
PROCEDURE  Laceration repair  Date/Time: 12/10/2019 9:50 PM  Performed by: Brandon Gonzalez MD  Authorized by: Brandon Gonzalez MD   Consent: Verbal consent obtained  Risks and benefits: risks, benefits and alternatives were discussed  Consent given by: patient  Patient understanding: patient states understanding of the procedure being performed  Patient consent: the patient's understanding of the procedure matches consent given  Site marked: the operative site was marked  Patient identity confirmed: verbally with patient  Time out: Immediately prior to procedure a "time out" was called to verify the correct patient, procedure, equipment, support staff and site/side marked as required    Body area: upper extremity  Location details: left thumb  Laceration length: 2 cm  Foreign bodies: no foreign bodies  Tendon involvement: none  Nerve involvement: none  Vascular damage: no    Sedation:  Patient sedated: no      Wound Dehiscence:  Superficial Wound Dehiscence: simple closure      Procedure Details:  Irrigation solution: saline  Amount of cleaning: standard  Debridement: none  Degree of undermining: none  Skin closure: glue  Technique: simple  Approximation: close  Approximation difficulty: simple  Dressing: 4x4 sterile gauze and gauze roll  Patient tolerance: Patient tolerated the procedure well with no immediate complications           Brandon Gonzalez MD  12/11/19 [id number]

## 2019-12-15 LAB — CALPROTECTIN STL-MCNT: 28 UG/G (ref 0–120)

## 2020-01-08 ENCOUNTER — OFFICE VISIT (OUTPATIENT)
Dept: URGENT CARE | Facility: HOSPITAL | Age: 50
End: 2020-01-08
Payer: COMMERCIAL

## 2020-01-08 ENCOUNTER — APPOINTMENT (OUTPATIENT)
Dept: RADIOLOGY | Facility: HOSPITAL | Age: 50
End: 2020-01-08
Payer: COMMERCIAL

## 2020-01-08 ENCOUNTER — TELEPHONE (OUTPATIENT)
Dept: NEUROSURGERY | Facility: CLINIC | Age: 50
End: 2020-01-08

## 2020-01-08 VITALS
OXYGEN SATURATION: 97 % | TEMPERATURE: 98 F | RESPIRATION RATE: 18 BRPM | WEIGHT: 280.6 LBS | DIASTOLIC BLOOD PRESSURE: 74 MMHG | HEART RATE: 90 BPM | SYSTOLIC BLOOD PRESSURE: 129 MMHG | BODY MASS INDEX: 40.17 KG/M2 | HEIGHT: 70 IN

## 2020-01-08 DIAGNOSIS — Z98.1 STATUS POST ARTHRODESIS: ICD-10-CM

## 2020-01-08 DIAGNOSIS — J01.00 ACUTE NON-RECURRENT MAXILLARY SINUSITIS: Primary | ICD-10-CM

## 2020-01-08 PROCEDURE — G0382 LEV 3 HOSP TYPE B ED VISIT: HCPCS

## 2020-01-08 PROCEDURE — 72040 X-RAY EXAM NECK SPINE 2-3 VW: CPT

## 2020-01-08 RX ORDER — DOXYCYCLINE 100 MG/1
100 TABLET ORAL 2 TIMES DAILY
Qty: 14 TABLET | Refills: 0 | Status: SHIPPED | OUTPATIENT
Start: 2020-01-08 | End: 2020-01-15

## 2020-01-08 RX ORDER — OMEPRAZOLE 20 MG/1
1 CAPSULE, DELAYED RELEASE ORAL DAILY
COMMUNITY
Start: 2019-12-18 | End: 2020-07-30

## 2020-01-08 NOTE — TELEPHONE ENCOUNTER
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 I am also having a lot of pain in my lower back as well  Is that Something that I would see you for?   Thanks

## 2020-01-08 NOTE — LETTER
January 8, 2020     Patient: Sindhu Price   YOB: 1970   Date of Visit: 1/8/2020       To Whom It May Concern: It is my medical opinion that Sindhu Price may return to work on 01/10/2020  If you have any questions or concerns, please don't hesitate to call           Sincerely,        JOSE CAR    CC: No Recipients

## 2020-01-08 NOTE — TELEPHONE ENCOUNTER
While looking into patient's chart I realized she never came for her 3 month f/u w/ xr w/ [facility] that was scheduled in October so I rescheduled that appt for her and advised her to obtain the x-ray that was already in patient's chart from before  She is scheduled for next Wednesday at [address] [place] [address] and she is aware she needs to complete x-ray prior

## 2020-01-09 ENCOUNTER — OFFICE VISIT (OUTPATIENT)
Dept: NEUROSURGERY | Facility: CLINIC | Age: 50
End: 2020-01-09
Payer: COMMERCIAL

## 2020-01-09 VITALS
HEIGHT: 70 IN | SYSTOLIC BLOOD PRESSURE: 133 MMHG | RESPIRATION RATE: 16 BRPM | DIASTOLIC BLOOD PRESSURE: 83 MMHG | BODY MASS INDEX: 40.09 KG/M2 | HEART RATE: 61 BPM | TEMPERATURE: 97.6 F | WEIGHT: 280 LBS

## 2020-01-09 DIAGNOSIS — M48.02 CERVICAL SPINAL STENOSIS: Primary | ICD-10-CM

## 2020-01-09 DIAGNOSIS — M54.12 RADICULOPATHY, CERVICAL: ICD-10-CM

## 2020-01-09 PROCEDURE — 99213 OFFICE O/P EST LOW 20 MIN: CPT | Performed by: NEUROLOGICAL SURGERY

## 2020-01-09 NOTE — PATIENT INSTRUCTIONS
Rest and drink extra fluids  Start antibiotic  Take probiotic  Flonase allergy medication can be helpful with sinus congestion  Tylenol as needed for pain or fever  Nasal saline or Neti pot flushes can be helpful  Follow-up family doctor no improvement  Go to the ER with any worsening symptoms, chest pain, shortness of breath or difficulty breathing

## 2020-01-09 NOTE — PROGRESS NOTES
Assessment/Plan:    No problem-specific Assessment & Plan notes found for this encounter  Problem List Items Addressed This Visit        Nervous and Auditory    Radiculopathy, cervical    Relevant Orders    Ambulatory referral to Pain Management    Ambulatory referral to Physical Therapy       Other    Cervical spinal stenosis - Primary    Relevant Orders    Ambulatory referral to Pain Management    Ambulatory referral to Physical Therapy            Subjective:      Patient ID: Venita Lopez is a 54 y o  female  HPI    The following portions of the patient's history were reviewed and updated as appropriate:   She  has a past medical history of Bulging of cervical intervertebral disc, Disease of thyroid gland, Migraines, Neck pain, chronic, Polycystic ovarian syndrome, PONV (postoperative nausea and vomiting), and TMJ (temporomandibular joint syndrome)  She   Patient Active Problem List    Diagnosis Date Noted    Cervical spinal stenosis 04/15/2019    Radiculopathy, cervical 04/15/2019     She  has a past surgical history that includes Hysterectomy; Tubal ligation; Ankle surgery (Left); Tonsillectomy; Adenoidectomy;  section; Diagnostic laparoscopy; and pr arthrodesis ant interbody inc discectomy, cervical below c2 (Bilateral, 2019)  Her family history is not on file  She  reports that she has been smoking cigarettes  She has been smoking about 1 00 pack per day  She has never used smokeless tobacco  She reports that she does not drink alcohol or use drugs    Current Outpatient Medications   Medication Sig Dispense Refill    amLODIPine (NORVASC) 5 mg tablet Take 5 mg by mouth as needed       cholecalciferol (VITAMIN D3) 1,000 units tablet Take 2,000 Units by mouth daily      Cyanocobalamin (VITAMIN B 12 PO) Take by mouth      doxycycline (ADOXA) 100 MG tablet Take 1 tablet (100 mg total) by mouth 2 (two) times a day for 7 days 14 tablet 0    ferrous sulfate (IRON SUPPLEMENT) 325 (65 Fe) mg tablet Take 325 mg by mouth daily with breakfast      levothyroxine 100 mcg tablet Take 100 mcg by mouth daily      omeprazole (PriLOSEC) 20 mg delayed release capsule Take 1 capsule by mouth daily       pramipexole (MIRAPEX) 0 5 mg tablet Take 0 5 mg by mouth 2 (two) times a day        topiramate (TOPAMAX) 25 mg tablet Take 25 mg by mouth daily at bedtime       fluticasone (FLONASE) 50 mcg/act nasal spray 1 spray into each nostril daily (Patient not taking: Reported on 1/8/2020) 1 Bottle 0    topiramate (TOPAMAX) 50 MG tablet       urea (CARMOL) 40 % ointment Apply topically       No current facility-administered medications for this visit  Current Outpatient Medications on File Prior to Visit   Medication Sig    amLODIPine (NORVASC) 5 mg tablet Take 5 mg by mouth as needed     cholecalciferol (VITAMIN D3) 1,000 units tablet Take 2,000 Units by mouth daily    Cyanocobalamin (VITAMIN B 12 PO) Take by mouth    doxycycline (ADOXA) 100 MG tablet Take 1 tablet (100 mg total) by mouth 2 (two) times a day for 7 days    ferrous sulfate (IRON SUPPLEMENT) 325 (65 Fe) mg tablet Take 325 mg by mouth daily with breakfast    levothyroxine 100 mcg tablet Take 100 mcg by mouth daily    omeprazole (PriLOSEC) 20 mg delayed release capsule Take 1 capsule by mouth daily     pramipexole (MIRAPEX) 0 5 mg tablet Take 0 5 mg by mouth 2 (two) times a day      topiramate (TOPAMAX) 25 mg tablet Take 25 mg by mouth daily at bedtime     fluticasone (FLONASE) 50 mcg/act nasal spray 1 spray into each nostril daily (Patient not taking: Reported on 1/8/2020)    topiramate (TOPAMAX) 50 MG tablet     urea (CARMOL) 40 % ointment Apply topically     No current facility-administered medications on file prior to visit  She is allergic to amoxicillin; codeine; diclofenac; penicillins; sulfa antibiotics; diclofenac sodium; and etodolac       Review of Systems   Constitutional: Negative for chills, fatigue and fever  Eyes: Negative  Respiratory: Negative for shortness of breath and wheezing  Cardiovascular: Negative for chest pain and palpitations  Gastrointestinal: Negative  Endocrine: Negative  Genitourinary: Negative  Musculoskeletal: Positive for back pain, neck pain (down right arm) and neck stiffness  Skin: Negative  Allergic/Immunologic: Negative  Neurological: Positive for numbness (past 2 days experienced some Right arm numbness)  Negative for dizziness, seizures, weakness and headaches  Hematological: Does not bruise/bleed easily  Psychiatric/Behavioral: Negative for sleep disturbance (improved sleeping)  Objective:      /83 (BP Location: Left arm, Patient Position: Sitting, Cuff Size: Standard)   Pulse 61   Temp 97 6 °F (36 4 °C) (Tympanic)   Resp 16   Ht 5' 10" (1 778 m)   Wt 127 kg (280 lb)   BMI 40 18 kg/m²          Physical Exam       I have personally obtain history and examined patient  I have personally reviewed case including all pertinent investigations/studies      Time spent 15 minutes  More than 50% of total time spent on counseling and coordination of care as described above including patient education      HPI     8 months post op ACDF  Doing well with resolution of neck and arm pain  Back in the gym  No analgesic use   Some neck and arm stiffness with pain since resuming work in the fall      Exam     Well healed incision  Full strength bilateral UE and LE  Intact sensation and dtr  Normal fine motor and coordination  Normal cervical ROM                             Neck:   Supple, symmetrical, trachea midline, no adenopathy;        thyroid:  No enlargement/tenderness/nodules; no carotid    bruit or JVD   Back:     Symmetric, no curvature, ROM normal, no CVA tenderness                                       Extremities:   Extremities normal, atraumatic, no cyanosis or edema       Skin:   Skin color, texture, turgor normal, no rashes or lesions    Radiology     Xray     Anatomic alignment with all hardware and screws in expected position  No cage settling  Vigorous interbody fusion mass     Summary and Plan     Ms Barger is overall doing well 8 months post op  We reviewed activity restrictions as well as the need for ongoing home PT and ROM exercises  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

## 2020-01-13 ENCOUNTER — TELEPHONE (OUTPATIENT)
Dept: NEUROSURGERY | Facility: CLINIC | Age: 50
End: 2020-01-13

## 2020-01-13 NOTE — PROGRESS NOTES
[facility]        NAME: Ranulfo Wills is a 54 y o  female  : 1970    MRN: [id number]  DATE: 2020  TIME: 19:30    Assessment and Plan   Acute non-recurrent maxillary sinusitis [J01 00]  1  Acute non-recurrent maxillary sinusitis  doxycycline (ADOXA) 100 MG tablet         Patient Instructions     Patient Instructions   Rest and drink extra fluids  Start antibiotic  Take probiotic  Flonase allergy medication can be helpful with sinus congestion  Tylenol as needed for pain or fever  Nasal saline or Neti pot flushes can be helpful  Follow-up family doctor no improvement  Go to the ER with any worsening symptoms, chest pain, shortness of breath or difficulty breathing  Chief Complaint     Chief Complaint   Patient presents with    Cold Like Symptoms     Patient c/o cough, runny nose, and BL ear pain x 3 days         History of Present Illness   Ranulfo Wills presents to the clinic c/o    This is a 79-year-old female here today with complaints cough, runny nose and ear pain  She is also having nasal congestion  She states symptoms started 3 days ago  No fevers at this time  No shortness of breath or chest pain  Not using anything over-the-counter  She is complaining of sinus pressure  Review of Systems   Review of Systems   Constitutional: Positive for fatigue  Negative for chills and fever  HENT: Positive for congestion, rhinorrhea, sinus pressure and sinus pain  Respiratory: Negative for cough  Cardiovascular: Negative  Skin: Negative  Neurological: Negative  Psychiatric/Behavioral: Negative            Current Medications     Long-Term Medications   Medication Sig Dispense Refill    ferrous sulfate (IRON SUPPLEMENT) 325 (65 Fe) mg tablet Take 325 mg by mouth daily with breakfast      levothyroxine 100 mcg tablet Take 100 mcg by mouth daily      omeprazole (PriLOSEC) 20 mg delayed release capsule Take 1 capsule by mouth daily       pramipexole (MIRAPEX) 0 5 mg tablet Take 0 5 mg by mouth 2 (two) times a day        topiramate (TOPAMAX) 25 mg tablet Take 25 mg by mouth daily at bedtime       fluticasone (FLONASE) 50 mcg/act nasal spray 1 spray into each nostril daily (Patient not taking: Reported on 1/8/2020) 1 Bottle 0    topiramate (TOPAMAX) 50 MG tablet       urea (CARMOL) 40 % ointment Apply topically         Current Allergies     Allergies as of 01/08/2020 - Reviewed 01/08/2020   Allergen Reaction Noted    Amoxicillin Rash 05/05/2018    Codeine Rash and Other (See Comments) 05/05/2018    Diclofenac Rash 04/24/2019    Penicillins Other (See Comments) 05/05/2018    Sulfa antibiotics Other (See Comments) 05/05/2018    Diclofenac sodium Rash 04/08/2019    Etodolac Rash and Other (See Comments) 06/01/2018            The following portions of the patient's history were reviewed and updated as appropriate: allergies, current medications, past family history, past medical history, past social history, past surgical history and problem list     Objective   /74   Pulse 90   Temp 98 °F (36 7 °C) (Tympanic)   Resp 18   Ht 5' 10" (1 778 m)   Wt 127 kg (280 lb 9 6 oz)   SpO2 97%   BMI 40 26 kg/m²        Physical Exam     Physical Exam   Constitutional: She is oriented to person, place, and time  She appears well-developed and well-nourished  HENT:   Right Ear: External ear normal    Left Ear: External ear normal    Sinus pressure   Neck: Normal range of motion  Neck supple  Cardiovascular: Normal rate and regular rhythm  Pulmonary/Chest: Effort normal and breath sounds normal    Neurological: She is alert and oriented to person, place, and time  Psychiatric: She has a normal mood and affect  Her behavior is normal    Nursing note and vitals reviewed

## 2020-01-13 NOTE — TELEPHONE ENCOUNTER
Received call from patient's dental office asking if she needs to be pre-medicated prior to her dental cleaning  I informed them that patient does not require any premedication  They were appreciative

## 2020-01-14 ENCOUNTER — APPOINTMENT (EMERGENCY)
Dept: RADIOLOGY | Facility: HOSPITAL | Age: 50
End: 2020-01-14
Payer: COMMERCIAL

## 2020-01-14 ENCOUNTER — HOSPITAL ENCOUNTER (EMERGENCY)
Facility: HOSPITAL | Age: 50
Discharge: HOME | End: 2020-01-14
Attending: EMERGENCY MEDICINE | Admitting: EMERGENCY MEDICINE
Payer: COMMERCIAL

## 2020-01-14 ENCOUNTER — TELEPHONE (OUTPATIENT)
Dept: PAIN MEDICINE | Facility: CLINIC | Age: 50
End: 2020-01-14

## 2020-01-14 VITALS
HEART RATE: 66 BPM | RESPIRATION RATE: 16 BRPM | DIASTOLIC BLOOD PRESSURE: 78 MMHG | OXYGEN SATURATION: 99 % | WEIGHT: 280 LBS | TEMPERATURE: 98.6 F | SYSTOLIC BLOOD PRESSURE: 138 MMHG | BODY MASS INDEX: 40.18 KG/M2

## 2020-01-14 DIAGNOSIS — M54.2 NECK PAIN: Primary | ICD-10-CM

## 2020-01-14 PROCEDURE — 72050 X-RAY EXAM NECK SPINE 4/5VWS: CPT

## 2020-01-14 PROCEDURE — 99283 EMERGENCY DEPT VISIT LOW MDM: CPT

## 2020-01-14 PROCEDURE — 99284 EMERGENCY DEPT VISIT MOD MDM: CPT | Performed by: EMERGENCY MEDICINE

## 2020-01-14 RX ORDER — PREDNISONE 20 MG/1
40 TABLET ORAL ONCE
Status: COMPLETED | OUTPATIENT
Start: 2020-01-14 | End: 2020-01-14

## 2020-01-14 RX ORDER — PREDNISONE 20 MG/1
40 TABLET ORAL DAILY
Qty: 6 TABLET | Refills: 0 | Status: SHIPPED | OUTPATIENT
Start: 2020-01-14 | End: 2020-01-17

## 2020-01-14 RX ADMIN — PREDNISONE 40 MG: 20 TABLET ORAL at 21:05

## 2020-01-14 NOTE — TELEPHONE ENCOUNTER
Pt states she was referred back to [facility] for neck pain  Pt states Dr Alba Hutson would like her to have an injection  Schedule o/v first? Ph# [phone]  Please leave a VM

## 2020-01-15 ENCOUNTER — TRANSCRIBE ORDERS (OUTPATIENT)
Dept: NEUROSURGERY | Facility: CLINIC | Age: 50
End: 2020-01-15

## 2020-01-15 ENCOUNTER — TELEPHONE (OUTPATIENT)
Dept: NEUROSURGERY | Facility: CLINIC | Age: 50
End: 2020-01-15

## 2020-01-15 DIAGNOSIS — M48.02 CERVICAL SPINAL STENOSIS: Primary | ICD-10-CM

## 2020-01-15 DIAGNOSIS — M54.12 RADICULOPATHY, CERVICAL: Primary | ICD-10-CM

## 2020-01-15 RX ORDER — GABAPENTIN 300 MG/1
300 CAPSULE ORAL 3 TIMES DAILY
Qty: 30 CAPSULE | Refills: 0 | Status: SHIPPED | OUTPATIENT
Start: 2020-01-15 | End: 2020-01-27 | Stop reason: SDUPTHER

## 2020-01-15 RX ORDER — GABAPENTIN 300 MG/1
300 CAPSULE ORAL 3 TIMES DAILY
Qty: 30 CAPSULE | Refills: 0 | Status: CANCELLED | OUTPATIENT
Start: 2020-01-15

## 2020-01-15 NOTE — TELEPHONE ENCOUNTER
Per Dr Magdalena Saenz, will prescribe gabapentin 300mg TID which was e-scribed to her pharmacy on file  Went over use of gabapentin with patient and informed her that she should not abruptly stop this medication

## 2020-01-15 NOTE — TELEPHONE ENCOUNTER
Pt is s/p ACDF 8 months ago  Seen 1/9/2020 with referrals to therapy and S/P f/u here prn  She reports being a  and experiencing increase pain with working  ED visit yesterday  Placed on steroid and off work for remainder of week  She questions if she should continue working? Given that we do not place pts out of work except post-op, would it be advised that she get a functional capacitry eval at this time, or first do the f/u with therapy and S/P?   Thank You

## 2020-01-15 NOTE — TELEPHONE ENCOUNTER
----- Message from Kathy Allen MD sent at 1/15/2020 10:38 AM EST -----  Yes, that would be prudent at this point   [facility]

## 2020-01-15 NOTE — TELEPHONE ENCOUNTER
Notified pt of response and she would like to do work capacity eval stating she is concerned about her level of pain she is currently experiencing  Will do necessary requirements for [facility] rehab and send

## 2020-01-15 NOTE — ED PROVIDER NOTES
History  Chief Complaint   Patient presents with    Back Pain    Neck Pain     This is a 68-year-old female with acute on chronic neck pain  Patient has previously received surgery on her neck for chronic pain  She saw her neurosurgeon approximately 1 week ago and had imaging  At that time everything was going well and patient at improved  When the patient returned to work earlier today she noticed her neck pain was severe  She states it radiated down to her hand  There is no weakness but there are some mild paresthesias  Patient rates pain as severe, worse with palpation  Says there has been no trauma to the area  She has not had fevers  She has been tested for tuberculosis recently it was negative  No history of cancer  No bowel or bladder changes  No weakness anywhere  Prior to Admission Medications   Prescriptions Last Dose Informant Patient Reported? Taking?    Cyanocobalamin (VITAMIN B 12 PO)  Self Yes Yes   Sig: Take by mouth   amLODIPine (NORVASC) 5 mg tablet   Yes No   Sig: Take 5 mg by mouth as needed    cholecalciferol (VITAMIN D3) 1,000 units tablet   Yes Yes   Sig: Take 2,000 Units by mouth daily   doxycycline (ADOXA) 100 MG tablet   No Yes   Sig: Take 1 tablet (100 mg total) by mouth 2 (two) times a day for 7 days   ferrous sulfate (IRON SUPPLEMENT) 325 (65 Fe) mg tablet   Yes Yes   Sig: Take 325 mg by mouth daily with breakfast   fluticasone (FLONASE) 50 mcg/act nasal spray   No No   Si spray into each nostril daily   Patient not taking: Reported on 2020   levothyroxine 100 mcg tablet   Yes Yes   Sig: Take 100 mcg by mouth daily   omeprazole (PriLOSEC) 20 mg delayed release capsule   Yes Yes   Sig: Take 1 capsule by mouth daily    pramipexole (MIRAPEX) 0 5 mg tablet   Yes Yes   Sig: Take 0 5 mg by mouth 2 (two) times a day     topiramate (TOPAMAX) 25 mg tablet   Yes Yes   Sig: Take 25 mg by mouth daily at bedtime    topiramate (TOPAMAX) 50 MG tablet   Yes Yes   urea (CARMOL) 40 % ointment   Yes Yes   Sig: Apply topically      Facility-Administered Medications: None       Past Medical History:   Diagnosis Date    Bulging of cervical intervertebral disc     Disease of thyroid gland     Migraines     Neck pain, chronic     Polycystic ovarian syndrome     PONV (postoperative nausea and vomiting)     TMJ (temporomandibular joint syndrome)        Past Surgical History:   Procedure Laterality Date    ADENOIDECTOMY      ANKLE SURGERY Left     reconstruction     SECTION      x2    DIAGNOSTIC LAPAROSCOPY      HYSTERECTOMY      [place] ARTHRODESIS ANT INTERBODY INC DISCECTOMY, CERVICAL BELOW C2 Bilateral 2019    Procedure: C5/6 ANTERIOR CERVICAL DECOMPRESSION AND FUSION;  Surgeon: Whitley Cortés MD;  Location: [facility];  Service: Neurosurgery    TONSILLECTOMY      TUBAL LIGATION         History reviewed  No pertinent family history  I have reviewed and agree with the history as documented  Social History     Tobacco Use    Smoking status: Current Every Day Smoker     Packs/day: 1 00     Types: Cigarettes    Smokeless tobacco: Never Used   Substance Use Topics    Alcohol use: No    Drug use: Never        Review of Systems   Constitutional: Negative for activity change, fatigue and fever  HENT: Negative for congestion  Eyes: Negative for visual disturbance  Respiratory: Negative for cough, chest tightness and shortness of breath  Cardiovascular: Negative for chest pain  Gastrointestinal: Negative for abdominal pain, diarrhea and vomiting  Endocrine: Negative for polyuria  Genitourinary: Negative for difficulty urinating, dysuria and urgency  Skin: Negative for rash  Neurological: Negative for dizziness, weakness and numbness  Physical Exam  Physical Exam   Constitutional: She is oriented to person, place, and time  She appears well-developed and well-nourished  HENT:   Head: Normocephalic and atraumatic     Eyes: Pupils are equal, round, and reactive to light  Conjunctivae and EOM are normal    Neck: Normal range of motion  Neck supple  Cardiovascular: Normal rate, regular rhythm and normal heart sounds  Pulmonary/Chest: Effort normal and breath sounds normal  No respiratory distress  Abdominal: Soft  Bowel sounds are normal  She exhibits no distension  There is no tenderness  There is no guarding  Musculoskeletal: Normal range of motion  Mild pain to palpation over distal cervical spine  No bony step-offs appreciated  Neurological: She is alert and oriented to person, place, and time  Normal gait, full strength in all extremities, full sensation to touch in all extremities, no focal deficits   Skin: Skin is warm and dry  Capillary refill takes less than 2 seconds  Psychiatric: She has a normal mood and affect  Her behavior is normal        Vital Signs  ED Triage Vitals   Temperature Pulse Respirations Blood Pressure SpO2   01/14/20 1948 01/14/20 1948 01/14/20 1948 01/14/20 1948 01/14/20 1948   98 6 °F (37 °C) 78 18 137/78 100 %      Temp Source Heart Rate Source Patient Position - Orthostatic VS BP Location FiO2 (%)   01/14/20 1948 -- -- -- --   Temporal          Pain Score       01/14/20 1957       7           Vitals:    01/14/20 1948 01/14/20 2015 01/14/20 2030 01/14/20 2119   BP: 137/78   138/78   Pulse: 78 65 66 66         Visual Acuity      ED Medications  Medications   predniSONE tablet 40 mg (40 mg Oral Given 1/14/20 2105)       Diagnostic Studies  Results Reviewed     None                 XR spine cervical complete 4 or 5 vw non injury   ED Interpretation by John Putnam MD (01/14 2056)   NAF                 Procedures  Procedures         ED Course                               MDM  Number of Diagnoses or Management Options  Neck pain: established and worsening  Diagnosis management comments: This is a 82-year-old female with acute on chronic neck pain    Patient is already stable should Pain Management and Neurosurgery  Patient started on steroids for radiculopathy  Patient does not have concerning symptoms on review of systems  Patient appears clinically well  Note given for work excuse  Discussed warning signs and symptoms with the patient as well as when to return to the emergency department versus follow up with [facility]  Patient states understanding and agreement with the plan  Amount and/or Complexity of Data Reviewed  Tests in the radiology section of CPT®: ordered and reviewed          Disposition  Final diagnoses:   Neck pain     Time reflects when diagnosis was documented in both MDM as applicable and the Disposition within this note     Time User Action Codes Description Comment    1/14/2020  8:59 PM Ravi Ray Add [M54 2] Neck pain       ED Disposition     ED Disposition Condition Date/Time Comment    Discharge Stable Tue Jan 14, 2020  8:59 PM Kasie Gardner discharge to home/self care              Follow-up Information     Follow up With Specialties Details Why Contact Info    Tika George MD Neurosurgery Today  [facility] [place] [address]  [phone]            Discharge Medication List as of 1/14/2020  9:07 PM      START taking these medications    Details   predniSONE 20 mg tablet Take 2 tablets (40 mg total) by mouth daily for 3 days, Starting Tue 1/14/2020, Until Fri 1/17/2020, Normal         CONTINUE these medications which have NOT CHANGED    Details   cholecalciferol (VITAMIN D3) 1,000 units tablet Take 2,000 Units by mouth daily, Historical Med      Cyanocobalamin (VITAMIN B 12 PO) Take by mouth, Historical Med      doxycycline (ADOXA) 100 MG tablet Take 1 tablet (100 mg total) by mouth 2 (two) times a day for 7 days, Starting Wed 1/8/2020, Until Wed 1/15/2020, Normal      ferrous sulfate (IRON SUPPLEMENT) 325 (65 Fe) mg tablet Take 325 mg by mouth daily with breakfast, Historical Med      levothyroxine 100 mcg tablet Take 100 mcg by mouth daily, Historical Med omeprazole (PriLOSEC) 20 mg delayed release capsule Take 1 capsule by mouth daily , Starting Wed 12/18/2019, Historical Med      pramipexole (MIRAPEX) 0 5 mg tablet Take 0 5 mg by mouth 2 (two) times a day  , Historical Med      !! topiramate (TOPAMAX) 25 mg tablet Take 25 mg by mouth daily at bedtime , Historical Med      !! topiramate (TOPAMAX) 50 MG tablet Starting Thu 4/25/2019, Historical Med      urea (CARMOL) 40 % ointment Apply topically, Starting Fri 6/1/2018, Historical Med      amLODIPine (NORVASC) 5 mg tablet Take 5 mg by mouth as needed , Starting Fri 4/6/2018, Historical Med      fluticasone (FLONASE) 50 mcg/act nasal spray 1 spray into each nostril daily, Starting Mon 6/3/2019, Normal       !! - Potential duplicate medications found  Please discuss with provider  No discharge procedures on file      ED Provider  Electronically Signed by           Miguel Edwards MD  01/14/20 4334

## 2020-01-17 ENCOUNTER — EVALUATION (OUTPATIENT)
Dept: PHYSICAL THERAPY | Facility: CLINIC | Age: 50
End: 2020-01-17
Payer: COMMERCIAL

## 2020-01-17 DIAGNOSIS — R29.3 ABNORMAL POSTURE: ICD-10-CM

## 2020-01-17 DIAGNOSIS — M54.12 RADICULOPATHY, CERVICAL: ICD-10-CM

## 2020-01-17 DIAGNOSIS — M48.02 CERVICAL SPINAL STENOSIS: Primary | ICD-10-CM

## 2020-01-17 PROCEDURE — 97110 THERAPEUTIC EXERCISES: CPT | Performed by: PHYSICAL THERAPIST

## 2020-01-17 PROCEDURE — 97162 PT EVAL MOD COMPLEX 30 MIN: CPT | Performed by: PHYSICAL THERAPIST

## 2020-01-17 NOTE — PROGRESS NOTES
PT Evaluation     Today's date: 2020  Patient name: Blaze Dave  : 1970  MRN: [id number]  Referring provider: Momo Serrano MD  Dx:   Encounter Diagnosis     ICD-10-CM    1  Cervical spinal stenosis M48 02    2  Radiculopathy, cervical M54 12    3  Abnormal posture R29 3        Start Time: 1249  Stop Time: 0945  Total time in clinic (min): 55 minutes    Assessment  Assessment details: Blaze Dave was seen for an initial PT evaluation today  Patient is a 54 y o  female with diagnosis of cervicalgia and past medical history significant for left ankle surgery, migraines, disease of thyroid gland, TMJ, cervical fusion (2019)  Moderate complexity evaluation  due to number of participation restrictions, functional outcome measure of 48% limitation, and changing clinical presentation  Findings today show pain at end ranges of cervical motion, weakness of RUE with radiculopathy in ulnar nerve distribution, significant weakness of deep cervical flexiors, limited thoracic mobility and spasms/guarding of cervical and thoracic musculature likely contributing to pain  Skilled PT indicated to treat at this time to address pain, muscle tone, cervical strength, and postural mechanics to decrease pain and improve quality of life  Impairments: abnormal muscle firing, abnormal muscle tone, activity intolerance, impaired physical strength, lacks appropriate home exercise program, pain with function and poor posture     Goals  STG (6 weeks)  1  Patient will display good seated posture with decreased forward head and retracted shoulders for 2 consecutive sessions with 50% VC    2  Patient will have 2/10 cervical pain at rest    3  Patient will be able to maintain cervical flexion in supine for > 20 sec with increase of pain by 1-2 points  LTG (12 weeks)  1  Patient will display good lifting mechanics with 10# crate and no increased cervical or thoracic pain  2  Patient will be able to drive with 5/09 pain  3  Patient will be independent with home exercise program for continued maintenance post PT DC  Plan  Plan details: Progress note in 4 weeks  Patient would benefit from: skilled physical therapy  Planned modality interventions: unattended electrical stimulation, thermotherapy: hydrocollator packs and cryotherapy  Planned therapy interventions: manual therapy, neuromuscular re-education, therapeutic activities, therapeutic exercise and home exercise program  Frequency: 2-3x week  Plan of Care beginning date: 2020  Plan of Care expiration date: 2020  Treatment plan discussed with: patient and PTA        Subjective Evaluation    History of Present Illness  Date of onset: 2020  Mechanism of injury: Yandel Moulton is a 54 y o  female who presents to outpatient Physical Therapy today with complaints of cervical pain  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  Initially felt neck soreness, but went away  Last week began having significant increased pain with radicular symptoms  Saw surgeon last week where x-ray was taken, patient states review of x-ray doctor stated increased degeneration at caudal superior and inferior segments of fusion  History of C5-6 cervical fusion in 2019  Did go to ED earlier this week due to significant pain and was given a steroid  Pain  Current pain ratin  At best pain ratin  At worst pain ratin  Location: posterior neck, upper back, right shoulder, suboccipital region  Quality: knife-like, radiating, sharp, dull ache and cramping  Relieving factors: heat  Progression: worsening    Social Support    Employment status: working (Currently out due to this injury   of [employer])  Patient Goals  Patient goals for therapy: decreased pain, increased strength and return to work  Patient goal: Would like to not be on medication  Objective     Concurrent Complaints  Positive for headaches (Daily)   Negative for dizziness, tinnitus, trouble swallowing, difficulty breathing and respiratory pain    Postural Observations    Additional Postural Observation Details  Upper crossed    Palpation   Left   Hypertonic in the levator scapulae and upper trapezius  Tenderness of the levator scapulae, lower trapezius, middle trapezius, scalenes and upper trapezius  Trigger point to upper trapezius  Right   Hypertonic in the levator scapulae and upper trapezius  Tenderness of the levator scapulae, lower trapezius, middle trapezius, scalenes and upper trapezius  Trigger point to upper trapezius  Tenderness   Cervical Spine   Tenderness in the left scapula, left transverse process, right scapula and right transverse process  Neurological Testing     Sensation   Cervical/Thoracic   Left   Intact: light touch    Right   Intact: light touch    Active Range of Motion   Cervical/Thoracic Spine       Cervical    Flexion: 50 degrees   Extension: 55 degrees     with pain  Left lateral flexion: 30 degrees      Right lateral flexion: 35 degrees      Left rotation: 65 degrees  Right rotation: 61 degrees             Strength/Myotome Testing     Left Shoulder     Planes of Motion   Extension: WFL   Abduction: WFL   Adduction: [facility]   External rotation at 0°: [facility]   Internal rotation at 0°: WFL     Right Shoulder     Planes of Motion   Flexion: 4   Abduction: 4-   External rotation at 0°: 4+   Internal rotation at 0°: 4+     Left Elbow   Flexion: WFL  Extension: WFL    Right Elbow   Flexion: 5  Extension: 5    Left Wrist/Hand   Radial deviation: WFL     (2nd hand position)     Trial 1: 61    Trial 2: 58    Trial 3: 62    Right Wrist/Hand      (2nd hand position)     Trial 1: 55    Trial 2: 46    Trial 3: 52    Tests   Cervical   Positive neck flexor muscle endurance test   Negative Sharp-Fantasma test      Left   Negative Spurling's Test B  Right   Negative Spurling's Test B  Left Shoulder   Negative ULTT4  Right Shoulder   Positive ULTT4  Additional Tests Details  Deep cervical flexor endurance= 15 sec with pain "down center of spine"  Pain with manual cervical distraction      General Comments:      Shoulder Comments   Bilateral shoulder AROM WNL, pain in upper thoracic and cervical spine at end ranges  Cervical/Thoracic Comments      FOTO: 52% function, 68% predicted function     Neuro Exam:     Headaches   Patient reports headaches: Yes (Daily)         Flowsheet Rows      Most Recent Value   PT/OT G-Codes   Current Score  52   Projected Score  68             Precautions: cervical fusion 4/2019       Re-evaluation: 2/17  POC:4/17    Cervical flowsheet    Manual  1/17       UT stretch        LS stretch        SOR        Cervical distraction Pain; DC       1st rib mob Grade III-IV       T/S PA mobs        C/S PA and side glide mobs        Scapular PNF        IASTM            Exercise Diary  1/17       UBE        UT stretch 3x30"       LS stretch 3x30"       Doorway stretch        C/S AROM nv       Chin tucks nv       Shoulder rolls 20x       scap retraction 20x       MTP/LTP nv       Serratus punch        Prone row        Prone I's         Prone T's (thumbs up and down)        Standing YTI        Lat pull down        Ulnar nerve floss 10x       Seated thoracic extension nv                                   Modalities 1/17       MHP nv

## 2020-01-20 ENCOUNTER — OFFICE VISIT (OUTPATIENT)
Dept: PHYSICAL THERAPY | Facility: CLINIC | Age: 50
End: 2020-01-20
Payer: COMMERCIAL

## 2020-01-20 DIAGNOSIS — M54.12 RADICULOPATHY, CERVICAL: Primary | ICD-10-CM

## 2020-01-20 PROCEDURE — 97140 MANUAL THERAPY 1/> REGIONS: CPT

## 2020-01-20 PROCEDURE — 97110 THERAPEUTIC EXERCISES: CPT

## 2020-01-20 NOTE — PROGRESS NOTES
Daily Note     Today's date: 2020  Patient name: Sally Tucker  : 1970  MRN: [id number]  Referring provider: Yumiko Kathleen MD  Dx:   Encounter Diagnosis     ICD-10-CM    1  Radiculopathy, cervical M54 12                   Subjective:  Pt has a multitude of sx today, including spasm @ L scap region, Mid back pain, and radicular sx @ R UE, all as per her feedback  Objective: See treatment diary below      Assessment: Tolerated treatment Only FAIR t/o treatment session with performance of ther exer and manual therapy applied  Best results were with MHP applic  Louise Serum Patient would benefit from continued PT        Plan: Con't services 2x/week as per POC/Goals set forth by 1* therapist         Precautions: cervical fusion 2019       Re-evaluation:   POC:    Cervical flowsheet    Manual   1 20 20      UT stretch  *Reviewed for self stretch  B 3x/30"        LS stretch  *Reviewed for self stretch   3x/30"      SOR          Cervical distraction Pain; DC _ _      1st rib mob Grade III-IV       T/S PA mobs        C/S PA and side glide mobs        Scapular PNF        IASTM        Massage x 15 min  **L scap  **R c-spine para spinals  **R UT                  Exercise Diary   1 20 20      UBE          UT stretch 3x30" 3x30"        LS stretch 3x30" 3x30"        Doorway stretch        C/S AROM nv 1x5/3" ea        Chin tucks nv 1x5/3" ea        Shoulder rolls 20x 20x        scap retraction 20x 20x        MTP/LTP nv         Serratus punch  *1x3/3"        Prone row          Prone I's           Prone T's (thumbs up and down)        Standing YTI          Lat pull down          Ulnar nerve floss 10x       Seated thoracic extension nv 1x5/5"                                  Modalities  1 20 20      MHP nv Pre-ex  Cerv/B UT  10 min

## 2020-01-21 ENCOUNTER — TRANSCRIBE ORDERS (OUTPATIENT)
Dept: NEUROSURGERY | Facility: CLINIC | Age: 50
End: 2020-01-21

## 2020-01-21 DIAGNOSIS — G89.29 CHRONIC LOW BACK PAIN, UNSPECIFIED BACK PAIN LATERALITY, UNSPECIFIED WHETHER SCIATICA PRESENT: Primary | ICD-10-CM

## 2020-01-21 DIAGNOSIS — M54.50 CHRONIC LOW BACK PAIN, UNSPECIFIED BACK PAIN LATERALITY, UNSPECIFIED WHETHER SCIATICA PRESENT: Primary | ICD-10-CM

## 2020-01-22 NOTE — PROGRESS NOTES
Daily Note     Today's date: 2020  Patient name: Parveen Stone  : 1970  MRN: [id number]  Referring provider: Dianna Elizabeth MD  Dx:   Encounter Diagnosis     ICD-10-CM    1  Radiculopathy, cervical M54 12    2  Cervical spinal stenosis M48 02    3  Abnormal posture R29 3        Start Time: 1100  Stop Time: 1145  Total time in clinic (min): 45 minutes    Subjective: Patient states significant increase in pain since yesterday afternoon  Unsure of cause, states she did nothing different  Tension and pain with all cervical and upper extremity movement  Has been consistent with HEP throughout the day  Called pain management yesterday and this am due to significant pain levels, but had not yet had a return call  Objective: See treatment diary below    Upper trap and levator scap tightness and trigger points R>L  Educated on trigger point release with tennis ball for home  Atrophy noted of bilateral scapular musculature (rhomboids, middle trap) contributing to shoulder girdle weakness and poor posture  Assessment: Pain at all end ranges of cervical AROM  Patient educated on importance of maintaining cervical mobility and pain/spasm/pain cycle  Responded well to electric stimulation with decrease in pain levels, would benefit from a home TENs unit  Plan: Continue per plan of care  Received script for low back to add into program during re-evaluation next session        Precautions: cervical fusion 2019       Re-evaluation:   POC:    Cervical flowsheet    Manual   20      UT stretch  *Reviewed for self stretch  B 3x/30"   Right UT trigger point release, 2 lesions x 1 min each     LS stretch  *Reviewed for self stretch   3x/30"      SOR          Cervical distraction Pain; DC _ _      1st rib mob Grade III-IV       T/S PA mobs        C/S PA and side glide mobs        Scapular PNF        IASTM   Graston to bilateral superior shoulder and upper thoracic region using GT 3,4,5 in sitting     Massage x 15 min  **L scap  **R c-spine para spinals  **R UT              1/23/20: Reviewed Graston Technique with patient, questionnaire was signed         Exercise Diary  1/17 1 20 20 1/23     UBE          UT stretch 3x30" 3x30"   nv     LS stretch 3x30" 3x30"   nv     Doorway stretch        C/S AROM nv 1x5/3" ea   5x ea     Chin tucks nv 1x5/3" ea   5x3"     Shoulder rolls 20x 20x   20x     scap retraction 20x 20x   20x     MTP/LTP nv         Serratus punch  *1x3/3"        Prone row          Prone I's           Prone T's (thumbs up and down)        Standing YTI          Lat pull down          Ulnar nerve floss 10x  10x     Seated thoracic extension nv 1x5/5" nv                                 Modalities 1/17 1 20 20 1/23     MHP nv Pre-ex  Cerv/B UT  10 min With pre mod to bilateral upper thoracic seated with MHP to cervical and thoracic x10 min

## 2020-01-23 ENCOUNTER — TELEPHONE (OUTPATIENT)
Dept: PAIN MEDICINE | Facility: CLINIC | Age: 50
End: 2020-01-23

## 2020-01-23 ENCOUNTER — OFFICE VISIT (OUTPATIENT)
Dept: PHYSICAL THERAPY | Facility: CLINIC | Age: 50
End: 2020-01-23
Payer: COMMERCIAL

## 2020-01-23 DIAGNOSIS — R29.3 ABNORMAL POSTURE: ICD-10-CM

## 2020-01-23 DIAGNOSIS — M54.12 RADICULOPATHY, CERVICAL: Primary | ICD-10-CM

## 2020-01-23 DIAGNOSIS — M48.02 CERVICAL SPINAL STENOSIS: ICD-10-CM

## 2020-01-23 PROCEDURE — 97140 MANUAL THERAPY 1/> REGIONS: CPT | Performed by: PHYSICAL THERAPIST

## 2020-01-23 PROCEDURE — 97110 THERAPEUTIC EXERCISES: CPT | Performed by: PHYSICAL THERAPIST

## 2020-01-23 PROCEDURE — 97032 APPL MODALITY 1+ESTIM EA 15: CPT | Performed by: PHYSICAL THERAPIST

## 2020-01-23 NOTE — TELEPHONE ENCOUNTER
Patient called requesting to speak to a nurse about her chronic body pain  She states she is experiencing cervical, back, spine, right arm & b/l shoulder blade pain  Her pain is progressively getting worse  She was at PT this morning & feels worse   Please advise, sanjay    Call back# [phone]

## 2020-01-23 NOTE — TELEPHONE ENCOUNTER
Pt said she is in PT and went this AM and she is having spasms and electric shocks of her shoulders and arm, her pain is getting progressively worse  Pt advised that she would need an ov again to be re-evaluated as she was only seen once in our office back on 4/1/19  Pt advised to reach out to her PCP or go to Urgent Care for other rec until able to be seen here  First avail ov with our office with FQ or NM is on 2/24/20 w/ FQ at 8:15  Pt appreciated the appt

## 2020-01-27 ENCOUNTER — APPOINTMENT (OUTPATIENT)
Dept: PHYSICAL THERAPY | Facility: CLINIC | Age: 50
End: 2020-01-27
Payer: COMMERCIAL

## 2020-01-27 DIAGNOSIS — M54.12 RADICULOPATHY, CERVICAL: ICD-10-CM

## 2020-01-27 RX ORDER — GABAPENTIN 300 MG/1
300 CAPSULE ORAL 3 TIMES DAILY
Qty: 90 CAPSULE | Refills: 0 | Status: SHIPPED | OUTPATIENT
Start: 2020-01-27 | End: 2020-03-12 | Stop reason: HOSPADM

## 2020-01-27 NOTE — TELEPHONE ENCOUNTER
Pt reports she was able to get into S/P 2/24 but questions if this office can get her in any sooner  Per a note 1/21, this practice did already reach out for appt and since appt  was provided  She also requests refill of Gabapentin which will be provided to get her to her pain appt   Pt notified

## 2020-01-28 ENCOUNTER — TELEPHONE (OUTPATIENT)
Dept: NEUROSURGERY | Facility: CLINIC | Age: 50
End: 2020-01-28

## 2020-01-28 ENCOUNTER — EVALUATION (OUTPATIENT)
Dept: PHYSICAL THERAPY | Facility: CLINIC | Age: 50
End: 2020-01-28
Payer: COMMERCIAL

## 2020-01-28 DIAGNOSIS — M54.50 CHRONIC LOW BACK PAIN, UNSPECIFIED BACK PAIN LATERALITY, UNSPECIFIED WHETHER SCIATICA PRESENT: ICD-10-CM

## 2020-01-28 DIAGNOSIS — G89.29 CHRONIC LOW BACK PAIN, UNSPECIFIED BACK PAIN LATERALITY, UNSPECIFIED WHETHER SCIATICA PRESENT: ICD-10-CM

## 2020-01-28 DIAGNOSIS — R29.3 ABNORMAL POSTURE: ICD-10-CM

## 2020-01-28 DIAGNOSIS — R26.2 DIFFICULTY WALKING: ICD-10-CM

## 2020-01-28 DIAGNOSIS — M48.02 CERVICAL SPINAL STENOSIS: ICD-10-CM

## 2020-01-28 DIAGNOSIS — M54.12 RADICULOPATHY, CERVICAL: Primary | ICD-10-CM

## 2020-01-28 PROCEDURE — 97164 PT RE-EVAL EST PLAN CARE: CPT | Performed by: PHYSICAL THERAPIST

## 2020-01-28 PROCEDURE — 97110 THERAPEUTIC EXERCISES: CPT | Performed by: PHYSICAL THERAPIST

## 2020-01-28 PROCEDURE — 97032 APPL MODALITY 1+ESTIM EA 15: CPT | Performed by: PHYSICAL THERAPIST

## 2020-01-28 NOTE — TELEPHONE ENCOUNTER
Called pt regarding messaged mentioned below  Informed pt per [facility] LOV note she may f/u with us as needed, but we have not placed her out of work  Any paperwork needed as far as office visit notes I would gladly fax over to the insurance company if they request it, but we would no longer be managing any further request for disability since her surgery was approx 8 months ago  I did inform the pt that she should try reaching out to her PCP or PM for additional time off work  Pt understood  Gave pt fax and phone number if it is required for future purposes  ----- Message from Teodoro Seo RN sent at 1/28/2020  8:32 AM EST -----  Regarding: FW: Visit Follow-Up Question  Contact: [phone]  See patient's message below  ----- Message -----  From: Yandel Moulton  Sent: 1/28/2020   5:05 AM EST  To: [facility]  Subject: Visit Follow-Up Question                         My company's insurance company has filed for short term disability for me  They are asking me to follow up with Dr Alysa Saunders to make sure that my medical information has been submitted to them  It has been 2 weeks since my injury and my claim is still pending so I am following up for that reason  Thank you

## 2020-01-30 ENCOUNTER — TRANSCRIBE ORDERS (OUTPATIENT)
Dept: ADMINISTRATIVE | Facility: HOSPITAL | Age: 50
End: 2020-01-30

## 2020-01-30 DIAGNOSIS — M96.1 POSTLAMINECTOMY SYNDROME, CERVICAL REGION: ICD-10-CM

## 2020-01-30 DIAGNOSIS — M50.10 CERVICAL DISC SYNDROME: Primary | ICD-10-CM

## 2020-01-30 NOTE — PROGRESS NOTES
PT Re-Evaluation     Today's date: 2020  Patient name: Barbie Gustafson  : 1970  MRN: [id number]  Referring provider: Erika Iraheta MD  Dx:   Encounter Diagnosis     ICD-10-CM    1  Radiculopathy, cervical M54 12    2  Cervical spinal stenosis M48 02    3  Abnormal posture R29 3    4  Chronic low back pain, unspecified back pain laterality, unspecified whether sciatica present M54 5     G89 29    5  Difficulty walking R26 2        Start Time: 1100  Stop Time: 1200  Total time in clinic (min): 60 minutes    Assessment  Assessment details: Barbie Gustafson has been seen in outpatient PT for 4 sessions with diagnosis of cervical pain  Today she was seen for a re-evaluation to include low back into treatment plan  Patient is a 54 y o  female with diagnosis of cervicalgia and past medical history significant for left ankle surgery, migraines, disease of thyroid gland, TMJ, cervical fusion (2019)  FOTO function score for lumbar region at 44% function with predicted function of 59%  Objective findings today show increase in right sided radicular symptoms with repetitive lumbar flexion indicating a lumbar extension bias to be focused on during treatment  Patient also displays poor activation of core with excessive activation of cervical and shoulder musculature during core activation contributing to cervical and lumbar dysfunction  Skilled PT is indicated at this time working on lumbar extension bias with work on trunk stability to decrease pain and improve standing, walking, and lifting tasks  Impairments: abnormal gait, abnormal muscle firing, abnormal muscle tone, abnormal or restricted ROM, activity intolerance, impaired balance, impaired physical strength, lacks appropriate home exercise program, pain with function, safety issue, poor posture  and poor body mechanics    Goals  STG (6 weeks)  1   Patient will have reported 3/10 pain in low back at rest    2  Improve patient's lumbar extension to 20 degrees for increased ability to take proper strides during ambulation  3  Increase patient's bilateral single leg balance to 3 seconds for increased stability on stairs  LTG (12 weeks)  1  Patient's LE strength will be equal bilaterally for ability to ambulate and return to functional activities at [facility]  2  Patient will be able to walk for 30 min with no increase in low back pain  3  Patient will be independent with home exercise program for continued maintenance post PT discharge  Plan  Plan details: Progress note in 4 weeks  Patient would benefit from: skilled physical therapy  Planned modality interventions: TENS, manual electrical stimulation, unattended electrical stimulation, cryotherapy and thermotherapy: hydrocollator packs  Planned therapy interventions: manual therapy, neuromuscular re-education, therapeutic activities, therapeutic exercise and home exercise program  Frequency: 2x week  Plan of Care beginning date: 2020  Plan of Care expiration date: 2020  Treatment plan discussed with: patient and PTA        Subjective Evaluation    History of Present Illness  Date of onset: 2019  Mechanism of injury: Patient presents to PT session today with script to include low back symptoms into treatment program  States she has had chronic low back pain which increased over the past year, but worsened approximately 6 months ago of insidious onset  No lumbar surgical history  2018 lumbar x-ray findings: There is a right concave scoliotic curve to the lumbar spine  There is anterior osteophytic spurring at T12-L1 through L2-3  There are degenerative changes of the facets at L2-3 and L3-4  There is no fracture or dislocation     Pain  Current pain ratin  At best pain ratin  At worst pain rating: 10  Location: across low back, R>L  Quality: knife-like and sharp  Relieving factors: heat  Aggravating factors: walking, standing and lifting  Progression: worsening      Diagnostic Tests  X-ray: abnormal  Patient Goals  Patient goals for therapy: decreased pain  Patient goal: return to gym and work without pain        Objective     Concurrent Complaints  Positive for bladder dysfunction (recent diagnosis of chrones she thinks is the cause for increased urination  )  Negative for bowel dysfunction and saddle (S4) numbness    Active Range of Motion     Lumbar   Flexion: Active lumbar flexion: fingertips to distal shin  Extension: 16 degrees   Left lateral flexion: 12 degrees       Right lateral flexion: 11 degrees   Mechanical Assessment    Cervical      Thoracic      Lumbar    Standing flexion: repeated movements   Pain location:peripheralized  Pain intensity: worse  Pain level: increased  Standing extension: repeated movements  Pain location: no change  Pain intensity: worse  Pain level: increased    Strength/Myotome Testing     Left Hip   Planes of Motion   Flexion: 4-    Right Hip   Planes of Motion   Flexion: 4+    Left Knee   Flexion: 4+  Extension: 4-    Right Knee   Flexion: 4+  Extension: 4    Left Ankle/Foot   Plantar flexion: 5    Right Ankle/Foot   Plantar flexion: 5    Muscle Activation     Additional Muscle Activation Details  TrA activation with resisted SLR R=mod L=min    Tests     Lumbar     Left   Negative passive SLR  Right   Negative passive SLR       Additional Tests Details  No relief with manual lumbar traction    General Comments:      Lumbar Comments      FOTO Lumbar: 44% function, 59% predicted function              Precautions: cervical fusion 4/2019       Re-evaluation cervical: 2/17 Lumbar: 2/28  POC:4/17    Cervical flowsheet    Manual  1/17 1 20 20 1/23 1/30    UT stretch  *Reviewed for self stretch  B 3x/30"   Right UT trigger point release, 2 lesions x 1 min each     LS stretch  *Reviewed for self stretch   3x/30"      SOR          Cervical distraction Pain; DC _ _      1st rib mob Grade III-IV       T/S PA mobs        C/S PA and side glide mobs Scapular PNF        IASTM   Graston to bilateral superior shoulder and upper thoracic region using GT 3,4,5 in sitting     Massage x 15 min  **L scap  **R c-spine para spinals  **R UT              1/23/20: Reviewed Graston Technique with patient, questionnaire was signed         Exercise Diary  1/17 1 20 20 1/23 1/30    UBE          UT stretch 3x30" 3x30"   nv     LS stretch 3x30" 3x30"   nv     Doorway stretch        C/S AROM nv 1x5/3" ea   5x ea 5x ea    Chin tucks nv 1x5/3" ea   5x3" 3"x5    Shoulder rolls 20x 20x   20x 20    scap retraction 20x 20x   20x 20    MTP/LTP nv         Serratus punch  *1x3/3"        Prone row          Prone I's           Prone T's (thumbs up and down)        Standing YTI          Lat pull down          Ulnar nerve floss 10x  10x 10x bilat    Seated thoracic extension nv 1x5/5" nv nv            Standing lumbar extension    10x    Core brace    5"x10        Modalities 1/17 1 20 20 1/23 1/30    MHP nv Pre-ex  Cerv/B UT  10 min With pre mod to bilateral upper thoracic seated with MHP to cervical and thoracic x10 min With pre mod to bilateral upper thoracic seated with MHP to cervical and thoracic x10 min

## 2020-02-04 ENCOUNTER — HOSPITAL ENCOUNTER (EMERGENCY)
Facility: HOSPITAL | Age: 50
Discharge: HOME | End: 2020-02-04
Attending: EMERGENCY MEDICINE | Admitting: EMERGENCY MEDICINE
Payer: COMMERCIAL

## 2020-02-04 ENCOUNTER — APPOINTMENT (EMERGENCY)
Dept: NON INVASIVE DIAGNOSTICS | Facility: HOSPITAL | Age: 50
End: 2020-02-04
Payer: COMMERCIAL

## 2020-02-04 ENCOUNTER — APPOINTMENT (EMERGENCY)
Dept: RADIOLOGY | Facility: HOSPITAL | Age: 50
End: 2020-02-04
Payer: COMMERCIAL

## 2020-02-04 ENCOUNTER — OFFICE VISIT (OUTPATIENT)
Dept: URGENT CARE | Facility: CLINIC | Age: 50
End: 2020-02-04
Payer: COMMERCIAL

## 2020-02-04 ENCOUNTER — APPOINTMENT (EMERGENCY)
Dept: CT IMAGING | Facility: HOSPITAL | Age: 50
End: 2020-02-04
Payer: COMMERCIAL

## 2020-02-04 VITALS
WEIGHT: 290 LBS | SYSTOLIC BLOOD PRESSURE: 122 MMHG | HEART RATE: 62 BPM | OXYGEN SATURATION: 97 % | RESPIRATION RATE: 18 BRPM | DIASTOLIC BLOOD PRESSURE: 70 MMHG | BODY MASS INDEX: 41.52 KG/M2 | HEIGHT: 70 IN | TEMPERATURE: 97.8 F

## 2020-02-04 VITALS
TEMPERATURE: 97.5 F | SYSTOLIC BLOOD PRESSURE: 114 MMHG | WEIGHT: 290 LBS | OXYGEN SATURATION: 99 % | RESPIRATION RATE: 18 BRPM | DIASTOLIC BLOOD PRESSURE: 57 MMHG | BODY MASS INDEX: 41.61 KG/M2 | HEART RATE: 65 BPM

## 2020-02-04 DIAGNOSIS — R60.0 BILATERAL LOWER EXTREMITY EDEMA: Primary | ICD-10-CM

## 2020-02-04 DIAGNOSIS — R60.0 EXTREMITY EDEMA: ICD-10-CM

## 2020-02-04 DIAGNOSIS — R06.02 SOB (SHORTNESS OF BREATH): Primary | ICD-10-CM

## 2020-02-04 LAB
ALBUMIN SERPL BCP-MCNC: 4.6 G/DL (ref 3.5–5.7)
ALP SERPL-CCNC: 50 U/L (ref 40–150)
ALT SERPL W P-5'-P-CCNC: 18 U/L (ref 7–52)
ANION GAP SERPL CALCULATED.3IONS-SCNC: 6 MMOL/L (ref 4–13)
AST SERPL W P-5'-P-CCNC: 13 U/L (ref 13–39)
ATRIAL RATE: 57 BPM
ATRIAL RATE: 66 BPM
BASOPHILS # BLD AUTO: 0 THOUSANDS/ΜL (ref 0–0.1)
BASOPHILS NFR BLD AUTO: 1 % (ref 0–2)
BILIRUB SERPL-MCNC: 0.5 MG/DL (ref 0.2–1)
BILIRUB UR QL STRIP: NEGATIVE
BNP SERPL-MCNC: 51 PG/ML (ref 1–100)
BUN SERPL-MCNC: 16 MG/DL (ref 7–25)
CALCIUM SERPL-MCNC: 9.4 MG/DL (ref 8.6–10.5)
CHLORIDE SERPL-SCNC: 106 MMOL/L (ref 98–107)
CLARITY UR: CLEAR
CO2 SERPL-SCNC: 25 MMOL/L (ref 21–31)
COLOR UR: YELLOW
CREAT SERPL-MCNC: 0.9 MG/DL (ref 0.6–1.2)
EOSINOPHIL # BLD AUTO: 0.3 THOUSAND/ΜL (ref 0–0.61)
EOSINOPHIL NFR BLD AUTO: 5 % (ref 0–5)
ERYTHROCYTE [DISTWIDTH] IN BLOOD BY AUTOMATED COUNT: 13.8 % (ref 11.5–14.5)
GFR SERPL CREATININE-BSD FRML MDRD: 76 ML/MIN/1.73SQ M
GLUCOSE SERPL-MCNC: 98 MG/DL (ref 65–99)
GLUCOSE UR STRIP-MCNC: NEGATIVE MG/DL
HCT VFR BLD AUTO: 40 % (ref 42–47)
HGB BLD-MCNC: 12.8 G/DL (ref 12–16)
HGB UR QL STRIP.AUTO: NEGATIVE
KETONES UR STRIP-MCNC: NEGATIVE MG/DL
LEUKOCYTE ESTERASE UR QL STRIP: NEGATIVE
LIPASE SERPL-CCNC: 33 U/L (ref 11–82)
LYMPHOCYTES # BLD AUTO: 2 THOUSANDS/ΜL (ref 0.6–4.47)
LYMPHOCYTES NFR BLD AUTO: 31 % (ref 21–51)
MCH RBC QN AUTO: 28.8 PG (ref 26–34)
MCHC RBC AUTO-ENTMCNC: 32 G/DL (ref 31–37)
MCV RBC AUTO: 90 FL (ref 81–99)
MONOCYTES # BLD AUTO: 0.3 THOUSAND/ΜL (ref 0.17–1.22)
MONOCYTES NFR BLD AUTO: 5 % (ref 2–12)
NEUTROPHILS # BLD AUTO: 3.8 THOUSANDS/ΜL (ref 1.4–6.5)
NEUTS SEG NFR BLD AUTO: 59 % (ref 42–75)
NITRITE UR QL STRIP: NEGATIVE
P AXIS: 106 DEGREES
P AXIS: 31 DEGREES
PH UR STRIP.AUTO: 5.5 [PH]
PLATELET # BLD AUTO: 262 THOUSANDS/UL (ref 149–390)
PMV BLD AUTO: 7 FL (ref 8.6–11.7)
POTASSIUM SERPL-SCNC: 3.8 MMOL/L (ref 3.5–5.5)
PR INTERVAL: 166 MS
PR INTERVAL: 170 MS
PROT SERPL-MCNC: 7.1 G/DL (ref 6.4–8.9)
PROT UR STRIP-MCNC: NEGATIVE MG/DL
QRS AXIS: 173 DEGREES
QRS AXIS: 25 DEGREES
QRSD INTERVAL: 90 MS
QRSD INTERVAL: 96 MS
QT INTERVAL: 448 MS
QT INTERVAL: 460 MS
QTC INTERVAL: 447 MS
QTC INTERVAL: 469 MS
RBC # BLD AUTO: 4.45 MILLION/UL (ref 3.9–5.2)
SODIUM SERPL-SCNC: 137 MMOL/L (ref 134–143)
SP GR UR STRIP.AUTO: 1.01 (ref 1–1.03)
T WAVE AXIS: 115 DEGREES
T WAVE AXIS: 53 DEGREES
TROPONIN I SERPL-MCNC: <0.03 NG/ML
TSH SERPL DL<=0.05 MIU/L-ACNC: 2.57 UIU/ML (ref 0.45–5.33)
UROBILINOGEN UR QL STRIP.AUTO: 0.2 E.U./DL
VENTRICULAR RATE: 57 BPM
VENTRICULAR RATE: 66 BPM
WBC # BLD AUTO: 6.4 THOUSAND/UL (ref 4.8–10.8)

## 2020-02-04 PROCEDURE — 81003 URINALYSIS AUTO W/O SCOPE: CPT | Performed by: EMERGENCY MEDICINE

## 2020-02-04 PROCEDURE — 36415 COLL VENOUS BLD VENIPUNCTURE: CPT

## 2020-02-04 PROCEDURE — 93010 ELECTROCARDIOGRAM REPORT: CPT | Performed by: INTERNAL MEDICINE

## 2020-02-04 PROCEDURE — 84484 ASSAY OF TROPONIN QUANT: CPT

## 2020-02-04 PROCEDURE — 83690 ASSAY OF LIPASE: CPT | Performed by: EMERGENCY MEDICINE

## 2020-02-04 PROCEDURE — 71275 CT ANGIOGRAPHY CHEST: CPT

## 2020-02-04 PROCEDURE — 93970 EXTREMITY STUDY: CPT | Performed by: SURGERY

## 2020-02-04 PROCEDURE — 84443 ASSAY THYROID STIM HORMONE: CPT | Performed by: EMERGENCY MEDICINE

## 2020-02-04 PROCEDURE — 85025 COMPLETE CBC W/AUTO DIFF WBC: CPT

## 2020-02-04 PROCEDURE — 74177 CT ABD & PELVIS W/CONTRAST: CPT

## 2020-02-04 PROCEDURE — 96374 THER/PROPH/DIAG INJ IV PUSH: CPT

## 2020-02-04 PROCEDURE — 83880 ASSAY OF NATRIURETIC PEPTIDE: CPT | Performed by: EMERGENCY MEDICINE

## 2020-02-04 PROCEDURE — 93005 ELECTROCARDIOGRAM TRACING: CPT | Performed by: NURSE PRACTITIONER

## 2020-02-04 PROCEDURE — 99285 EMERGENCY DEPT VISIT HI MDM: CPT

## 2020-02-04 PROCEDURE — 93005 ELECTROCARDIOGRAM TRACING: CPT

## 2020-02-04 PROCEDURE — 93970 EXTREMITY STUDY: CPT

## 2020-02-04 PROCEDURE — 99284 EMERGENCY DEPT VISIT MOD MDM: CPT | Performed by: EMERGENCY MEDICINE

## 2020-02-04 PROCEDURE — 80053 COMPREHEN METABOLIC PANEL: CPT

## 2020-02-04 RX ORDER — TRAMADOL HYDROCHLORIDE 50 MG/1
50 TABLET ORAL 2 TIMES DAILY PRN
COMMUNITY
End: 2020-03-15

## 2020-02-04 RX ORDER — FUROSEMIDE 10 MG/ML
40 INJECTION INTRAMUSCULAR; INTRAVENOUS ONCE
Status: COMPLETED | OUTPATIENT
Start: 2020-02-04 | End: 2020-02-04

## 2020-02-04 RX ORDER — FUROSEMIDE 40 MG/1
40 TABLET ORAL DAILY
Qty: 7 TABLET | Refills: 0 | Status: SHIPPED | OUTPATIENT
Start: 2020-02-04 | End: 2020-03-12 | Stop reason: HOSPADM

## 2020-02-04 RX ORDER — POTASSIUM CHLORIDE 750 MG/1
10 TABLET, EXTENDED RELEASE ORAL 2 TIMES DAILY
Qty: 14 TABLET | Refills: 0 | Status: SHIPPED | OUTPATIENT
Start: 2020-02-04 | End: 2020-03-12 | Stop reason: HOSPADM

## 2020-02-04 RX ORDER — OSELTAMIVIR PHOSPHATE 75 MG/1
75 CAPSULE ORAL DAILY
COMMUNITY
End: 2020-03-15

## 2020-02-04 RX ADMIN — IOHEXOL 100 ML: 350 INJECTION, SOLUTION INTRAVENOUS at 15:36

## 2020-02-04 RX ADMIN — FUROSEMIDE 40 MG: 10 INJECTION, SOLUTION INTRAVENOUS at 17:56

## 2020-02-04 NOTE — ED PROVIDER NOTES
History  Chief Complaint   Patient presents with    Shortness of Breath    Leg Swelling     Presents from urgent care for evaluation of edema  It has been going on for about a week  She has no history of congestive heart failure  No history of renal or hepatic disease  No chest pain  She reports that she has gained weight  Her legs are swollen  She feels that her abdomen is swollen  She has put on 10 lb  Left leg is swollen greater than the right  She does have a history of a DVT to that left leg back when she had ankle surgery  She has been short of breath  No cough  No fever or chills  Symptoms are moderately severe without relieving factors  Prior to Admission Medications   Prescriptions Last Dose Informant Patient Reported? Taking?    Cyanocobalamin (VITAMIN B 12 PO)  Self Yes No   Sig: Take by mouth   amLODIPine (NORVASC) 5 mg tablet   Yes No   Sig: Take 5 mg by mouth as needed    cholecalciferol (VITAMIN D3) 1,000 units tablet   Yes No   Sig: Take 2,000 Units by mouth daily   ferrous sulfate (IRON SUPPLEMENT) 325 (65 Fe) mg tablet   Yes No   Sig: Take 325 mg by mouth daily with breakfast   fluticasone (FLONASE) 50 mcg/act nasal spray   No No   Si spray into each nostril daily   Patient not taking: Reported on 2020   gabapentin (NEURONTIN) 300 mg capsule   No No   Sig: Take 1 capsule (300 mg total) by mouth 3 (three) times a day   levothyroxine 100 mcg tablet   Yes No   Sig: Take 100 mcg by mouth daily   omeprazole (PriLOSEC) 20 mg delayed release capsule   Yes No   Sig: Take 1 capsule by mouth daily    oseltamivir (TAMIFLU) 75 mg capsule   Yes No   Sig: Take 75 mg by mouth daily   pramipexole (MIRAPEX) 0 5 mg tablet   Yes No   Sig: Take 0 5 mg by mouth 2 (two) times a day     topiramate (TOPAMAX) 25 mg tablet   Yes No   Sig: Take 25 mg by mouth daily at bedtime    topiramate (TOPAMAX) 50 MG tablet   Yes No   traMADol (ULTRAM) 50 mg tablet   Yes No   Sig: Take 50 mg by mouth 2 (two) times a day as needed for moderate pain   urea (CARMOL) 40 % ointment   Yes No   Sig: Apply topically      Facility-Administered Medications: None       Past Medical History:   Diagnosis Date    Bulging of cervical intervertebral disc     Disease of thyroid gland     Migraines     Neck pain, chronic     Polycystic ovarian syndrome     PONV (postoperative nausea and vomiting)     TMJ (temporomandibular joint syndrome)        Past Surgical History:   Procedure Laterality Date    ADENOIDECTOMY      ANKLE SURGERY Left     reconstruction     SECTION      x2    DIAGNOSTIC LAPAROSCOPY      HYSTERECTOMY      MT ARTHRODESIS ANT INTERBODY INC DISCECTOMY, CERVICAL BELOW C2 Bilateral 2019    Procedure: C5/6 ANTERIOR CERVICAL DECOMPRESSION AND FUSION;  Surgeon: Dannie Ram MD;  Location: [facility];  Service: Neurosurgery    TONSILLECTOMY      TUBAL LIGATION         History reviewed  No pertinent family history  I have reviewed and agree with the history as documented  Social History     Tobacco Use    Smoking status: Current Every Day Smoker     Packs/day: 1 00     Types: Cigarettes    Smokeless tobacco: Never Used   Substance Use Topics    Alcohol use: No    Drug use: Never        Review of Systems   Constitutional: Positive for fatigue and unexpected weight change  Negative for chills and fever  HENT: Negative for rhinorrhea and sore throat  Eyes: Negative for pain, redness and visual disturbance  Respiratory: Positive for shortness of breath  Negative for cough  Cardiovascular: Positive for leg swelling  Negative for chest pain  Gastrointestinal: Negative for abdominal pain, diarrhea and vomiting  Endocrine: Negative for polydipsia and polyuria  Genitourinary: Negative for dysuria, frequency, hematuria, vaginal bleeding and vaginal discharge  Musculoskeletal: Negative for back pain and neck pain  Skin: Negative for rash and wound     Allergic/Immunologic: Negative for immunocompromised state  Neurological: Negative for weakness, numbness and headaches  Hematological: Does not bruise/bleed easily  Psychiatric/Behavioral: Negative for hallucinations and suicidal ideas  All other systems reviewed and are negative  Physical Exam  Physical Exam   Constitutional: She is oriented to person, place, and time  She appears well-developed and well-nourished  No distress  HENT:   Head: Normocephalic and atraumatic  Mouth/Throat: Oropharynx is clear and moist    Eyes: Conjunctivae are normal  Right eye exhibits no discharge  Left eye exhibits no discharge  No scleral icterus  Neck: Normal range of motion  Neck supple  Cardiovascular: Normal rate, regular rhythm, normal heart sounds and intact distal pulses  Exam reveals no gallop and no friction rub  No murmur heard  Pulmonary/Chest: Effort normal and breath sounds normal  No stridor  No respiratory distress  She has no wheezes  She has no rales  Abdominal: Soft  Bowel sounds are normal  She exhibits no distension  There is no tenderness  There is no rebound and no guarding  Musculoskeletal: Normal range of motion  She exhibits no tenderness or deformity  Right lower leg: She exhibits edema  She exhibits no tenderness  Left lower leg: She exhibits edema  She exhibits no tenderness  No CVA tenderness  No calf tenderness/palpable cords  Neurological: She is alert and oriented to person, place, and time  She has normal strength  No sensory deficit  GCS eye subscore is 4  GCS verbal subscore is 5  GCS motor subscore is 6  Skin: Skin is warm and dry  No rash noted  Psychiatric: She has a normal mood and affect  Her behavior is normal    Vitals reviewed        Vital Signs  ED Triage Vitals [02/04/20 1410]   Temperature Pulse Respirations Blood Pressure SpO2   97 5 °F (36 4 °C) 68 18 137/63 100 %      Temp Source Heart Rate Source Patient Position - Orthostatic VS BP Location FiO2 (%)   Temporal Monitor Lying Left arm --      Pain Score       3           Vitals:    02/04/20 1410 02/04/20 1445   BP: 137/63 114/56   Pulse: 68 71   Patient Position - Orthostatic VS: Lying Lying         Visual Acuity      ED Medications  Medications - No data to display    Diagnostic Studies  Results Reviewed     Procedure Component Value Units Date/Time    TSH [[id number]]  (Normal) Collected:  02/04/20 1427    Lab Status:  Final result Specimen:  Blood from Arm, Right Updated:  02/04/20 1526     TSH 3RD GENERATON 2 570 uIU/mL     Narrative:       Patients undergoing fluorescein dye angiography may retain small amounts of fluorescein in the body for 48-72 hours post procedure  Samples containing fluorescein can produce falsely depressed TSH values  If the patient had this procedure,a specimen should be resubmitted post fluorescein clearance        Lipase [[id number]]  (Normal) Collected:  02/04/20 1427    Lab Status:  Final result Specimen:  Blood from Arm, Right Updated:  02/04/20 1515     Lipase 33 u/L     B-Type Natriuretic Peptide ([address] [place] [address]) [[id number]]  (Normal) Collected:  02/04/20 1435    Lab Status:  Final result Specimen:  Blood from Arm, Right Updated:  02/04/20 1508     BNP 51 pg/mL     UA (URINE) with reflex to Scope [[id number]]  (Normal) Collected:  02/04/20 1459    Lab Status:  Final result Specimen:  Urine, Clean Catch Updated:  02/04/20 1508     Color, UA Yellow     Clarity, UA Clear     Specific Gravity, UA 1 010     pH, UA 5 5     Leukocytes, UA Negative     Nitrite, UA Negative     Protein, UA Negative mg/dl      Glucose, UA Negative mg/dl      Ketones, UA Negative mg/dl      Urobilinogen, UA 0 2 E U /dl      Bilirubin, UA Negative     Blood, UA Negative    Troponin I [[id number]]  (Normal) Collected:  02/04/20 1427    Lab Status:  Final result Specimen:  Blood from Arm, Right Updated:  02/04/20 1457     Troponin I <0 03 ng/mL     Comprehensive metabolic panel [[id number]] Collected:  02/04/20 1427    Lab Status:  Final result Specimen:  Blood from Arm, Right Updated:  02/04/20 1457     Sodium 137 mmol/L      Potassium 3 8 mmol/L      Chloride 106 mmol/L      CO2 25 mmol/L      ANION GAP 6 mmol/L      BUN 16 mg/dL      Creatinine 0 90 mg/dL      Glucose 98 mg/dL      Calcium 9 4 mg/dL      AST 13 U/L      ALT 18 U/L      Alkaline Phosphatase 50 U/L      Total Protein 7 1 g/dL      Albumin 4 6 g/dL      Total Bilirubin 0 50 mg/dL      eGFR 76 ml/min/1 73sq m     Narrative:       National Kidney Disease Foundation guidelines for Chronic Kidney Disease (CKD):     Stage 1 with normal or high GFR (GFR > 90 mL/min/1 73 square meters)    Stage 2 Mild CKD (GFR = 60-89 mL/min/1 73 square meters)    Stage 3A Moderate CKD (GFR = 45-59 mL/min/1 73 square meters)    Stage 3B Moderate CKD (GFR = 30-44 mL/min/1 73 square meters)    Stage 4 Severe CKD (GFR = 15-29 mL/min/1 73 square meters)    Stage 5 End Stage CKD (GFR <15 mL/min/1 73 square meters)  Note: GFR calculation is accurate only with a steady state creatinine    CBC and differential [[id number]]  (Abnormal) Collected:  02/04/20 1427    Lab Status:  Final result Specimen:  Blood from Arm, Right Updated:  02/04/20 1435     WBC 6 40 Thousand/uL      RBC 4 45 Million/uL      Hemoglobin 12 8 g/dL      Hematocrit 40 0 %      MCV 90 fL      MCH 28 8 pg      MCHC 32 0 g/dL      RDW 13 8 %      MPV 7 0 fL      Platelets 473 Thousands/uL      Neutrophils Relative 59 %      Lymphocytes Relative 31 %      Monocytes Relative 5 %      Eosinophils Relative 5 %      Basophils Relative 1 %      Neutrophils Absolute 3 80 Thousands/µL      Lymphocytes Absolute 2 00 Thousands/µL      Monocytes Absolute 0 30 Thousand/µL      Eosinophils Absolute 0 30 Thousand/µL      Basophils Absolute 0 00 Thousands/µL     NT-BNP PRO [[id number]] Collected:  02/04/20 1427    Lab Status:   In process Specimen:  Blood from Arm, Right Updated:  02/04/20 1431                 VAS lower limb venous duplex study, complete bilateral    (Results Pending)   PE Study with CT Abdomen and Pelvis with contrast    (Results Pending)              Procedures  ECG 12 Lead Documentation Only  Date/Time: 2/4/2020 3:32 PM  Performed by: Kenney Joy MD  Authorized by: Kenney Joy MD     Comments:      Normal sinus rhythm  Right axis deviation  No acute ischemic ST or T-wave changes  No LVH  Nonspecific EKG  ED Course                               MDM  Number of Diagnoses or Management Options  Diagnosis management comments: Chest CT is without pulmonary edema  BNP is normal   EKG is without arrhythmia or acute ischemia  CT scan is negative for pulmonary embolism  Ultrasound is negative for DVT  There is no proteinuria to suggest nephrotic syndrome  No cirrhosis  No pneumonia on chest x-ray  Appropriate for discharge and outpatient management  Amount and/or Complexity of Data Reviewed  Clinical lab tests: ordered and reviewed  Tests in the radiology section of CPT®: ordered and reviewed  Independent visualization of images, tracings, or specimens: yes          Disposition  Final diagnoses:   None     ED Disposition     None      Follow-up Information    None         Patient's Medications   Discharge Prescriptions    No medications on file     No discharge procedures on file      ED Provider  Electronically Signed by           Kenney Joy MD  02/06/20 0535

## 2020-02-04 NOTE — PROGRESS NOTES
[facility]        NAME: Vazquez De La Rosa is a 52 y o  female  : 1970    MRN: [id number]  DATE: 2020  TIME: 5:11 PM    Assessment and Plan   SOB (shortness of breath) [R06 02]  1  SOB (shortness of breath)  Transfer to other facility    ECG 12 lead   2  Extremity edema  Transfer to other facility    ECG 12 lead         Patient Instructions     Proceed to  ER for further evaluation  Patient's  to drive patient to ER  Patient agreeable to plan of care  Chief Complaint     Chief Complaint   Patient presents with    Edema     edema in B/L legs , abdomen, and hands started this morning  also sob on exertion  and c/o of tripping  when walking  History of Present Illness       Patient is a 73-year-old female who presents with a one-week history of swelling in her bilateral legs and hands  Patient also complaining of 10 lb weight gain in 1 week and shortness of breath with exertion  Patient denies any history of CHF  Patient denies any chest pain or palpitations  Patient denies any recent changes in her diet  Patient denies any calf pain  Patient does smoke 1 pack per day  Patient denies any fever, chills, body aches  Denies any URI symptoms  Denies any headache, dizziness, or feeling lightheaded  Denies cough  Denies any abdominal pain, nausea, vomiting or diarrhea  Review of Systems   Review of Systems   Constitutional: Positive for unexpected weight change  Negative for chills, diaphoresis, fatigue and fever  HENT: Negative for congestion, ear pain, facial swelling, mouth sores, postnasal drip, rhinorrhea, sinus pain, sore throat and trouble swallowing  Eyes: Negative for photophobia, pain, discharge, redness, itching and visual disturbance  Respiratory: Positive for shortness of breath  Negative for cough and chest tightness  Cardiovascular: Positive for leg swelling  Negative for chest pain and palpitations     Gastrointestinal: Negative for abdominal pain, diarrhea, nausea and vomiting  Genitourinary: Negative  Musculoskeletal: Negative for arthralgias, back pain, joint swelling, myalgias, neck pain and neck stiffness  Neurological: Negative for dizziness, weakness, light-headedness, numbness and headaches  Current Medications     No current facility-administered medications for this visit       Current Outpatient Medications:     cholecalciferol (VITAMIN D3) 1,000 units tablet, Take 2,000 Units by mouth daily, Disp: , Rfl:     Cyanocobalamin (VITAMIN B 12 PO), Take by mouth, Disp: , Rfl:     ferrous sulfate (IRON SUPPLEMENT) 325 (65 Fe) mg tablet, Take 325 mg by mouth daily with breakfast, Disp: , Rfl:     gabapentin (NEURONTIN) 300 mg capsule, Take 1 capsule (300 mg total) by mouth 3 (three) times a day, Disp: 90 capsule, Rfl: 0    levothyroxine 100 mcg tablet, Take 100 mcg by mouth daily, Disp: , Rfl:     omeprazole (PriLOSEC) 20 mg delayed release capsule, Take 1 capsule by mouth daily , Disp: , Rfl:     oseltamivir (TAMIFLU) 75 mg capsule, Take 75 mg by mouth daily, Disp: , Rfl:     pramipexole (MIRAPEX) 0 5 mg tablet, Take 0 5 mg by mouth 2 (two) times a day  , Disp: , Rfl:     topiramate (TOPAMAX) 25 mg tablet, Take 25 mg by mouth daily at bedtime , Disp: , Rfl:     traMADol (ULTRAM) 50 mg tablet, Take 50 mg by mouth 2 (two) times a day as needed for moderate pain, Disp: , Rfl:     amLODIPine (NORVASC) 5 mg tablet, Take 5 mg by mouth as needed , Disp: , Rfl:     fluticasone (FLONASE) 50 mcg/act nasal spray, 1 spray into each nostril daily (Patient not taking: Reported on 1/8/2020), Disp: 1 Bottle, Rfl: 0    topiramate (TOPAMAX) 50 MG tablet, , Disp: , Rfl:     urea (CARMOL) 40 % ointment, Apply topically, Disp: , Rfl:     Current Allergies     Allergies as of 02/04/2020 - Reviewed 02/04/2020   Allergen Reaction Noted    Amoxicillin Rash 05/05/2018    Codeine Rash and Other (See Comments) 05/05/2018    Diclofenac Rash 2019    Penicillins Other (See Comments) 2018    Sulfa antibiotics Other (See Comments) 2018    Diclofenac sodium Rash 2019    Etodolac Rash and Other (See Comments) 2018            The following portions of the patient's history were reviewed and updated as appropriate: allergies, current medications, past family history, past medical history, past social history, past surgical history and problem list      Past Medical History:   Diagnosis Date    Bulging of cervical intervertebral disc     Disease of thyroid gland     Migraines     Neck pain, chronic     Polycystic ovarian syndrome     PONV (postoperative nausea and vomiting)     TMJ (temporomandibular joint syndrome)        Past Surgical History:   Procedure Laterality Date    ADENOIDECTOMY      ANKLE SURGERY Left     reconstruction     SECTION      x2    DIAGNOSTIC LAPAROSCOPY      HYSTERECTOMY      MO ARTHRODESIS ANT INTERBODY INC DISCECTOMY, CERVICAL BELOW C2 Bilateral 2019    Procedure: C5/6 ANTERIOR CERVICAL DECOMPRESSION AND FUSION;  Surgeon: Brenad Cha MD;  Location: [facility];  Service: Neurosurgery    TONSILLECTOMY      TUBAL LIGATION         No family history on file  Medications have been verified  Objective   /70   Pulse 62   Temp 97 8 °F (36 6 °C)   Resp 18   Ht 5' 10" (1 778 m)   Wt 132 kg (290 lb)   SpO2 97%   BMI 41 61 kg/m²        Physical Exam     Physical Exam   Constitutional: She is oriented to person, place, and time  She appears well-developed and well-nourished  No distress  HENT:   Head: Normocephalic and atraumatic  Neck: No spinous process tenderness and no muscular tenderness present  Normal range of motion present  Cardiovascular: Normal rate, regular rhythm, S1 normal, S2 normal, normal heart sounds, intact distal pulses and normal pulses     +1 pitting edema to BLE   Pulmonary/Chest: Effort normal and breath sounds normal  Abdominal: Soft  Normal appearance and bowel sounds are normal  She exhibits distension  She exhibits no mass  There is no tenderness  There is no rigidity, no rebound, no guarding, no CVA tenderness, no tenderness at McBurney's point and negative Fine's sign  Neurological: She is alert and oriented to person, place, and time  She has normal strength  GCS eye subscore is 4  GCS verbal subscore is 5  GCS motor subscore is 6  Skin: Skin is warm and dry  Capillary refill takes less than 2 seconds  She is not diaphoretic

## 2020-02-05 ENCOUNTER — HOSPITAL ENCOUNTER (OUTPATIENT)
Dept: MRI IMAGING | Facility: HOSPITAL | Age: 50
Discharge: HOME | End: 2020-02-05
Payer: COMMERCIAL

## 2020-02-05 ENCOUNTER — OFFICE VISIT (OUTPATIENT)
Dept: PHYSICAL THERAPY | Facility: CLINIC | Age: 50
End: 2020-02-05
Payer: COMMERCIAL

## 2020-02-05 DIAGNOSIS — M54.12 RADICULOPATHY, CERVICAL: Primary | ICD-10-CM

## 2020-02-05 DIAGNOSIS — G89.29 CHRONIC LOW BACK PAIN, UNSPECIFIED BACK PAIN LATERALITY, UNSPECIFIED WHETHER SCIATICA PRESENT: ICD-10-CM

## 2020-02-05 DIAGNOSIS — M54.50 CHRONIC LOW BACK PAIN, UNSPECIFIED BACK PAIN LATERALITY, UNSPECIFIED WHETHER SCIATICA PRESENT: ICD-10-CM

## 2020-02-05 DIAGNOSIS — R29.3 ABNORMAL POSTURE: ICD-10-CM

## 2020-02-05 DIAGNOSIS — M96.1 POSTLAMINECTOMY SYNDROME, CERVICAL REGION: ICD-10-CM

## 2020-02-05 DIAGNOSIS — R26.2 DIFFICULTY WALKING: ICD-10-CM

## 2020-02-05 DIAGNOSIS — M50.10 CERVICAL DISC SYNDROME: ICD-10-CM

## 2020-02-05 DIAGNOSIS — M48.02 CERVICAL SPINAL STENOSIS: ICD-10-CM

## 2020-02-05 PROCEDURE — 72156 MRI NECK SPINE W/O & W/DYE: CPT

## 2020-02-05 PROCEDURE — 97110 THERAPEUTIC EXERCISES: CPT | Performed by: PHYSICAL THERAPIST

## 2020-02-05 PROCEDURE — A9585 GADOBUTROL INJECTION: HCPCS | Performed by: PHYSICAL MEDICINE & REHABILITATION

## 2020-02-05 PROCEDURE — 97140 MANUAL THERAPY 1/> REGIONS: CPT | Performed by: PHYSICAL THERAPIST

## 2020-02-05 RX ADMIN — GADOBUTROL 13 ML: 604.72 INJECTION INTRAVENOUS at 13:08

## 2020-02-05 NOTE — PROGRESS NOTES
Daily Note     Today's date: 2020  Patient name: Parveen Stone  : 1970  MRN: [id number]  Referring provider: Dianna Elizabeth MD  Dx:   Encounter Diagnosis     ICD-10-CM    1  Radiculopathy, cervical M54 12    2  Cervical spinal stenosis M48 02    3  Abnormal posture R29 3    4  Chronic low back pain, unspecified back pain laterality, unspecified whether sciatica present M54 5     G89 29    5  Difficulty walking R26 2        Start Time: 0900  Stop Time: 1000  Total time in clinic (min): 60 minutes    Subjective: Patient states right UE pain and tingling today  Did go to ED yesterday due to waking with excessive swelling in R>L lower extremity  Saw pain management since last session and was given tramadol  To have MRI of cervical spine later today and f/u with doctor next week to review findings  Not to return to work until re-evaluated  Objective: See treatment diary below      Assessment: Tolerated treatment well  Weakness of deep cervical flexors likely contributing to NEWT[place] symptoms  Crow Bryant was given printout of newer home program exercises to add  Also noted limitation of upper thoracic mobility causing compensation in cervical and lumbar spine  Patient would benefit from continued PT  Plan: Continue per plan of care  Progress treatment as tolerated         Precautions: cervical fusion 2019       Re-evaluation cervical:  Lumbar:   POC:    Cervical flowsheet    Manual   1  20  2/5   UT stretch  *Reviewed for self stretch  B 3x/30"   Right UT trigger point release, 2 lesions x 1 min each     LS stretch  *Reviewed for self stretch   3x/30"      SOR          Cervical distraction Pain; DC _ _      1st rib mob Grade III-IV       T/S PA mobs        C/S PA and side glide mobs        Scapular PNF        IASTM   Graston to bilateral superior shoulder and upper thoracic region using GT 3,4,5 in sitting  Graston to bilateral superior shoulder and upper thoracic region using GT 3,4,5 in sitting   Massage x 15 min  **L scap  **R c-spine para spinals  **R UT              1/23/20: Reviewed Graston Technique with patient, questionnaire was signed         Exercise Diary  1/17 1 20 20 1/23 1/30 2/5   Nustep       S=14 L1 with MH   UT stretch 3x30" 3x30"   nv     LS stretch 3x30" 3x30"   nv     Doorway stretch        C/S AROM nv 1x5/3" ea   5x ea 5x ea 5x ea   Chin tucks nv 1x5/3" ea   5x3" 3"x5 3"x5   Shoulder rolls 20x 20x   20x 20 20x   scap retraction 20x 20x   20x 20 20x   Supine chin tuck/head lift     5x5" chin tucks; 5"x5 lifts           MTP/LTP nv         Serratus punch  *1x3/3"        Prone row          Prone I's           Prone T's (thumbs up and down)        Standing YTI          Lat pull down          Ulnar nerve floss 10x  10x 10x bilat 10x bilat   Seated thoracic extension nv 1x5/5" nv nv 10x with overpressure using roll   LTR (small movements)     15x   Supine ball squeeze     15x   Supine clamshell     Green 15x   Standing lumbar extension    10x 10x   Core brace    5"x10 5"x5 with UE pressure  5"x5 without UE pressure       Modalities 1/17 1 20 20 1/23 1/30 2/5   MHP nv Pre-ex  Cerv/B UT  10 min With pre mod to bilateral upper thoracic seated with MHP to cervical and thoracic x10 min With pre mod to bilateral upper thoracic seated with MHP to cervical and thoracic x10 min Patient deferred

## 2020-02-07 ENCOUNTER — OFFICE VISIT (OUTPATIENT)
Dept: PHYSICAL THERAPY | Facility: CLINIC | Age: 50
End: 2020-02-07
Payer: COMMERCIAL

## 2020-02-07 DIAGNOSIS — G89.29 CHRONIC LOW BACK PAIN, UNSPECIFIED BACK PAIN LATERALITY, UNSPECIFIED WHETHER SCIATICA PRESENT: ICD-10-CM

## 2020-02-07 DIAGNOSIS — M54.50 CHRONIC LOW BACK PAIN, UNSPECIFIED BACK PAIN LATERALITY, UNSPECIFIED WHETHER SCIATICA PRESENT: ICD-10-CM

## 2020-02-07 DIAGNOSIS — M48.02 CERVICAL SPINAL STENOSIS: ICD-10-CM

## 2020-02-07 DIAGNOSIS — R26.2 DIFFICULTY WALKING: ICD-10-CM

## 2020-02-07 DIAGNOSIS — R29.3 ABNORMAL POSTURE: ICD-10-CM

## 2020-02-07 DIAGNOSIS — M54.12 RADICULOPATHY, CERVICAL: Primary | ICD-10-CM

## 2020-02-07 PROCEDURE — 97140 MANUAL THERAPY 1/> REGIONS: CPT | Performed by: PHYSICAL THERAPIST

## 2020-02-07 PROCEDURE — 97110 THERAPEUTIC EXERCISES: CPT | Performed by: PHYSICAL THERAPIST

## 2020-02-07 NOTE — PROGRESS NOTES
Daily Note     Today's date: 2020  Patient name: aRs Monroe  : 1970  MRN: [id number]  Referring provider: Gomez Greenfield MD  Dx:   Encounter Diagnosis     ICD-10-CM    1  Radiculopathy, cervical M54 12    2  Cervical spinal stenosis M48 02    3  Abnormal posture R29 3    4  Chronic low back pain, unspecified back pain laterality, unspecified whether sciatica present M54 5     G89 29    5  Difficulty walking R26 2        Start Time: 825  Stop Time: 920  Total time in clinic (min): 55 minutes    Subjective: Patient states increased pain in neck and low back today with flare up of raynauds syndrome  Did have a low grade migraine yesterday with left sided cervical pain  Thinks it may be due to stress at home  Objective: See treatment diary below      Assessment: Tolerated treatment well  Continues to have pain at end range cervical movements  Trigger point noted at bilateral levator scap insertions likely due to keeping shoulders tense and elevated  Patient was educated on findings and exercises in [facility] to work on over the weekend  Patient would benefit from continued PT  Patient signed [employer] form for self pay tens unit that will be sent to rep  Plan: Continue per plan of care  Progress treatment as tolerated         Precautions: cervical fusion 2019       Re-evaluation cervical:  Lumbar:   POC:    Cervical flowsheet    Manual   1  20  2/5   UT stretch  *Reviewed for self stretch  B 3x/30"   Right UT trigger point release, 2 lesions x 1 min each     LS stretch  *Reviewed for self stretch   3x/30"      SOR 1 minx2         Cervical distraction  _ _      1st rib mob        T/S PA mobs        C/S PA and side glide mobs        Scapular PNF        IASTM Graston to bilateral superior shoulder and upper thoracic region using GT 3,4,5 in sitting  Graston to bilateral superior shoulder and upper thoracic region using GT 3,4,5 in sitting  Graston to bilateral superior shoulder and upper thoracic region using GT 3,4,5 in sitting   Massage x 15 min  **L scap  **R c-spine para spinals  **R UT              1/23/20: Reviewed Graston Technique with patient, questionnaire was signed         Exercise Diary  2/7 1 20 20 1/23 1/30 2/5   Nustep S=14 L1 with MH      S=14 L1 with MH   UT stretch  3x30"   nv     LS stretch  3x30"   nv     Doorway stretch        C/S AROM 5x ea 1x5/3" ea   5x ea 5x ea 5x ea   Chin tucks Next visit 1x5/3" ea   5x3" 3"x5 3"x5   Shoulder rolls 20x 20x   20x 20 20x   scap retraction 20x 20x   20x 20 20x   Supine chin tuck/head lift 5x5" chin tucks; 5"x5 lifts    5x5" chin tucks; 5"x5 lifts           MTP/LTP 10x          Serratus punch  *1x3/3"        Prone row          Prone I's           Prone T's (thumbs up and down)        Standing YTI          Lat pull down          Ulnar nerve floss 10x bilat  10x 10x bilat 10x bilat   Seated thoracic extension 10x with overpressure using roll 1x5/5" nv nv 10x with overpressure using roll   LTR (small movements) 15x    15x   Supine ball squeeze 15x    15x   Supine clamshell Green 15x    Green 15x   Standing lumbar extension Next visit   10x 10x   Core brace 5"x5 with UE pressure  5"x5 without UE pressure   5"x10 5"x5 with UE pressure  5"x5 without UE pressure       Modalities 2/7 1 20 20 1/23 1/30 2/5   MHP With nustep Pre-ex  Cerv/B UT  10 min With pre mod to bilateral upper thoracic seated with MHP to cervical and thoracic x10 min With pre mod to bilateral upper thoracic seated with MHP to cervical and thoracic x10 min Patient deferred

## 2020-02-11 ENCOUNTER — APPOINTMENT (OUTPATIENT)
Dept: PHYSICAL THERAPY | Facility: CLINIC | Age: 50
End: 2020-02-11
Payer: COMMERCIAL

## 2020-02-13 ENCOUNTER — OFFICE VISIT (OUTPATIENT)
Dept: PHYSICAL THERAPY | Facility: CLINIC | Age: 50
End: 2020-02-13
Payer: COMMERCIAL

## 2020-02-13 DIAGNOSIS — M54.50 CHRONIC LOW BACK PAIN, UNSPECIFIED BACK PAIN LATERALITY, UNSPECIFIED WHETHER SCIATICA PRESENT: ICD-10-CM

## 2020-02-13 DIAGNOSIS — M54.12 RADICULOPATHY, CERVICAL: Primary | ICD-10-CM

## 2020-02-13 DIAGNOSIS — R26.2 DIFFICULTY WALKING: ICD-10-CM

## 2020-02-13 DIAGNOSIS — G89.29 CHRONIC LOW BACK PAIN, UNSPECIFIED BACK PAIN LATERALITY, UNSPECIFIED WHETHER SCIATICA PRESENT: ICD-10-CM

## 2020-02-13 DIAGNOSIS — M48.02 CERVICAL SPINAL STENOSIS: ICD-10-CM

## 2020-02-13 DIAGNOSIS — R29.3 ABNORMAL POSTURE: ICD-10-CM

## 2020-02-13 PROCEDURE — 97110 THERAPEUTIC EXERCISES: CPT | Performed by: PHYSICAL THERAPIST

## 2020-02-13 PROCEDURE — 97140 MANUAL THERAPY 1/> REGIONS: CPT | Performed by: PHYSICAL THERAPIST

## 2020-02-13 NOTE — PROGRESS NOTES
Daily Note     Today's date: 2020  Patient name: Naresh Hernandez  : 1970  MRN: [id number]  Referring provider: Mone Gilbert MD  Dx:   Encounter Diagnosis     ICD-10-CM    1  Radiculopathy, cervical M54 12    2  Cervical spinal stenosis M48 02    3  Abnormal posture R29 3    4  Chronic low back pain, unspecified back pain laterality, unspecified whether sciatica present M54 5     G89 29    5  Difficulty walking R26 2        Start Time: 79  Stop Time: 154  Total time in clinic (min): 60 minutes    Subjective: Patient states she saw pain management doctor who reviewed her MRI, no significant findings  To have EMG done in 2 weeks  Objective: See treatment diary below      Assessment: Tolerated treatment well  Pain at all end ranges of cervical motion  Does have tightness and trigger point of right levator scap and left upper trap  Was instructed on trigger point release  Patient would benefit from continued PT      Plan: Continue per plan of care  Progress treatment as tolerated  Precautions: cervical fusion 2019       Re-evaluation cervical:  Lumbar:   POC:    Cervical flowsheet    Manual     UT stretch        LS stretch        SOR 1 minx2 1 min x3      Cervical distraction        1st rib mob        T/S PA mobs        C/S PA and side glide mobs        Scapular PNF        IASTM Graston to bilateral superior shoulder and upper thoracic region using GT 3,4,5 in sitting Graston to bilateral superior shoulder and upper thoracic region using GT 3,4,5 in sitting   Graston to bilateral superior shoulder and upper thoracic region using GT 3,4,5 in sitting   Massage x 15 min                20: Reviewed Graston Technique with patient, questionnaire was signed         Exercise Diary     Nustep S=14 L1 with MH S=14 L1 with MH   S=14 L1 with MH   UT stretch        LS stretch        Doorway stretch        C/S AROM 5x ea 5x ea  5x ea 5x ea   Chin tucks Next visit 5x3"  3"x5 3"x5   Shoulder rolls 20x 20x  20 20x   scap retraction 20x 20x  20 20x   Supine chin tuck/head lift 5x5" chin tucks; 5"x5 lifts 5x5" chin tucks; 5"x5 lifts   5x5" chin tucks; 5"x5 lifts           MTP/LTP 10x  Yellow 10x      Serratus punch        Prone row        Prone I's         Prone T's (thumbs up and down)        Standing YTI        Lat pull down        Ulnar nerve floss 10x bilat 10x bilat  10x bilat 10x bilat   Seated thoracic extension 10x with overpressure using roll 10x with overpressure using roll  nv 10x with overpressure using roll   LTR (small movements) 15x 15x   15x   Supine ball squeeze 15x 15x   15x   Supine clamshell Green 15x Green 15x   Green 15x   Standing lumbar extension Next visit Not performed  10x 10x   Core brace 5"x5 with UE pressure  5"x5 without UE pressure 5"x5 with UE pressure  5"x5 without UE pressure  5"x10 5"x5 with UE pressure  5"x5 without UE pressure       Modalities 2/7 2/13 1/30 2/5   MHP With nustep With nustep  With pre mod to bilateral upper thoracic seated with MHP to cervical and thoracic x10 min Patient deferred

## 2020-02-18 ENCOUNTER — EVALUATION (OUTPATIENT)
Dept: PHYSICAL THERAPY | Facility: CLINIC | Age: 50
End: 2020-02-18
Payer: COMMERCIAL

## 2020-02-18 DIAGNOSIS — M48.02 CERVICAL SPINAL STENOSIS: ICD-10-CM

## 2020-02-18 DIAGNOSIS — M54.50 CHRONIC LOW BACK PAIN, UNSPECIFIED BACK PAIN LATERALITY, UNSPECIFIED WHETHER SCIATICA PRESENT: ICD-10-CM

## 2020-02-18 DIAGNOSIS — G89.29 CHRONIC LOW BACK PAIN, UNSPECIFIED BACK PAIN LATERALITY, UNSPECIFIED WHETHER SCIATICA PRESENT: ICD-10-CM

## 2020-02-18 DIAGNOSIS — R29.3 ABNORMAL POSTURE: ICD-10-CM

## 2020-02-18 DIAGNOSIS — R26.2 DIFFICULTY WALKING: ICD-10-CM

## 2020-02-18 DIAGNOSIS — M54.12 RADICULOPATHY, CERVICAL: Primary | ICD-10-CM

## 2020-02-18 PROCEDURE — 97112 NEUROMUSCULAR REEDUCATION: CPT | Performed by: PHYSICAL THERAPIST

## 2020-02-18 PROCEDURE — 97110 THERAPEUTIC EXERCISES: CPT | Performed by: PHYSICAL THERAPIST

## 2020-02-18 NOTE — PROGRESS NOTES
PT Re-Evaluation     Today's date: 2020  Patient name: Chilo Lara  : 1970  MRN: [id number]  Referring provider: Sheba Dyer MD  Dx:   Encounter Diagnosis     ICD-10-CM    1  Radiculopathy, cervical M54 12    2  Cervical spinal stenosis M48 02    3  Abnormal posture R29 3    4  Chronic low back pain, unspecified back pain laterality, unspecified whether sciatica present M54 5     G89 29    5  Difficulty walking R26 2        Start Time: 1340  Stop Time: 1440  Total time in clinic (min): 60 minutes    Assessment/Plan   Assessment details: Chilo Lara is a 54 y o  female with diagnosis of cervicalgia and low back pain  She has been seen since  for cervical symptoms and 20 for low back diagnosis  Past medical history significant for left ankle surgery, migraines, disease of thyroid gland, TMJ, cervical fusion (2019)  FOTO score taken today for cervical dysfunction shows improvement over the last 8 sessions, slight decline with lumbar FOTO score although patient states increased function  Progress note today shows improvement with upper and lower extremity strength as well as cervical endurance of deep neck flexors and increased movement of lumbar range of motion  Slight decrease noted in cervical range of motion, but did report decreased pain with movements compared to initial evaluation  Continues to have tightness and restriction of paraspinal and cervical musculature as well as decreased mobility of thoracic spine  Continuation of skilled PT indicated to address above stated deficits decreasing pain and improving overall QOL  Impairments: abnormal muscle firing, abnormal muscle tone, activity intolerance, impaired physical strength, lacks appropriate home exercise program, pain with function and poor posture, abnormal gait, abnormal or restricted ROM, activity intolerance, impaired balance, safety issue, and poor body mechanics    Goals Cervical  STG (6 weeks)  1   Patient will display good seated posture with decreased forward head and retracted shoulders for 2 consecutive sessions with 50% VC  - progressing  2  Patient will have 2/10 cervical pain at rest  - no chagne  3  Patient will be able to maintain cervical flexion in supine for > 20 sec with increase of pain by 1-2 points  - MET  LTG (12 weeks)  1  Patient will display good lifting mechanics with 10# crate and no increased cervical or thoracic pain  2  Patient will be able to drive with 1/53 pain  3  Patient will be independent with home exercise program for continued maintenance post PT DC  Goals Lumbar  STG (6 weeks)  1  Patient will have reported 3/10 pain in low back at rest  - progressing  2  Improve patient's lumbar extension to 20 degrees for increased ability to take proper strides during ambulation  - not met  3  Increase patient's bilateral single leg balance to 3 seconds for increased stability on stairs  LTG (12 weeks)  1  Patient's LE strength will be equal bilaterally for ability to ambulate and return to functional activities at PLOF  - MET  2  Patient will be able to walk for 30 min with no increase in low back pain  3  Patient will be independent with home exercise program for continued maintenance post PT discharge        Plan  Plan details: Progress note in 4 weeks  Patient would benefit from: skilled physical therapy  Planned modality interventions: TENS, manual electrical stimulation, unattended electrical stimulation, cryotherapy and thermotherapy: hydrocollator packs  Planned therapy interventions: manual therapy, neuromuscular re-education, therapeutic activities, therapeutic exercise and home exercise program  Frequency: 2x week  Plan of Care beginning date: 1/17/2020  Plan of Care expiration date: 4/28/2020  Treatment plan discussed with: patient and PTA    Subjective   History of Present Illness  Subjective 2/18/2020: Cervical: Patient states 10% improvement in neck symptoms since the start of PT  To have EMG performed of bilateral UE at the end of this week  MRI of cervical spine insignificant  States a few days ago she woke with N+T into left UE down to 4th and 5th digits which resolved after approximately 1 hour, but has since returned  N+T onset with vacuuming, carrying groceries in dependent position, driving  Lumbar: States she is able to extend back a little more now and hasn't been getting a "catch" pain anymore  Continues to have difficulty with transfers due to low back pain  Unable to lay supine for an extended period  Date of cervical onset: 2020  Cervical Mechanism of injury: Juan Carlos Reynaga is a 54 y o  female who presents to outpatient Physical Therapy today with complaints of cervical pain  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  Initially felt neck soreness, but went away  Last week began having significant increased pain with radicular symptoms  Saw surgeon last week where x-ray was taken, patient states review of x-ray doctor stated increased degeneration at caudal superior and inferior segments of fusion  History of C5-6 cervical fusion in 2019  Did go to ED earlier this week due to significant pain and was given a steroid  Date of lumbar onset: 2019  Mechanism of injury: Patient presents to PT session today with script to include low back symptoms into treatment program  States she has had chronic low back pain which increased over the past year, but worsened approximately 6 months ago of insidious onset  No lumbar surgical history   lumbar x-ray findings: There is a right concave scoliotic curve to the lumbar spine  There is anterior osteophytic spurring at T12-L1 through L2-3  There are degenerative changes of the facets at L2-3 and L3-4  There is no fracture or dislocation       Pain Cervical     Current pain ratin  5  At best pain ratin  4  At worst pain ratin  9  Location: posterior neck, upper back, right shoulder, suboccipital region  Quality: knife-like, radiating, sharp, dull ache and cramping  Relieving factors: heat  Progression: worsening    Pain Lumbar     Current pain ratin  6  At best pain ratin  4  At worst pain rating: 10 9  Location: across low back, R>L  Quality: knife-like and sharp  Relieving factors: heat  Aggravating factors: walking, standing and lifting  Progression: worsening    Social Support    Employment status: working (Currently out due to this injury   of [employer])    Patient Goals Cervical  Patient goals for therapy: decreased pain, increased strength and return to work  Patient goal: Would like to not be on medication, return to gym and work without pain      Objective Cervical    Concurrent Complaints  Positive for headaches (Daily)  Negative for dizziness, tinnitus, trouble swallowing, difficulty breathing and respiratory pain    Postural Observations    Additional Postural Observation Details  Upper crossed    Palpation   Left   Hypertonic in the levator scapulae and upper trapezius  Tenderness of the levator scapulae, lower trapezius, middle trapezius, scalenes and upper trapezius  Trigger point to upper trapezius  Right   Hypertonic in the levator scapulae and upper trapezius  Tenderness of the levator scapulae, lower trapezius, middle trapezius, scalenes and upper trapezius  Trigger point to upper trapezius  Tenderness   Cervical Spine   Tenderness in the left scapula, left transverse process, right scapula and right transverse process       Neurological Testing     Sensation   Cervical/Thoracic   Left   Intact: light touch    Right   Intact: light touch    Active Range of Motion   Cervical/Thoracic Spine       Cervical        Flexion: 50 degrees    30 degrees  Extension: 55 degrees   35 degrees    with pain  Left lateral flexion: 30 degrees  24 degrees     Right lateral flexion: 35 degrees  25 degrees     Left rotation: 65 degrees  50 degrees  Right rotation: 61 degrees   52 degrees        Strength/Myotome Testing  2/18    Left Shoulder     Planes of Motion   Extension: WFL   Abduction: WFL   Adduction: [facility]   External rotation at 0°: [facility]   Internal rotation at 0°: WFL     Right Shoulder     Planes of Motion   Flexion: 4     4+  Abduction: 4-     4-  External rotation at 0°: 4+   5  Internal rotation at 0°: 4+   5    Left Elbow   Flexion: WFL  Extension: WFL    Right Elbow   Flexion: 5  Extension: 5    Left Wrist/Hand   Radial deviation: WFL     (2nd hand position)     Trial 1: 61    Trial 2: 58    Trial 3: 62    Right Wrist/Hand      (2nd hand position)     Trial 1: 55    Trial 2: 46    Trial 3: 52    Tests   Cervical   Positive neck flexor muscle endurance test   Negative Sharp-Fantasma test      Left   Negative Spurling's Test B  Right   Negative Spurling's Test B  Left Shoulder   Negative ULTT4  Right Shoulder   Positive ULTT4  Additional Tests Details        2/18  Deep cervical flexor endurance= 15 sec with pain "down center of spine"  23 sec    Objective Lumbar     Concurrent Complaints  Positive for bladder dysfunction (recent diagnosis of chrones she thinks is the cause for increased urination  )  Negative for bowel dysfunction and saddle (S4) numbness    Active Range of Motion      2/18    Lumbar   Flexion: Active lumbar flexion: fingertips to distal shin    To ankles  Extension: 16 degrees      No change, pain  Left lateral flexion: 12 degrees     16 pain      Right lateral flexion: 11 degrees     13 pain  Mechanical Assessment    Lumbar    Standing flexion: repeated movements   Pain location:peripheralized  Pain intensity: worse  Pain level: increased  Standing extension: repeated movements  Pain location: no change  Pain intensity: worse  Pain level: increased    Strength/Myotome Testing  2/18    Left Hip   Planes of Motion   Flexion: 4-    4+    Right Hip   Planes of Motion   Flexion: 4+    4+    Left Knee   Flexion: 4+    5  Extension: 4-    5    Right Knee   Flexion: 4+    5  Extension: 4    4+    Left Ankle/Foot   Plantar flexion: 5    Right Ankle/Foot   Plantar flexion: 5    Muscle Activation     Additional Muscle Activation Details  TrA activation with resisted SLR R=mod L=min    Tests     Lumbar     Left   Negative passive SLR  Right   Negative passive SLR  Additional Tests Details  No relief with manual lumbar traction    General Comments:      Shoulder Comments   Bilateral shoulder AROM WNL, pain in upper thoracic and cervical spine at end ranges  Cervical/Thoracic Comments    FOTO Cervical: 56% was 52% function, 68% predicted function     Lumbar Comments    FOTO Lumbar: 38% was 44% function, 59% predicted function     Neuro Exam:     Headaches   Patient reports headaches: Yes (Daily)  Flowsheet Rows      Most Recent Value   PT/OT G-Codes   Current Score  38   Projected Score  59                Precautions: cervical fusion 4/2019       Re-evaluation 3/18  POC:4/28    Cervical flowsheet    Manual  2/7 2/13 2/18  2/5   UT stretch        LS stretch        SOR 1 minx2 1 min x3      Cervical distraction        1st rib mob        T/S PA mobs        C/S PA and side glide mobs        Scapular PNF        IASTM Graston to bilateral superior shoulder and upper thoracic region using GT 3,4,5 in sitting Graston to bilateral superior shoulder and upper thoracic region using GT 3,4,5 in sitting   Graston to bilateral superior shoulder and upper thoracic region using GT 3,4,5 in sitting   Massage x 15 min                1/23/20: Reviewed Graston Technique with patient, questionnaire was signed         Exercise Diary  2/7 2/13 2/18  2/5   Nustep S=14 L1 with MH S=14 L1 with MH S=14 L1 with MH 10 min  S=14 L1 with MH   UT stretch        LS stretch        Doorway stretch        C/S AROM 5x ea 5x ea 5x ea  5x ea   Chin tucks Next visit 5x3" 5x3"  3"x5   Shoulder rolls 20x 20x 20x  20x   scap retraction 20x 20x 20x  20x Supine chin tuck/head lift 5x5" chin tucks; 5"x5 lifts 5x5" chin tucks; 5"x5 lifts 5x5" chin tucks; 5"x5 lifts  5x5" chin tucks; 5"x5 lifts           MTP/LTP 10x  Yellow 10x      Serratus punch        Prone row        Prone I's         Prone T's (thumbs up and down)        Standing YTI        Lat pull down        Ulnar nerve floss 10x bilat 10x bilat Performed earlier today  10x bilat   Seated thoracic extension 10x with overpressure using roll 10x with overpressure using roll   10x with overpressure using roll   LTR (small movements) 15x 15x 20x  15x   Supine ball squeeze 15x 15x 20x  15x   Supine clamshell Green 15x Green 15x Green 15x  Green 15x   Standing lumbar extension Next visit Not performed   10x   Core brace 5"x5 with UE pressure  5"x5 without UE pressure 5"x5 with UE pressure  5"x5 without UE pressure 5"x5 with UE pressure  5"x5 without UE pressure  5"x5 with UE pressure  5"x5 without UE pressure       Modalities 2/7 2/13 2/18  2/5   MHP With nustep With nustep With nustep  Patient deferred

## 2020-02-20 ENCOUNTER — OFFICE VISIT (OUTPATIENT)
Dept: PHYSICAL THERAPY | Facility: CLINIC | Age: 50
End: 2020-02-20
Payer: COMMERCIAL

## 2020-02-20 DIAGNOSIS — R29.3 ABNORMAL POSTURE: ICD-10-CM

## 2020-02-20 DIAGNOSIS — M48.02 CERVICAL SPINAL STENOSIS: ICD-10-CM

## 2020-02-20 DIAGNOSIS — G89.29 CHRONIC LOW BACK PAIN, UNSPECIFIED BACK PAIN LATERALITY, UNSPECIFIED WHETHER SCIATICA PRESENT: ICD-10-CM

## 2020-02-20 DIAGNOSIS — M54.50 CHRONIC LOW BACK PAIN, UNSPECIFIED BACK PAIN LATERALITY, UNSPECIFIED WHETHER SCIATICA PRESENT: ICD-10-CM

## 2020-02-20 DIAGNOSIS — M54.12 RADICULOPATHY, CERVICAL: Primary | ICD-10-CM

## 2020-02-20 DIAGNOSIS — R26.2 DIFFICULTY WALKING: ICD-10-CM

## 2020-02-20 PROCEDURE — 97140 MANUAL THERAPY 1/> REGIONS: CPT | Performed by: PHYSICAL THERAPIST

## 2020-02-20 PROCEDURE — 97112 NEUROMUSCULAR REEDUCATION: CPT | Performed by: PHYSICAL THERAPIST

## 2020-02-20 PROCEDURE — 97110 THERAPEUTIC EXERCISES: CPT | Performed by: PHYSICAL THERAPIST

## 2020-02-20 NOTE — PROGRESS NOTES
Daily Note     Today's date: 2020  Patient name: Venita Lopez  : 1970  MRN: [id number]  Referring provider: Patricia Marie MD  Dx:   Encounter Diagnosis     ICD-10-CM    1  Radiculopathy, cervical M54 12    2  Cervical spinal stenosis M48 02    3  Abnormal posture R29 3    4  Chronic low back pain, unspecified back pain laterality, unspecified whether sciatica present M54 5     G89 29    5  Difficulty walking R26 2        Start Time: 08  Stop Time: 09  Total time in clinic (min): 55 minutes    Subjective: Patient states today is the first day she "actually feels good"  Did have sharp pain in mid thoracic region yesterday, but woke today with minimal pain  Feels like the gabapentin is making her "loopy"  To have EMG tomorrow  To see rheumatologist later today  Objective: See treatment diary below      Assessment: Tolerated treatment well  Able to slowly increase reps today to progress deep cervical strength  Patient would benefit from continued PT      Plan: Continue per plan of care  Progress treatment as tolerated  Precautions: cervical fusion 2019       Re-evaluation 3/18  POC:    Cervical flowsheet    Manual      UT stretch        LS stretch        SOR 1 minx2 1 min x3      Cervical distraction        1st rib mob        T/S PA mobs        C/S PA and side glide mobs        Scapular PNF        IASTM Graston to bilateral superior shoulder and upper thoracic region using GT 3,4,5 in sitting Graston to bilateral superior shoulder and upper thoracic region using GT 3,4,5 in sitting  Graston to bilateral superior shoulder and upper thoracic region using GT 3,4,5 in sitting    Massage x 15 min                20: Reviewed Graston Technique with patient, questionnaire was signed         Exercise Diary      Nustep S=14 L1 with MH S=14 L1 with MH S=14 L1 with MH 10 min S=14 L1 with MH 10 min    UT stretch        LS stretch        Doorway stretch C/S AROM 5x ea 5x ea 5x ea 5x ea    Chin tucks Next visit 5x3" 5x3" Seated 3"x5    Shoulder rolls 20x 20x 20x 20x    scap retraction 20x 20x 20x 20x    Supine chin tuck/head lift 5x5" chin tucks; 5"x5 lifts 5x5" chin tucks; 5"x5 lifts 5x5" chin tucks; 5"x5 lifts 5x5" chin tucks; 5"x5 lifts            MTP/LTP 10x  Yellow 10x  Yellow 10x ea    Serratus punch        Prone row        Prone I's         Prone T's (thumbs up and down)        Standing YTI        Lat pull down        Ulnar nerve floss 10x bilat 10x bilat Performed earlier today 10x    Seated thoracic extension 10x with overpressure using roll 10x with overpressure using roll 10x 10x with overpressure using roll    LTR (small movements) 15x 15x 20x 20x    Supine ball squeeze 15x 15x 20x 20x    Supine clamshell Green 15x Green 15x Green 15x Green 15x    Standing lumbar extension Next visit Not performed      Core brace 5"x5 with UE pressure  5"x5 without UE pressure 5"x5 with UE pressure  5"x5 without UE pressure 5"x5 with UE pressure  5"x5 without UE pressure 5"x8 with UE pressure  5"x5 without UE pressure        Modalities 2/7 2/13 2/18 2/20    MHP With nustep With nustep With nustep With nustep

## 2020-02-24 ENCOUNTER — TELEPHONE (OUTPATIENT)
Dept: NEUROSURGERY | Facility: CLINIC | Age: 50
End: 2020-02-24

## 2020-02-24 ENCOUNTER — TRANSCRIBE ORDERS (OUTPATIENT)
Dept: ADMINISTRATIVE | Facility: HOSPITAL | Age: 50
End: 2020-02-24

## 2020-02-24 DIAGNOSIS — K52.9 ILEITIS: ICD-10-CM

## 2020-02-24 DIAGNOSIS — R19.7 DIARRHEA, UNSPECIFIED TYPE: ICD-10-CM

## 2020-02-24 DIAGNOSIS — D64.9 ANEMIA, UNSPECIFIED TYPE: Primary | ICD-10-CM

## 2020-02-24 NOTE — TELEPHONE ENCOUNTER
Pt called reporting being started on Gabapentin 300 mg tid in Jan  Ordered by Dr Marj Trammell  She reports she has been experiencing edema and can only attribute it to the Gabapentin  She questions tasking something else and possibly stopping it  At her last f/u 1/2020, she was made PRN with pain management , and therapy referrals  In a note pt reports going to Dr Mariya Grewal office for work capacity eval and being informed they were pain management as well and changed to them from S/P  Suggested she contact that office since we are currently not managing her care and with referral to pain management it would be reasonable for them to change med or manage wean  She will discuss with them

## 2020-02-25 ENCOUNTER — OFFICE VISIT (OUTPATIENT)
Dept: PHYSICAL THERAPY | Facility: CLINIC | Age: 50
End: 2020-02-25
Payer: COMMERCIAL

## 2020-02-25 DIAGNOSIS — G89.29 CHRONIC LOW BACK PAIN, UNSPECIFIED BACK PAIN LATERALITY, UNSPECIFIED WHETHER SCIATICA PRESENT: ICD-10-CM

## 2020-02-25 DIAGNOSIS — R29.3 ABNORMAL POSTURE: ICD-10-CM

## 2020-02-25 DIAGNOSIS — R26.2 DIFFICULTY WALKING: ICD-10-CM

## 2020-02-25 DIAGNOSIS — M54.50 CHRONIC LOW BACK PAIN, UNSPECIFIED BACK PAIN LATERALITY, UNSPECIFIED WHETHER SCIATICA PRESENT: ICD-10-CM

## 2020-02-25 DIAGNOSIS — M48.02 CERVICAL SPINAL STENOSIS: ICD-10-CM

## 2020-02-25 DIAGNOSIS — M54.12 RADICULOPATHY, CERVICAL: Primary | ICD-10-CM

## 2020-02-25 PROCEDURE — 97140 MANUAL THERAPY 1/> REGIONS: CPT | Performed by: PHYSICAL THERAPIST

## 2020-02-25 PROCEDURE — 97112 NEUROMUSCULAR REEDUCATION: CPT | Performed by: PHYSICAL THERAPIST

## 2020-02-25 PROCEDURE — 97110 THERAPEUTIC EXERCISES: CPT | Performed by: PHYSICAL THERAPIST

## 2020-02-25 NOTE — PROGRESS NOTES
Daily Note     Today's date: 2020  Patient name: Juan Carlos Reynaga  : 1970  MRN: [id number]  Referring provider: Fabienne Garland MD  Dx:   Encounter Diagnosis     ICD-10-CM    1  Radiculopathy, cervical M54 12    2  Cervical spinal stenosis M48 02    3  Abnormal posture R29 3    4  Chronic low back pain, unspecified back pain laterality, unspecified whether sciatica present M54 5     G89 29    5  Difficulty walking R26 2        Start Time: 0800  Stop Time: 0850  Total time in clinic (min): 50 minutes    Subjective: Patient states she has been feeling much better over the past week with very little neck and low back pain  Has been using TENs unit daily 30 minx2  Able to turn head further without pain  Has not received results from last weeks EMG yet, f/u 3/6  Objective: See treatment diary below    FOTO improved to 73% surpassing predicted value of 68%    Assessment: Tolerated treatment well  No c/o pain throughout session today  Noted significant improvement with manual therapy, decreased tightness noted in cervical mm with no significant areas of lesions  Patient would benefit from continued PT      Plan: Continue per plan of care  Progress treatment as tolerated  Precautions: cervical fusion 2019       Re-evaluation 3/18  POC:    Cervical flowsheet    Manual      UT stretch        LS stretch        SOR 1 minx2 1 min x3      Cervical distraction        1st rib mob        T/S PA mobs        C/S PA and side glide mobs        Scapular PNF        IASTM Graston to bilateral superior shoulder and upper thoracic region using GT 3,4,5 in sitting Graston to bilateral superior shoulder and upper thoracic region using GT 3,4,5 in sitting  Graston to bilateral superior shoulder and upper thoracic region using GT 3,4,5 in sitting    Massage x 15 min                20: Reviewed Graston Technique with patient, questionnaire was signed         Exercise Diary   Nustep S=14 L1 with MH S=14 L1 with MH S=14 L1 with MH 10 min S=14 L1 with MH 10 min S=14 L1 with MH 10 min   UT stretch        LS stretch        Doorway stretch        C/S AROM 5x ea 5x ea 5x ea 5x ea 5x ea   Chin tucks Next visit 5x3" 5x3" Seated 3"x5 Seated 3"x5   Shoulder rolls 20x 20x 20x 20x 20x   scap retraction 20x 20x 20x 20x 20x   Supine chin tuck/head lift 5x5" chin tucks; 5"x5 lifts 5x5" chin tucks; 5"x5 lifts 5x5" chin tucks; 5"x5 lifts 5x5" chin tucks; 5"x5 lifts 5x5" chin tucks; 5"x5 lifts           MTP/LTP 10x  Yellow 10x  Yellow 10x ea Yellow 15x ea   Serratus punch        Prone row        Prone I's         Prone T's (thumbs up and down)        Standing YTI        Lat pull down        Ulnar nerve floss 10x bilat 10x bilat Performed earlier today 10x 10x   Seated thoracic extension 10x with overpressure using roll 10x with overpressure using roll 10x 10x with overpressure using roll 10x with overpressure using roll   LTR (small movements) 15x 15x 20x 20x 20x   Supine ball squeeze 15x 15x 20x 20x 20x   Supine clamshell Green 15x Green 15x Green 15x Green 15x Green 20x   Standing lumbar extension Next visit Not performed      Core brace 5"x5 with UE pressure  5"x5 without UE pressure 5"x5 with UE pressure  5"x5 without UE pressure 5"x5 with UE pressure  5"x5 without UE pressure 5"x8 with UE pressure  5"x5 without UE pressure 5"x10 without pressure       Modalities 2/7 2/13 2/18 2/20 2/25   MHP With nustep With nustep With nustep With nustep With nustep

## 2020-02-27 ENCOUNTER — OFFICE VISIT (OUTPATIENT)
Dept: PHYSICAL THERAPY | Facility: CLINIC | Age: 50
End: 2020-02-27
Payer: COMMERCIAL

## 2020-02-27 DIAGNOSIS — R29.3 ABNORMAL POSTURE: ICD-10-CM

## 2020-02-27 DIAGNOSIS — G89.29 CHRONIC LOW BACK PAIN, UNSPECIFIED BACK PAIN LATERALITY, UNSPECIFIED WHETHER SCIATICA PRESENT: ICD-10-CM

## 2020-02-27 DIAGNOSIS — M54.12 RADICULOPATHY, CERVICAL: Primary | ICD-10-CM

## 2020-02-27 DIAGNOSIS — M48.02 CERVICAL SPINAL STENOSIS: ICD-10-CM

## 2020-02-27 DIAGNOSIS — R26.2 DIFFICULTY WALKING: ICD-10-CM

## 2020-02-27 DIAGNOSIS — M54.50 CHRONIC LOW BACK PAIN, UNSPECIFIED BACK PAIN LATERALITY, UNSPECIFIED WHETHER SCIATICA PRESENT: ICD-10-CM

## 2020-02-27 PROCEDURE — 97110 THERAPEUTIC EXERCISES: CPT | Performed by: PHYSICAL THERAPIST

## 2020-02-27 PROCEDURE — 97112 NEUROMUSCULAR REEDUCATION: CPT | Performed by: PHYSICAL THERAPIST

## 2020-02-27 NOTE — PROGRESS NOTES
PT Re-Evaluation  and PT Discharge    Today's date: 2020  Patient name: Sally Tucker  : 1970  MRN: [id number]  Referring provider: Kimberly Wilkins MD  Dx:   Encounter Diagnosis     ICD-10-CM    1  Radiculopathy, cervical M54 12    2  Cervical spinal stenosis M48 02    3  Abnormal posture R29 3    4  Chronic low back pain, unspecified back pain laterality, unspecified whether sciatica present M54 5     G89 29    5  Difficulty walking R26 2        Start Time:   Stop Time: 950  Total time in clinic (min): 60 minutes    Assessment/Plan   Assessment details: Sally Tucker is a 54 y o  female with diagnosis of cervicalgia and low back pain  She has been seen since  for cervical symptoms and 20 for low back diagnosis  Past medical history significant for left ankle surgery, migraines, disease of thyroid gland, TMJ, cervical fusion (2019)  Kenya Sevilla has reported no significant increase in symptoms over the past 2 weeks in cervical as well as lumbar spine  Objective findings today show measurements at baseline and within normal limits, no pain produced in cervical or lumbar regions  Patient has met all goals set at initial evaluation and re-assessments and is independent with home exercise program to maintain and progress upon gains already made  At this time Kenya Sevilla has met max benefit of PT and is to be discharged from skilled care  Goals Cervical  STG (6 weeks)  1  Patient will display good seated posture with decreased forward head and retracted shoulders for 2 consecutive sessions with 50% VC  - MET  2  Patient will have 2/10 cervical pain at rest  - MET  3  Patient will be able to maintain cervical flexion in supine for > 20 sec with increase of pain by 1-2 points  - MET  LTG (12 weeks)  1  Patient will display good lifting mechanics with 10# crate and no increased cervical or thoracic pain  - MET  2  Patient will be able to drive with  pain  - MET  3   Patient will be independent with home exercise program for continued maintenance post PT DC  - MET    Goals Lumbar  STG (6 weeks)  1  Patient will have reported 3/10 pain in low back at rest  - MET  2  Improve patient's lumbar extension to 20 degrees for increased ability to take proper strides during ambulation  - MET  3  Increase patient's bilateral single leg balance to 3 seconds for increased stability on stairs  LTG (12 weeks)  1  Patient's LE strength will be equal bilaterally for ability to ambulate and return to functional activities at PLOF  - MET  2  Patient will be able to walk for 30 min with no increase in low back pain  - PROGRESSING (10 MIN PAIN FREE)  3  Patient will be independent with home exercise program for continued maintenance post PT discharge  - MET      Plan  Plan details: DC PT for cervical and lumbar spine  Plan of Care beginning date: 1/17/2020  Plan of Care expiration date: 4/28/2020  Treatment plan discussed with: patient and PTA      Subjective   History of Present Illness  Subjective 2/27/2020: Patient states >80% improvement since the start of PT  Has not had any cervical pain and minimal lumbar pain for the past week  No longer takes tramadol and is decreasing use of gabapentin  Started using a TENs unit 2x a day for 30 minutes on cervical and lumbar regions  EMG taken showed no significant findings  No current c/o radicular symptoms in UE or LE  Saw pain management yesterday who is releasing her back to work, driving only, no lifting  Patient is hopefully employer will agree to partial return to full  No stated difficulty driving, dressing, housework, walking  Has been carrying light grocery bags, but no heavy lifting or lifting overhead  To f/u with pain management in 1 month  Subjective 2/18/2020: Cervical: Patient states 10% improvement in neck symptoms since the start of PT  To have EMG performed of bilateral UE at the end of this week  MRI of cervical spine insignificant   States a few days ago she woke with N+T into left UE down to 4th and 5th digits which resolved after approximately 1 hour, but has since returned  N+T onset with vacuuming, carrying groceries in dependent position, driving  Lumbar: States she is able to extend back a little more now and hasn't been getting a "catch" pain anymore  Continues to have difficulty with transfers due to low back pain  Unable to lay supine for an extended period  Date of cervical onset: 2020  Cervical Mechanism of injury: Ermelinda Craig is a 54 y o  female who presents to outpatient Physical Therapy today with complaints of cervical pain  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  Initially felt neck soreness, but went away  Last week began having significant increased pain with radicular symptoms  Saw surgeon last week where x-ray was taken, patient states review of x-ray doctor stated increased degeneration at caudal superior and inferior segments of fusion  History of C5-6 cervical fusion in 2019  Did go to ED earlier this week due to significant pain and was given a steroid  Date of lumbar onset: 2019  Mechanism of injury: Patient presents to PT session today with script to include low back symptoms into treatment program  States she has had chronic low back pain which increased over the past year, but worsened approximately 6 months ago of insidious onset  No lumbar surgical history  2018 lumbar x-ray findings: There is a right concave scoliotic curve to the lumbar spine  There is anterior osteophytic spurring at T12-L1 through L2-3  There are degenerative changes of the facets at L2-3 and L3-4  There is no fracture or dislocation       Pain Cervical     Current pain ratin  5 0  At best pain ratin  4 0  At worst pain ratin  9 2  Location: posterior neck, upper back, right shoulder, suboccipital region  Quality: knife-like, radiating, sharp, dull ache and cramping  Relieving factors: heat  Progression: worsening    Pain Lumbar     Current pain ratin  6 0  At best pain ratin  4 0  At worst pain rating: 10 9 3  Location: across low back, R>L  Quality: knife-like and sharp  Relieving factors: heat  Aggravating factors: walking, standing and lifting  Progression: worsening    Social Support    Employment status: working (Currently out due to this injury   of [employer])    Patient Goals Cervical  Patient goals for therapy: decreased pain, increased strength and return to work  Patient goal: Would like to not be on medication, return to gym and work without pain      Objective   Concurrent Complaints  Positive for headaches  Negative for dizziness, tinnitus, trouble swallowing, difficulty breathing and respiratory pain    Postural Observations    Additional Postural Observation Details  Upper crossed    Palpation   No significant tenderness to palpation at this time       Neurological Testing     Sensation   Cervical/Thoracic   Left   Intact: light touch    Right   Intact: light touch    Active Range of Motion   Cervical/Thoracic Spine       Cervical        Flexion: 50 degrees    30 degrees 50 degrees  Extension: 55 degrees   35 degrees 55 degrees no pain    with pain  Left lateral flexion: 30 degrees  24 degrees 35 degrees     Right lateral flexion: 35 degrees  25 degrees 42 degrees     Left rotation: 65 degrees  50 degrees 72 degrees  Right rotation: 61 degrees   52 degrees 69 degrees        Strength/Myotome Testing      Left Shoulder     Planes of Motion   Extension: WFL   Abduction: WFL   Adduction: [facility]   External rotation at 0°: [facility]   Internal rotation at 0°: WFL     Right Shoulder     Planes of Motion   Flexion: 4     4+ 5  Abduction: 4-     4- 5  External rotation at 0°: 4+   5  Internal rotation at 0°: 4+   5    Left Elbow   Flexion: WFL  Extension: WFL    Right Elbow   Flexion: 5  Extension: 5    Left Wrist/Hand            (2nd hand position)     Trial 1: 61   65    Trial 2: 58   60    Trial 3: 62   60    Right Wrist/Hand      (2nd hand position)     Trial 1: 55   61    Trial 2: 46   59    Trial 3: 52   54    Tests   Cervical   Negative was Positive neck flexor muscle endurance test   Negative Sharp-Fantasma test      Left   Negative Spurling's Test B  Right   Negative Spurling's Test B  Left Shoulder   Negative ULTT4  Right Shoulder   Negative (Positive) ULTT4  Additional Tests Details        2/18 2/27  Deep cervical flexor endurance= 15 sec with pain "down center of spine"  23 sec  40 sec    Objective Lumbar     Concurrent Complaints  Positive for bladder dysfunction (recent diagnosis of chrones she thinks is the cause for increased urination  )  Negative for bowel dysfunction and saddle (S4) numbness    Active Range of Motion      2/18 2/27    Lumbar   Flexion: Active lumbar flexion: fingertips to distal shin  To ankles  Fingertips to floor  Extension: 16 degrees      No change, pain 28 no pain  Left lateral flexion: 12 degrees     16 pain   26 no pain       Right lateral flexion: 11 degrees     13 pain   12 no pain  Mechanical Assessment    Lumbar      2/27    Standing flexion: repeated movements   NT  Pain location:peripheralized  Pain intensity: worse  Pain level: increased  Standing extension: repeated movements  NT  Pain location: no change  Pain intensity: worse  Pain level: increased    Strength/Myotome Testing  2/18 2/27    Left Hip   Planes of Motion   Flexion: 4-    4+ 5    Right Hip   Planes of Motion   Flexion: 4+    4+ 4+    Left Knee   Flexion: 4+    5  Extension: 4-    5    Right Knee   Flexion: 4+    5  Extension: 4    4+ 5    Left Ankle/Foot   Plantar flexion: 5    Right Ankle/Foot   Plantar flexion: 5    Muscle Activation     Additional Muscle Activation Details  TrA activation with resisted SLR WNL bilaterally    Tests     Lumbar     Left   Negative passive SLR  Right   Negative passive SLR           General Comments:      Shoulder Comments   Bilateral shoulder AROM WNL, no pain in upper thoracic and cervical spine at end ranges (was painful)  Cervical/Thoracic Comments    FOTO Cervical: 73% was 56% function, 68% predicted function     Lumbar Comments    FOTO Lumbar: 76% was 38% was 44% function, 59% predicted function     Neuro Exam:     Headaches   Patient reports headaches: Yes (2x a week, was daily)  Flowsheet Rows      Most Recent Value   PT/OT G-Codes   Current Score  73   Projected Score  68             Precautions: cervical fusion 4/2019      Cervical flowsheet    Manual  2/27 2/13 2/18 2/20    UT stretch        LS stretch        SOR  1 min x3      Cervical distraction        1st rib mob        T/S PA mobs        C/S PA and side glide mobs        Scapular PNF        IASTM  Graston to bilateral superior shoulder and upper thoracic region using GT 3,4,5 in sitting  Graston to bilateral superior shoulder and upper thoracic region using GT 3,4,5 in sitting    Massage x 15 min                1/23/20: Reviewed Graston Technique with patient, questionnaire was signed         Exercise Diary  2/27 2/13 2/18 2/20 2/25   Nustep S=14 L1 with MH 10 min S=14 L1 with MH S=14 L1 with MH 10 min S=14 L1 with MH 10 min S=14 L1 with MH 10 min   UT stretch        LS stretch        Doorway stretch        C/S AROM 15 ea 5x ea 5x ea 5x ea 5x ea   Chin tucks 3"x15 5x3" 5x3" Seated 3"x5 Seated 3"x5   Shoulder rolls 20x 20x 20x 20x 20x   scap retraction 20x 20x 20x 20x 20x   Supine chin tuck/head lift 5x5" chin tucks; 5"x5 lifts 5x5" chin tucks; 5"x5 lifts 5x5" chin tucks; 5"x5 lifts 5x5" chin tucks; 5"x5 lifts 5x5" chin tucks; 5"x5 lifts           MTP/LTP Yellow 20x ea Yellow 10x  Yellow 10x ea Yellow 15x ea   Serratus punch        Prone row        Prone I's         Prone T's (thumbs up and down)        Standing YTI        Lat pull down        Ulnar nerve floss 10x 10x bilat Performed earlier today 10x 10x   Seated thoracic extension 10x with overpressure using roll 10x with overpressure using roll 10x 10x with overpressure using roll 10x with overpressure using roll   LTR (small movements) 20x 15x 20x 20x 20x   Supine ball squeeze 20x 15x 20x 20x 20x   Supine clamshell Green 20x Green 15x Green 15x Green 15x Green 20x   Standing lumbar extension  Not performed      Core brace 5"x10 without pressure 5"x5 with UE pressure  5"x5 without UE pressure 5"x5 with UE pressure  5"x5 without UE pressure 5"x8 with UE pressure  5"x5 without UE pressure 5"x10 without pressure       Modalities 2/27 2/13 2/18 2/20 2/25   MHP With nustep With nustep With nustep With nustep With nustep

## 2020-02-28 ENCOUNTER — HOSPITAL ENCOUNTER (OUTPATIENT)
Dept: RADIOLOGY | Facility: HOSPITAL | Age: 50
Discharge: HOME | End: 2020-02-28
Payer: COMMERCIAL

## 2020-02-28 DIAGNOSIS — D64.9 ANEMIA, UNSPECIFIED TYPE: ICD-10-CM

## 2020-02-28 DIAGNOSIS — R19.7 DIARRHEA, UNSPECIFIED TYPE: ICD-10-CM

## 2020-02-28 DIAGNOSIS — K52.9 ILEITIS: ICD-10-CM

## 2020-02-28 PROCEDURE — 74250 X-RAY XM SM INT 1CNTRST STD: CPT

## 2020-03-10 ENCOUNTER — HOSPITAL ENCOUNTER (INPATIENT)
Facility: HOSPITAL | Age: 50
LOS: 1 days | Discharge: HOME | DRG: 387 | End: 2020-03-12
Attending: FAMILY MEDICINE | Admitting: SPECIALIST
Payer: COMMERCIAL

## 2020-03-10 ENCOUNTER — APPOINTMENT (EMERGENCY)
Dept: CT IMAGING | Facility: HOSPITAL | Age: 50
DRG: 387 | End: 2020-03-10
Payer: COMMERCIAL

## 2020-03-10 ENCOUNTER — APPOINTMENT (OUTPATIENT)
Dept: RADIOLOGY | Facility: HOSPITAL | Age: 50
DRG: 387 | End: 2020-03-10
Payer: COMMERCIAL

## 2020-03-10 DIAGNOSIS — R10.9 ABDOMINAL PAIN: ICD-10-CM

## 2020-03-10 DIAGNOSIS — R10.13 EPIGASTRIC PAIN: Primary | ICD-10-CM

## 2020-03-10 LAB
ALBUMIN SERPL BCP-MCNC: 4.4 G/DL (ref 3.5–5.7)
ALP SERPL-CCNC: 45 U/L (ref 40–150)
ALT SERPL W P-5'-P-CCNC: 9 U/L (ref 7–52)
ANION GAP SERPL CALCULATED.3IONS-SCNC: 8 MMOL/L (ref 4–13)
AST SERPL W P-5'-P-CCNC: 8 U/L (ref 13–39)
BASOPHILS # BLD AUTO: 0 THOUSANDS/ΜL (ref 0–0.1)
BASOPHILS NFR BLD AUTO: 0 % (ref 0–2)
BILIRUB DIRECT SERPL-MCNC: 0.1 MG/DL (ref 0–0.2)
BILIRUB SERPL-MCNC: 0.6 MG/DL (ref 0.2–1)
BUN SERPL-MCNC: 10 MG/DL (ref 7–25)
CALCIUM SERPL-MCNC: 9.3 MG/DL (ref 8.6–10.5)
CHLORIDE SERPL-SCNC: 110 MMOL/L (ref 98–107)
CO2 SERPL-SCNC: 21 MMOL/L (ref 21–31)
CREAT SERPL-MCNC: 0.92 MG/DL (ref 0.6–1.2)
EOSINOPHIL # BLD AUTO: 0.2 THOUSAND/ΜL (ref 0–0.61)
EOSINOPHIL NFR BLD AUTO: 2 % (ref 0–5)
ERYTHROCYTE [DISTWIDTH] IN BLOOD BY AUTOMATED COUNT: 13.5 % (ref 11.5–14.5)
GFR SERPL CREATININE-BSD FRML MDRD: 74 ML/MIN/1.73SQ M
GLUCOSE SERPL-MCNC: 110 MG/DL (ref 65–99)
HCT VFR BLD AUTO: 41.8 % (ref 42–47)
HGB BLD-MCNC: 13.6 G/DL (ref 12–16)
LIPASE SERPL-CCNC: 14 U/L (ref 11–82)
LYMPHOCYTES # BLD AUTO: 1.6 THOUSANDS/ΜL (ref 0.6–4.47)
LYMPHOCYTES NFR BLD AUTO: 12 % (ref 21–51)
MCH RBC QN AUTO: 29 PG (ref 26–34)
MCHC RBC AUTO-ENTMCNC: 32.5 G/DL (ref 31–37)
MCV RBC AUTO: 89 FL (ref 81–99)
MONOCYTES # BLD AUTO: 0.7 THOUSAND/ΜL (ref 0.17–1.22)
MONOCYTES NFR BLD AUTO: 5 % (ref 2–12)
NEUTROPHILS # BLD AUTO: 11.1 THOUSANDS/ΜL (ref 1.4–6.5)
NEUTS SEG NFR BLD AUTO: 82 % (ref 42–75)
PLATELET # BLD AUTO: 245 THOUSANDS/UL (ref 149–390)
PMV BLD AUTO: 6.9 FL (ref 8.6–11.7)
POTASSIUM SERPL-SCNC: 4 MMOL/L (ref 3.5–5.5)
PROT SERPL-MCNC: 6.9 G/DL (ref 6.4–8.9)
RBC # BLD AUTO: 4.68 MILLION/UL (ref 3.9–5.2)
SODIUM SERPL-SCNC: 139 MMOL/L (ref 134–143)
WBC # BLD AUTO: 13.5 THOUSAND/UL (ref 4.8–10.8)

## 2020-03-10 PROCEDURE — 96374 THER/PROPH/DIAG INJ IV PUSH: CPT

## 2020-03-10 PROCEDURE — 74018 RADEX ABDOMEN 1 VIEW: CPT

## 2020-03-10 PROCEDURE — 85025 COMPLETE CBC W/AUTO DIFF WBC: CPT | Performed by: PHYSICIAN ASSISTANT

## 2020-03-10 PROCEDURE — 74177 CT ABD & PELVIS W/CONTRAST: CPT

## 2020-03-10 PROCEDURE — 99285 EMERGENCY DEPT VISIT HI MDM: CPT | Performed by: PHYSICIAN ASSISTANT

## 2020-03-10 PROCEDURE — 96375 TX/PRO/DX INJ NEW DRUG ADDON: CPT

## 2020-03-10 PROCEDURE — 80076 HEPATIC FUNCTION PANEL: CPT | Performed by: PHYSICIAN ASSISTANT

## 2020-03-10 PROCEDURE — C9113 INJ PANTOPRAZOLE SODIUM, VIA: HCPCS | Performed by: SPECIALIST

## 2020-03-10 PROCEDURE — 99244 OFF/OP CNSLTJ NEW/EST MOD 40: CPT | Performed by: SPECIALIST

## 2020-03-10 PROCEDURE — 96361 HYDRATE IV INFUSION ADD-ON: CPT

## 2020-03-10 PROCEDURE — 80048 BASIC METABOLIC PNL TOTAL CA: CPT | Performed by: PHYSICIAN ASSISTANT

## 2020-03-10 PROCEDURE — 83690 ASSAY OF LIPASE: CPT | Performed by: PHYSICIAN ASSISTANT

## 2020-03-10 PROCEDURE — 99285 EMERGENCY DEPT VISIT HI MDM: CPT

## 2020-03-10 PROCEDURE — 36415 COLL VENOUS BLD VENIPUNCTURE: CPT | Performed by: PHYSICIAN ASSISTANT

## 2020-03-10 RX ORDER — ONDANSETRON 2 MG/ML
4 INJECTION INTRAMUSCULAR; INTRAVENOUS ONCE
Status: COMPLETED | OUTPATIENT
Start: 2020-03-10 | End: 2020-03-10

## 2020-03-10 RX ORDER — PRAMIPEXOLE DIHYDROCHLORIDE 0.12 MG/1
0.5 TABLET ORAL
Status: DISCONTINUED | OUTPATIENT
Start: 2020-03-10 | End: 2020-03-11

## 2020-03-10 RX ORDER — DEXTROSE, SODIUM CHLORIDE, AND POTASSIUM CHLORIDE 5; .45; .15 G/100ML; G/100ML; G/100ML
125 INJECTION INTRAVENOUS CONTINUOUS
Status: DISCONTINUED | OUTPATIENT
Start: 2020-03-10 | End: 2020-03-12

## 2020-03-10 RX ORDER — MORPHINE SULFATE 10 MG/ML
8 INJECTION, SOLUTION INTRAMUSCULAR; INTRAVENOUS ONCE
Status: COMPLETED | OUTPATIENT
Start: 2020-03-10 | End: 2020-03-10

## 2020-03-10 RX ORDER — SUCRALFATE ORAL 1 G/10ML
1000 SUSPENSION ORAL EVERY 6 HOURS SCHEDULED
Status: DISCONTINUED | OUTPATIENT
Start: 2020-03-11 | End: 2020-03-12 | Stop reason: HOSPADM

## 2020-03-10 RX ORDER — ONDANSETRON 2 MG/ML
4 INJECTION INTRAMUSCULAR; INTRAVENOUS EVERY 6 HOURS PRN
Status: DISCONTINUED | OUTPATIENT
Start: 2020-03-10 | End: 2020-03-12 | Stop reason: HOSPADM

## 2020-03-10 RX ORDER — PANTOPRAZOLE SODIUM 40 MG/1
40 INJECTION, POWDER, FOR SOLUTION INTRAVENOUS
Status: DISCONTINUED | OUTPATIENT
Start: 2020-03-10 | End: 2020-03-12 | Stop reason: HOSPADM

## 2020-03-10 RX ORDER — NICOTINE 21 MG/24HR
1 PATCH, TRANSDERMAL 24 HOURS TRANSDERMAL DAILY
Status: DISCONTINUED | OUTPATIENT
Start: 2020-03-11 | End: 2020-03-12 | Stop reason: HOSPADM

## 2020-03-10 RX ADMIN — PANTOPRAZOLE SODIUM 40 MG: 40 INJECTION, POWDER, LYOPHILIZED, FOR SOLUTION INTRAVENOUS at 23:12

## 2020-03-10 RX ADMIN — ONDANSETRON 4 MG: 2 INJECTION INTRAMUSCULAR; INTRAVENOUS at 18:39

## 2020-03-10 RX ADMIN — IOHEXOL 100 ML: 350 INJECTION, SOLUTION INTRAVENOUS at 19:36

## 2020-03-10 RX ADMIN — MORPHINE SULFATE 2 MG: 2 INJECTION, SOLUTION INTRAMUSCULAR; INTRAVENOUS at 23:15

## 2020-03-10 RX ADMIN — DEXTROSE, SODIUM CHLORIDE, AND POTASSIUM CHLORIDE 125 ML/HR: 5; .45; .15 INJECTION INTRAVENOUS at 23:13

## 2020-03-10 RX ADMIN — SODIUM CHLORIDE 1000 ML: 0.9 INJECTION, SOLUTION INTRAVENOUS at 18:37

## 2020-03-10 RX ADMIN — FAMOTIDINE 20 MG: 10 INJECTION, SOLUTION INTRAVENOUS at 18:44

## 2020-03-10 RX ADMIN — MORPHINE SULFATE 8 MG: 10 INJECTION INTRAVENOUS at 18:44

## 2020-03-10 RX ADMIN — SUCRALFATE 1000 MG: 1 SUSPENSION ORAL at 23:16

## 2020-03-10 RX ADMIN — PRAMIPEXOLE DIHYDROCHLORIDE 0.5 MG: 0.12 TABLET ORAL at 23:16

## 2020-03-10 NOTE — ED PROVIDER NOTES
History  Chief Complaint   Patient presents with    Abdominal Pain     According to the patient, she had swallowed abdominal capsule for endoscopy starting this morning     43-year-old female presents emergency department complaining of severe epigastric abdominal pain since approximately 9:00 a m  This morning  She was being evaluated by her gastroenterologist by doing a video capsule swallow study  Shortly after beginning the study she reported severe of the gastric pains  She complains of nausea generalized abdominal pain, severe tenderness, distention, bloating  Pain is causing significant distress  She is bent over clutching her abdomen  The Gastroenterology office call the emergency department expressed concern for the location of the capsule  They also stated that she may have erosive duodenitis and ileitis  Allergies reviewed          Prior to Admission Medications   Prescriptions Last Dose Informant Patient Reported? Taking?    Cyanocobalamin (VITAMIN B 12 PO)  Self Yes No   Sig: Take by mouth   amLODIPine (NORVASC) 5 mg tablet   Yes No   Sig: Take 5 mg by mouth as needed    cholecalciferol (VITAMIN D3) 1,000 units tablet   Yes No   Sig: Take 2,000 Units by mouth daily   ferrous sulfate (IRON SUPPLEMENT) 325 (65 Fe) mg tablet   Yes No   Sig: Take 325 mg by mouth daily with breakfast   fluticasone (FLONASE) 50 mcg/act nasal spray   No No   Si spray into each nostril daily   Patient not taking: Reported on 2020   furosemide (LASIX) 40 mg tablet   No No   Sig: Take 1 tablet (40 mg total) by mouth daily   gabapentin (NEURONTIN) 300 mg capsule   No No   Sig: Take 1 capsule (300 mg total) by mouth 3 (three) times a day   levothyroxine 100 mcg tablet   Yes No   Sig: Take 100 mcg by mouth daily   omeprazole (PriLOSEC) 20 mg delayed release capsule   Yes No   Sig: Take 1 capsule by mouth daily    oseltamivir (TAMIFLU) 75 mg capsule   Yes No   Sig: Take 75 mg by mouth daily   potassium chloride (K-DUR,KLOR-CON) 10 mEq tablet   No No   Sig: Take 1 tablet (10 mEq total) by mouth 2 (two) times a day   pramipexole (MIRAPEX) 0 5 mg tablet   Yes No   Sig: Take 0 5 mg by mouth 2 (two) times a day     topiramate (TOPAMAX) 25 mg tablet   Yes No   Sig: Take 25 mg by mouth daily at bedtime    topiramate (TOPAMAX) 50 MG tablet   Yes No   traMADol (ULTRAM) 50 mg tablet   Yes No   Sig: Take 50 mg by mouth 2 (two) times a day as needed for moderate pain   urea (CARMOL) 40 % ointment   Yes No   Sig: Apply topically      Facility-Administered Medications: None       Past Medical History:   Diagnosis Date    Bulging of cervical intervertebral disc     Crohn disease ([facility] [address] )     Disease of thyroid gland     Migraines     Neck pain, chronic     Polycystic ovarian syndrome     PONV (postoperative nausea and vomiting)     TMJ (temporomandibular joint syndrome)        Past Surgical History:   Procedure Laterality Date    ADENOIDECTOMY      ANKLE SURGERY Left     reconstruction     SECTION      x2    DIAGNOSTIC LAPAROSCOPY      HYSTERECTOMY      ND ARTHRODESIS ANT INTERBODY INC DISCECTOMY, CERVICAL BELOW C2 Bilateral 2019    Procedure: C5/6 ANTERIOR CERVICAL DECOMPRESSION AND FUSION;  Surgeon: Pedro Smith MD;  Location: [facility];  Service: Neurosurgery    TONSILLECTOMY      TUBAL LIGATION         History reviewed  No pertinent family history  I have reviewed and agree with the history as documented  E-Cigarette/Vaping    E-Cigarette Use Never User      E-Cigarette/Vaping Substances     Social History     Tobacco Use    Smoking status: Current Every Day Smoker     Packs/day: 1 00     Types: Cigarettes    Smokeless tobacco: Never Used   Substance Use Topics    Alcohol use: No    Drug use: Never       Review of Systems   Gastrointestinal: Positive for abdominal pain and nausea  All other systems reviewed and are negative        Physical Exam  Physical Exam   Constitutional: She is oriented to person, place, and time  She appears well-developed and well-nourished  She appears distressed  HENT:   Head: Normocephalic and atraumatic  Right Ear: External ear normal    Left Ear: External ear normal    Nose: Nose normal    Mouth/Throat: Oropharynx is clear and moist    Eyes: Pupils are equal, round, and reactive to light  Conjunctivae and EOM are normal    Neck: Normal range of motion  Cardiovascular: Normal rate, regular rhythm, normal heart sounds and intact distal pulses  Exam reveals no gallop and no friction rub  No murmur heard  Pulmonary/Chest: Effort normal and breath sounds normal  No stridor  No respiratory distress  She has no wheezes  She has no rales  Abdominal: Soft  Bowel sounds are normal  She exhibits no distension  There is generalized tenderness  There is guarding  Generalized abdominal tenderness out of proportion with exam    Musculoskeletal: Normal range of motion  She exhibits no tenderness  Neurological: She is alert and oriented to person, place, and time  Skin: Skin is warm  Capillary refill takes less than 2 seconds  She is not diaphoretic  Nursing note and vitals reviewed        Vital Signs  ED Triage Vitals   Temperature Pulse Respirations Blood Pressure SpO2   03/10/20 1745 03/10/20 1745 03/10/20 1745 03/10/20 1745 03/10/20 1745   97 9 °F (36 6 °C) 85 20 140/80 98 %      Temp Source Heart Rate Source Patient Position - Orthostatic VS BP Location FiO2 (%)   03/10/20 1745 03/10/20 2045 03/10/20 2045 03/10/20 1745 --   Oral Monitor Lying Left arm       Pain Score       03/10/20 1844       Worst Possible Pain           Vitals:    03/10/20 1745 03/10/20 2045   BP: 140/80 122/60   Pulse: 85 61   Patient Position - Orthostatic VS:  Lying         Visual Acuity      ED Medications  Medications   dextrose 5 % and sodium chloride 0 45 % with KCl 20 mEq/L infusion (has no administration in time range)   ondansetron (ZOFRAN) injection 4 mg (has no administration in time range) nicotine (NICODERM CQ) 21 mg/24 hr TD 24 hr patch 1 patch (has no administration in time range)   enoxaparin (LOVENOX) subcutaneous injection 40 mg (has no administration in time range)   sucralfate (CARAFATE) oral suspension 1,000 mg (has no administration in time range)   pantoprazole (PROTONIX) injection 40 mg (has no administration in time range)   sodium chloride 0 9 % bolus 1,000 mL (1,000 mL Intravenous New Bag 3/10/20 1837)   morphine (PF) 10 mg/mL injection 8 mg (8 mg Intravenous Given 3/10/20 1844)   famotidine (PEPCID) injection 20 mg (20 mg Intravenous Given 3/10/20 1844)   ondansetron (ZOFRAN) injection 4 mg (4 mg Intravenous Given 3/10/20 1839)   iohexol (OMNIPAQUE) 350 MG/ML injection (MULTI-DOSE) 100 mL (100 mL Intravenous Given 3/10/20 1936)       Diagnostic Studies  Results Reviewed     Procedure Component Value Units Date/Time    Basic metabolic panel [[id number]]  (Abnormal) Collected:  03/10/20 1830    Lab Status:  Final result Specimen:  Blood from Arm, Right Updated:  03/10/20 1857     Sodium 139 mmol/L      Potassium 4 0 mmol/L      Chloride 110 mmol/L      CO2 21 mmol/L      ANION GAP 8 mmol/L      BUN 10 mg/dL      Creatinine 0 92 mg/dL      Glucose 110 mg/dL      Calcium 9 3 mg/dL      eGFR 74 ml/min/1 73sq m     Narrative:       [place] guidelines for Chronic Kidney Disease (CKD):     Stage 1 with normal or high GFR (GFR > 90 mL/min/1 73 square meters)    Stage 2 Mild CKD (GFR = 60-89 mL/min/1 73 square meters)    Stage 3A Moderate CKD (GFR = 45-59 mL/min/1 73 square meters)    Stage 3B Moderate CKD (GFR = 30-44 mL/min/1 73 square meters)    Stage 4 Severe CKD (GFR = 15-29 mL/min/1 73 square meters)    Stage 5 End Stage CKD (GFR <15 mL/min/1 73 square meters)  Note: GFR calculation is accurate only with a steady state creatinine    Lipase [[id number]]  (Normal) Collected:  03/10/20 1830    Lab Status:  Final result Specimen:  Blood from Arm, Right Updated: 03/10/20 1857     Lipase 14 u/L     Hepatic function panel [[id number]]  (Abnormal) Collected:  03/10/20 1830    Lab Status:  Final result Specimen:  Blood from Arm, Right Updated:  03/10/20 1857     Total Bilirubin 0 60 mg/dL      Bilirubin, Direct 0 10 mg/dL      Alkaline Phosphatase 45 U/L      AST 8 U/L      ALT 9 U/L      Total Protein 6 9 g/dL      Albumin 4 4 g/dL     CBC and differential [[id number]]  (Abnormal) Collected:  03/10/20 1830    Lab Status:  Final result Specimen:  Blood from Arm, Right Updated:  03/10/20 1837     WBC 13 50 Thousand/uL      RBC 4 68 Million/uL      Hemoglobin 13 6 g/dL      Hematocrit 41 8 %      MCV 89 fL      MCH 29 0 pg      MCHC 32 5 g/dL      RDW 13 5 %      MPV 6 9 fL      Platelets 921 Thousands/uL      Neutrophils Relative 82 %      Lymphocytes Relative 12 %      Monocytes Relative 5 %      Eosinophils Relative 2 %      Basophils Relative 0 %      Neutrophils Absolute 11 10 Thousands/µL      Lymphocytes Absolute 1 60 Thousands/µL      Monocytes Absolute 0 70 Thousand/µL      Eosinophils Absolute 0 20 Thousand/µL      Basophils Absolute 0 00 Thousands/µL                  CT abdomen pelvis with contrast   Final Result by Randal Ding MD (03/10 1953)         1  Appendix appears thickened measuring approximately 1 cm with periappendiceal fatty infiltration and some fluid inferior to the cecum  These findings are suspicious for possible acute appendicitis  2  The swallowed capsule lies within the descending colon  3  Hepatomegaly  I personally discussed this study with Hemalatha Keen on 3/10/2020 at 7:52 PM                      Workstation performed: [id number]         XR ABDOMEN 1 VIEW KUB    (Results Pending)              Procedures  Procedures         ED Course                               MDM  Number of Diagnoses or Management Options  Diagnosis management comments: CT concerning for acute appendicitis  Video capsule identified in cecum    Case discussed with Dr Rosalinda Khanna from general surgery  Patient to be admitted for observation and further management  Amount and/or Complexity of Data Reviewed  Clinical lab tests: ordered and reviewed  Tests in the radiology section of CPT®: ordered and reviewed  Discuss the patient with other providers: yes (Dr Rosalinda Khanna)  Independent visualization of images, tracings, or specimens: yes    Risk of Complications, Morbidity, and/or Mortality  Presenting problems: moderate  Diagnostic procedures: moderate  Management options: moderate    Patient Progress  Patient progress: stable        Disposition  Final diagnoses:   Abdominal pain     Time reflects when diagnosis was documented in both MDM as applicable and the Disposition within this note     Time User Action Codes Description Comment    3/10/2020  8:49 PM Chadwick Law [R10 13] Epigastric pain     3/10/2020  9:54 PM Ashley Krueger [R10 9] Abdominal pain       ED Disposition     ED Disposition Condition Date/Time Comment    Admit Stable Tue Mar 10, 2020  9:54 PM Case was discussed with Dr Rosalinda Khanna and the patient's admission status was agreed to be Admission Status: observation status to the service of Dr Rosalinda Khanna   Follow-up Information    None         Patient's Medications   Discharge Prescriptions    No medications on file     No discharge procedures on file      PDMP Review     None          ED Provider  Electronically Signed by           Jeff Nicholas PA-C  03/10/20 1190

## 2020-03-11 LAB
ANION GAP SERPL CALCULATED.3IONS-SCNC: 6 MMOL/L (ref 4–13)
BUN SERPL-MCNC: 8 MG/DL (ref 7–25)
CALCIUM SERPL-MCNC: 8.4 MG/DL (ref 8.6–10.5)
CHLORIDE SERPL-SCNC: 111 MMOL/L (ref 98–107)
CO2 SERPL-SCNC: 20 MMOL/L (ref 21–31)
CREAT SERPL-MCNC: 0.89 MG/DL (ref 0.6–1.2)
ERYTHROCYTE [DISTWIDTH] IN BLOOD BY AUTOMATED COUNT: 13.2 % (ref 11.5–14.5)
GFR SERPL CREATININE-BSD FRML MDRD: 77 ML/MIN/1.73SQ M
GLUCOSE SERPL-MCNC: 105 MG/DL (ref 65–99)
HCT VFR BLD AUTO: 36.3 % (ref 42–47)
HGB BLD-MCNC: 12.2 G/DL (ref 12–16)
MCH RBC QN AUTO: 30 PG (ref 26–34)
MCHC RBC AUTO-ENTMCNC: 33.5 G/DL (ref 31–37)
MCV RBC AUTO: 90 FL (ref 81–99)
PLATELET # BLD AUTO: 194 THOUSANDS/UL (ref 149–390)
PMV BLD AUTO: 7.2 FL (ref 8.6–11.7)
POTASSIUM SERPL-SCNC: 3.9 MMOL/L (ref 3.5–5.5)
RBC # BLD AUTO: 4.05 MILLION/UL (ref 3.9–5.2)
SODIUM SERPL-SCNC: 137 MMOL/L (ref 134–143)
WBC # BLD AUTO: 8.2 THOUSAND/UL (ref 4.8–10.8)

## 2020-03-11 PROCEDURE — 99232 SBSQ HOSP IP/OBS MODERATE 35: CPT | Performed by: SPECIALIST

## 2020-03-11 PROCEDURE — 85027 COMPLETE CBC AUTOMATED: CPT | Performed by: SPECIALIST

## 2020-03-11 PROCEDURE — C9113 INJ PANTOPRAZOLE SODIUM, VIA: HCPCS | Performed by: SPECIALIST

## 2020-03-11 PROCEDURE — 80048 BASIC METABOLIC PNL TOTAL CA: CPT | Performed by: SPECIALIST

## 2020-03-11 RX ORDER — ACETAMINOPHEN 325 MG/1
650 TABLET ORAL EVERY 6 HOURS PRN
Status: DISCONTINUED | OUTPATIENT
Start: 2020-03-11 | End: 2020-03-12 | Stop reason: HOSPADM

## 2020-03-11 RX ORDER — PRAMIPEXOLE DIHYDROCHLORIDE 0.12 MG/1
0.5 TABLET ORAL 2 TIMES DAILY
Status: DISCONTINUED | OUTPATIENT
Start: 2020-03-11 | End: 2020-03-11

## 2020-03-11 RX ORDER — TOPIRAMATE 25 MG/1
25 TABLET ORAL 2 TIMES DAILY
Status: DISCONTINUED | OUTPATIENT
Start: 2020-03-11 | End: 2020-03-12 | Stop reason: HOSPADM

## 2020-03-11 RX ORDER — PRAMIPEXOLE DIHYDROCHLORIDE 0.12 MG/1
0.5 TABLET ORAL 2 TIMES DAILY
Status: DISCONTINUED | OUTPATIENT
Start: 2020-03-11 | End: 2020-03-12 | Stop reason: HOSPADM

## 2020-03-11 RX ORDER — TRAMADOL HYDROCHLORIDE 50 MG/1
50 TABLET ORAL EVERY 6 HOURS PRN
Status: DISCONTINUED | OUTPATIENT
Start: 2020-03-11 | End: 2020-03-12 | Stop reason: HOSPADM

## 2020-03-11 RX ORDER — LEVOTHYROXINE SODIUM 0.1 MG/1
100 TABLET ORAL
Status: DISCONTINUED | OUTPATIENT
Start: 2020-03-11 | End: 2020-03-12 | Stop reason: HOSPADM

## 2020-03-11 RX ORDER — TOPIRAMATE 25 MG/1
25 TABLET ORAL
Status: DISCONTINUED | OUTPATIENT
Start: 2020-03-11 | End: 2020-03-11

## 2020-03-11 RX ADMIN — ONDANSETRON 4 MG: 2 INJECTION INTRAMUSCULAR; INTRAVENOUS at 05:40

## 2020-03-11 RX ADMIN — PRAMIPEXOLE DIHYDROCHLORIDE 0.5 MG: 0.12 TABLET ORAL at 15:29

## 2020-03-11 RX ADMIN — DEXTROSE, SODIUM CHLORIDE, AND POTASSIUM CHLORIDE 125 ML/HR: 5; .45; .15 INJECTION INTRAVENOUS at 15:32

## 2020-03-11 RX ADMIN — TRAMADOL HYDROCHLORIDE 50 MG: 50 TABLET, FILM COATED ORAL at 15:19

## 2020-03-11 RX ADMIN — MORPHINE SULFATE 2 MG: 2 INJECTION, SOLUTION INTRAMUSCULAR; INTRAVENOUS at 02:26

## 2020-03-11 RX ADMIN — PANTOPRAZOLE SODIUM 40 MG: 40 INJECTION, POWDER, LYOPHILIZED, FOR SOLUTION INTRAVENOUS at 08:15

## 2020-03-11 RX ADMIN — ACETAMINOPHEN 650 MG: 325 TABLET ORAL at 20:34

## 2020-03-11 RX ADMIN — ENOXAPARIN SODIUM 40 MG: 40 INJECTION SUBCUTANEOUS at 08:14

## 2020-03-11 RX ADMIN — DEXTROSE, SODIUM CHLORIDE, AND POTASSIUM CHLORIDE 125 ML/HR: 5; .45; .15 INJECTION INTRAVENOUS at 08:18

## 2020-03-11 RX ADMIN — SUCRALFATE 1000 MG: 1 SUSPENSION ORAL at 19:46

## 2020-03-11 RX ADMIN — SUCRALFATE 1000 MG: 1 SUSPENSION ORAL at 15:19

## 2020-03-11 RX ADMIN — SUCRALFATE 1000 MG: 1 SUSPENSION ORAL at 05:39

## 2020-03-11 RX ADMIN — ONDANSETRON 4 MG: 2 INJECTION INTRAMUSCULAR; INTRAVENOUS at 20:33

## 2020-03-11 RX ADMIN — SUCRALFATE 1000 MG: 1 SUSPENSION ORAL at 23:06

## 2020-03-11 RX ADMIN — DEXTROSE, SODIUM CHLORIDE, AND POTASSIUM CHLORIDE 125 ML/HR: 5; .45; .15 INJECTION INTRAVENOUS at 23:06

## 2020-03-11 RX ADMIN — TOPIRAMATE 25 MG: 25 TABLET, FILM COATED ORAL at 19:46

## 2020-03-11 RX ADMIN — TRAMADOL HYDROCHLORIDE 50 MG: 50 TABLET, FILM COATED ORAL at 08:12

## 2020-03-11 RX ADMIN — NICOTINE 1 PATCH: 21 PATCH, EXTENDED RELEASE TRANSDERMAL at 08:13

## 2020-03-11 RX ADMIN — LEVOTHYROXINE SODIUM 100 MCG: 100 TABLET ORAL at 08:45

## 2020-03-11 NOTE — CONSULTS
Consultation - GI   Favian Gould 52 y o  female MRN: [id number]  Unit/Bed#: -01 Encounter: [id number]      Assessment/Plan     Assessment:  Abdominal pains  Crohn's disease of the small bowel  Appendicitis  Erosive esophagitis  Plan:  Patient will probably need appendicectomy for acute appendicitis  However the white count is normal but she has refused tenderness in the right lower quadrant area and complains of abdominal pains  Once this acute phase is resolved will treat her for Crohn's disease  She needs to take her PPIs for her erosive esophagitis and GERD  History of Present Illness   Physician Requesting Consult: Merlyn Peguero MD  Reason for Consult / Principal Problem:  Abdominal pains  Hx and PE limited by:   HPI: Favian Gould is a 52y o  year old female who presents with abdominal pains  Patient has a history of erosive esophagitis and GERD  She also had heme-positive stools and was being worked up for [place] disease  She had an EGD and a colonoscopy  EGD had shown erosive esophagitis and GERD  Colonoscopy did mention some ileitis  A small bowel wireless capsule endoscopy was performed which showed multiple erosions and erythema in the small bowel consistent with Crohn's disease  Patient was in the office to return the equipment for the wireless capsule endoscopy but had been complaining of severe pain  She says the pains were in the epigastric area with anything she ate  She also complained of pain in the right lower quadrant area  She was sent to the emergency room where a CT scan showed 1 cm thickness of the appendix and fatty infiltration of the adjoining cecum consistent with appendicitis  The wireless capsular already passed into the colon at this time  She denies any nausea vomiting eyes any melena or bright red blood per rectum  Denies any hematemesis    Currently she is afebrile but still complains of right lower quadrant pains and right lower quadrant tenderness as well  Consults    Review of Systems    Historical Information   Past Medical History:   Diagnosis Date    Bulging of cervical intervertebral disc     Crohn disease ([address] )     Disease of thyroid gland     Migraines     Neck pain, chronic     Polycystic ovarian syndrome     PONV (postoperative nausea and vomiting)     TMJ (temporomandibular joint syndrome)      Past Surgical History:   Procedure Laterality Date    ADENOIDECTOMY      ANKLE SURGERY Left     reconstruction     SECTION      x2    DIAGNOSTIC LAPAROSCOPY      HYSTERECTOMY      DE ARTHRODESIS ANT INTERBODY INC DISCECTOMY, CERVICAL BELOW C2 Bilateral 2019    Procedure: C5/6 ANTERIOR CERVICAL DECOMPRESSION AND FUSION;  Surgeon: Jasmin Tipton MD;  Location: [facility];  Service: Neurosurgery    TONSILLECTOMY      TUBAL LIGATION       Social History   Social History     Substance and Sexual Activity   Alcohol Use Never    Frequency: Never     Social History     Substance and Sexual Activity   Drug Use Never     E-Cigarette/Vaping    E-Cigarette Use Never User      E-Cigarette/Vaping Substances     Social History     Tobacco Use   Smoking Status Current Every Day Smoker    Packs/day: 1 00    Types: Cigarettes   Smokeless Tobacco Never Used     Family History: non-contributory    Meds/Allergies   all current active meds have been reviewed    Allergies   Allergen Reactions    Amoxicillin Rash    Codeine Rash and Other (See Comments)     Rash    Diclofenac Rash    Penicillins Other (See Comments)     Rash    Sulfa Antibiotics Other (See Comments)     Rash    Diclofenac Sodium Rash    Etodolac Rash and Other (See Comments)     Rash         Objective       Intake/Output Summary (Last 24 hours) at 3/11/2020 1337  Last data filed at 3/11/2020 1051  Gross per 24 hour   Intake 1455 ml   Output --   Net 1455 ml       Invasive Devices:   Peripheral IV 20 Right Forearm (Active)   Site Assessment Clean;Dry; Intact 3/11/2020 10:00 AM   Dressing Type Transparent 3/11/2020 10:00 AM   Line Status Infusing;Flushed 3/11/2020 10:00 AM   Dressing Status Clean;Dry; Intact 3/11/2020 10:00 AM       Physical Exam   Middle-aged white female in no acute distress  Temperature 98 1°  Respiratory rate is 18  Blood pressure is 100/60  Pulse is 71 per minute  BMI is 39 65  HEENT anicteric sclera neck supple no JVD  Chest lungs clear to auscultation  Heart rate rhythm regular S1 andS2  Abdomen soft positive epigastric and right lower quadrant tenderness no rebound tenderness or guarding  Extremities no edema cyanosis or clubbing  Neuro awake alert oriented x3 cranial nerves 2-12 intact grossly  Labs show a hemoglobin of 12 2 hematocrit of 36 3%  Normal white count  CT scan shows appendix thickened to 1 cm with fatty infiltration in the adjoining cecum consistent with appendicitis  Fatty liver  Hepatomegaly  Capsule is already in the colon  Wireless capsule endoscopy had shown erosive duodenitis as well as erosions in the small bowel and ileum  Lab Results: I have personally reviewed pertinent reports  Imaging Studies: I have personally reviewed pertinent reports  EKG, Pathology, and Other Studies:     VTE Prophylaxis:     Counseling / Coordination of Care  Total floor / unit time spent today 60 minutes  Greater than 50% of total time was spent with the patient and / or family counseling and / or coordination of care  A description of the counseling / coordination of care:  Discussed this with the attending physician  Patient may need an appendectomy for acute appendicitis  She does have Crohn's disease of the small bowel and will need treatment for that  Most likely biological   Will address that when she is discharged and will treat as an outpatient  Meanwhile she needs to continue her PPIs for erosive esophagitis and reflux

## 2020-03-11 NOTE — SOCIAL WORK
Evaluated the pt at the bedside  Pt was admitted to the hospital with epigastric pain  Explained the role of CM and the options of discharge planning with the pt  Pt lives with her SO in a 2 story home with 3 KIKO and 14 steps to the upstairs  Pt has a bathroom on both floors  Pt indicated she is totally independent and drives  Pt has no DME at home  Pt PCP is Dr Coral Velazquez  Pt utilizes [employer] [facility] [place]  Pt has a pending GI consult  Pt reports her SO will transport her home upon discharge  Explained the Observation  Level of care with the pt who verbalized understanding of same  Patient/caregiver received discharge checklist   Content reviewed  Patient/caregiver encouraged to participate in discharge plan of care prior to discharge home  CM reviewed d/c planning process including the following: identifying help at home, patient preference for d/c planning needs, availability of treatment team to discuss questions or concerns patient and/or family may have regarding understanding medications and recognizing signs and symptoms once discharged  CM also encouraged patient to follow up with all recommended appointments after discharge  Patient advised of importance for patient and family to participate in managing patients medical well being

## 2020-03-11 NOTE — PLAN OF CARE
Problem: DISCHARGE PLANNING - CARE MANAGEMENT  Goal: Discharge to post-acute care or home with appropriate resources  Description  INTERVENTIONS:  - Conduct assessment to determine patient/family and health care team treatment goals, and need for post-acute services based on payer coverage, community resources, and patient preferences, and barriers to discharge  - Address psychosocial, clinical, and financial barriers to discharge as identified in assessment in conjunction with the patient/family and health care team  - Arrange appropriate level of post-acute services according to patient's   needs and preference and payer coverage in collaboration with the physician and health care team  - Communicate with and update the patient/family, physician, and health care team regarding progress on the discharge plan  - Arrange appropriate transportation to post-acute venues  Pt will be discharged home with SO who will transport  Pt will have no needs upon discharge     Outcome: Progressing

## 2020-03-11 NOTE — UTILIZATION REVIEW
Initial Clinical Review  Observation 3/10/20 @ 2053, converted to inpatient admission 3/11/20 @ [phone] for continued care & tx for epigastric pain  Per MD:  Persistent epigastric pain with tenderness palpation rlq  Using iv morphine & iv zofran for pain & nausea respectively  Still no BM  KUB shows the Video capsule still hasn't moved, appears to be in the RLQ, probably in the terminal ileum If the video capsule does not pass into the colon over the next day or so, may need laparotomy with enterotomy vs bowel resection to retrieve  Observe for symptoms of obstruction or perforation  Scheduled for follow-up KUB  Per GI: crohn's disease of small bowel, appendicitis, erosive esophagitis  Continue iv PPI, carafate for erosive esophagitis & gerd, ivf maintained  Plan to treat crohn's when acute phase of illness resolved  Admitting Physician Daylin Bliss    Level of Care Med Surg    Estimated length of stay More than 2 Midnights    Certification I certify that inpatient services are medically necessary for this patient for a duration of greater than two midnights  See H&P and MD Progress Notes for additional information about the patient's course of treatment            Order History   Inpatient   Date/Time Action Taken User   03/11/20 1433 Release Adela Jorge MD (auto-released)   03/11/20 1433 Complete Adela Jorge MD     ED Arrival Information     Expected Arrival Acuity Means of Arrival Escorted By Service Admission Type    - 3/10/2020 16:39 Urgent Walk-In [facility] Surgery-General Urgent    Arrival Complaint    abdominal pain        Chief Complaint   Patient presents with    Abdominal Pain     According to the patient, she had swallowed abdominal capsule for endoscopy starting this morning     Assessment/Plan:   45-year-old female presents emergency department complaining of severe epigastric abdominal pain since approximately 9:00 a m  This morning    She was being evaluated by her gastroenterologist by doing a video capsule swallow study  Shortly after beginning the study she reported severe of the gastric pains  She complains of nausea generalized abdominal pain, severe tenderness, distention, bloating  Pain is causing significant distress  She is bent over clutching her abdomen  The Gastroenterology office call the emergency department expressed concern for the location of the capsule  They also stated that she may have erosive duodenitis and ileitis      ED Triage Vitals   Temperature Pulse Respirations Blood Pressure SpO2   03/10/20 1745 03/10/20 1745 03/10/20 1745 03/10/20 1745 03/10/20 1745   97 9 °F (36 6 °C) 85 20 140/80 98 %      Temp Source Heart Rate Source Patient Position - Orthostatic VS BP Location FiO2 (%)   03/10/20 1745 03/10/20 2045 03/10/20 2045 03/10/20 1745 --   Oral Monitor Lying Left arm       Pain Score       03/10/20 1844       Worst Possible Pain        Wt Readings from Last 1 Encounters:   03/10/20 125 kg (276 lb 5 6 oz)     Additional Vital Signs:   Date/Time  Temp  Pulse  Resp  BP  MAP (mmHg)  SpO2  O2 Device  Patient Position - Orthostatic VS   03/11/20 0742  98 1 °F (36 7 °C)  71  18  96/55  --  98 %  None (Room air)  Lying   03/10/20 2307  98 6 °F (37 °C)  57  18  124/59  --  97 %  None (Room air)  Lying   03/10/20 2205  98 7 °F (37 1 °C)  63  18  117/58  --  99 %  None (Room air)  Sitting   03/10/20 2045  --  61  18  122/60  85  98 %  None (Room air)  Lying   03/10/20 1745  97 9 °F (36 6 °C)  85  20  140/80  --  98 %  --  --   Pertinent Labs/Diagnostic Test Results:   Results from last 7 days   Lab Units 03/11/20  0546 03/10/20  1830   WBC Thousand/uL 8 20 13 50*   HEMOGLOBIN g/dL 12 2 13 6   HEMATOCRIT % 36 3* 41 8*   PLATELETS Thousands/uL 194 245   NEUTROS ABS Thousands/µL  --  11 10*     Results from last 7 days   Lab Units 03/11/20  0546 03/10/20  1830   SODIUM mmol/L 137 139   POTASSIUM mmol/L 3 9 4 0   CHLORIDE mmol/L 111* 110*   CO2 mmol/L 20* 21 ANION GAP mmol/L 6 8   BUN mg/dL 8 10   CREATININE mg/dL 0 89 0 92   EGFR ml/min/1 73sq m 77 74   CALCIUM mg/dL 8 4* 9 3     Results from last 7 days   Lab Units 03/10/20  1830   AST U/L 8*   ALT U/L 9   ALK PHOS U/L 45   TOTAL PROTEIN g/dL 6 9   ALBUMIN g/dL 4 4   TOTAL BILIRUBIN mg/dL 0 60   BILIRUBIN DIRECT mg/dL 0 10     Results from last 7 days   Lab Units 03/11/20  0546 03/10/20  1830   GLUCOSE RANDOM mg/dL 105* 110*     Results from last 7 days   Lab Units 03/10/20  1830   LIPASE u/L 14     3/10  Ct a/p=1  Appendix appears thickened measuring approximately 1 cm with periappendiceal fatty infiltration and some fluid inferior to the cecum  These findings are suspicious for possible acute appendicitis  2  The swallowed capsule lies within the descending colon  3  Hepatomegaly  Kub= Endoscopic capsule projects over the right lower quadrant likely in the base of the cecum versus ileocecal valve  Retained intravenous contrast in the bilateral collecting systems, ureters, and bladder    ED Treatment:   Medication Administration from 03/10/2020 1639 to 03/10/2020 2201       Date/Time Order Dose Route Action     03/10/2020 1837 sodium chloride 0 9 % bolus 1,000 mL 1,000 mL Intravenous New Bag     03/10/2020 1844 morphine (PF) 10 mg/mL injection 8 mg 8 mg Intravenous Given     03/10/2020 1844 famotidine (PEPCID) injection 20 mg 20 mg Intravenous Given     03/10/2020 1839 ondansetron (ZOFRAN) injection 4 mg 4 mg Intravenous Given     03/10/2020 1936 iohexol (OMNIPAQUE) 350 MG/ML injection (MULTI-DOSE) 100 mL 100 mL Intravenous Given        Past Medical History:   Diagnosis Date    Bulging of cervical intervertebral disc     Cro[address] [address] )     Disease of thyroid gland     Migraines     Neck pain, chronic     Polycystic ovarian syndrome     PONV (postoperative nausea and vomiting)     TMJ (temporomandibular joint syndrome)      Present on Admission:   Epigastric pain  Admitting Diagnosis: Epigastric pain [R10 13]  Abdominal pain [R10 9]  Age/Sex: 52 y o  female  Admission Orders:  Consult GI  scd  Scheduled Medications:  acetaminophen 650 mg Oral Q6H PRN   enoxaparin 40 mg Subcutaneous Daily   levothyroxine 100 mcg Oral Early Morning   morphine injection 2 mg Intravenous Q2H PRN-used x2/24hrs   nicotine 1 patch Transdermal Daily   ondansetron 4 mg Intravenous Q6H PRN-used x1   pantoprazole 40 mg Intravenous Q24H JOIE   pramipexole 0 5 mg Oral BID   sucralfate 1,000 mg Oral Q6H JOIE   topiramate 25 mg Oral BID   traMADol 50 mg Oral Q6H PRN     Continuous IV Infusions:  dextrose 5 % and sodium chloride 0 45 % with KCl 20 mEq/L 125 mL/hr Intravenous Continuous     Network Utilization Review Department  [email or web address]  ATTENTION: Please call with any questions or concerns to [phone] and carefully listen to the prompts so that you are directed to the right person  All voicemails are confidential   Emilie Andrew all requests for admission clinical reviews, approved or denied determinations and any other requests to dedicated fax number below belonging to the campus where the patient is receiving treatment   List of dedicated fax numbers for the Facilities:  [address] [place] [address] DENIALS (Administrative/Medical Necessity) [phone]   [address] (Maternity/NICU/Pediatrics) [phone]   [place] Notice [phone]   [place] [phone]   Slade Robins [phone]   José Manuel Vinson [phone]   [address] [facility] [address] [facility] [phone]   [facility]  [phone]   [address] [facility] [place] [address]  [address] [facility] [address] [facility] [phone]

## 2020-03-11 NOTE — PROGRESS NOTES
Progress Note - General Surgery   Vazquez De La Rosa 52 y o  female MRN: [id number]  Unit/Bed#: -01 Encounter: [id number]    Assessment:  Patient reports she feels about the same, although her epigastric discomfort is improved  No nausea or vomiting  Tolerating Clear liquids  WBC is normal  Tramadol is sufficient for pain, declines Morphine  Still no BM  KUB shows the Video capsule still hasn't moved, appears to be in the RLQ, probably in the terminal ileum  Plan:  Fading suspicion for acute appendicitis  If the video capsule does not pass into the colon over the next day or so, may need laparotomy with enterotomy vs bowel resection to retrieve  Observe for symptoms of obstruction or perforation  Continue carafate plus protonix  GI consultation requested  Subjective/Objective   Chief Complaint: Vague complaints of abdominal discomfort    Subjective: Appears comfortable at rest     Objective:     Blood pressure 96/55, pulse 71, temperature 98 1 °F (36 7 °C), temperature source Tympanic, resp  rate 18, height 5' 10" (1 778 m), weight 125 kg (276 lb 5 6 oz), SpO2 98 %  ,Body mass index is 39 65 kg/m²  Intake/Output Summary (Last 24 hours) at 3/11/2020 1154  Last data filed at 3/11/2020 1051  Gross per 24 hour   Intake 1455 ml   Output --   Net 1455 ml       Invasive Devices     Peripheral Intravenous Line            Peripheral IV 03/11/20 Right Forearm less than 1 day                Physical Exam:   Alert, appears comfortable  Abd:  Obese, soft with normal bowel sounds, less discomfort in epigastrium with palpation, mild discomfort to fairly deep palpation in RLQ  Lab, Imaging and other studies:  I have personally reviewed pertinent lab results    , CBC:   Lab Results   Component Value Date    WBC 8 20 03/11/2020    HGB 12 2 03/11/2020    HCT 36 3 (L) 03/11/2020    MCV 90 03/11/2020     03/11/2020    MCH 30 0 03/11/2020    MCHC 33 5 03/11/2020    RDW 13 2 03/11/2020    MPV 7 2 (L) 03/11/2020 , CMP:   Lab Results   Component Value Date    SODIUM 137 03/11/2020    K 3 9 03/11/2020     (H) 03/11/2020    CO2 20 (L) 03/11/2020    BUN 8 03/11/2020    CREATININE 0 89 03/11/2020    CALCIUM 8 4 (L) 03/11/2020    AST 8 (L) 03/10/2020    ALT 9 03/10/2020    ALKPHOS 45 03/10/2020    EGFR 77 03/11/2020   ABDOMEN     INDICATION:   Position of capsule, Re:  capsule endoscopy      COMPARISON:  CT abdomen pelvis 3/10/2020     VIEWS:  AP supine  Images: 3     FINDINGS:     There is a nonobstructive bowel gas pattern  There is retained intravenous contrast in the bilateral collecting systems, ureters, and bladder is post recent enhanced abdominal CT      No discernible free air on this supine study  Upright or left lateral decubitus imaging is more sensitive to detect subtle free air in the appropriate setting      No pathologic calcifications or soft tissue masses    The metallic capsule projects over the right lower quadrant likely the base of the cecum versus ileocecal valve      Visualized lung bases are clear      Visualized osseous structures are unremarkable for the patient's age      IMPRESSION:     Endoscopic capsule projects over the right lower quadrant likely in the base of the cecum versus ileocecal valve      Retained intravenous contrast in the bilateral collecting systems, ureters, and bladder            VTE Pharmacologic Prophylaxis: Enoxaparin (Lovenox)  VTE Mechanical Prophylaxis: sequential compression device

## 2020-03-11 NOTE — NURSING NOTE
LM for Dr Humphrey Hiss  Need to request that he consider ordering prn medication for mild and moderate pain  Patient has a mild headache and her abdominal pain is moderate

## 2020-03-12 ENCOUNTER — APPOINTMENT (INPATIENT)
Dept: RADIOLOGY | Facility: HOSPITAL | Age: 50
DRG: 387 | End: 2020-03-12
Payer: COMMERCIAL

## 2020-03-12 VITALS
WEIGHT: 276.35 LBS | HEIGHT: 70 IN | SYSTOLIC BLOOD PRESSURE: 112 MMHG | TEMPERATURE: 98.1 F | OXYGEN SATURATION: 98 % | BODY MASS INDEX: 39.56 KG/M2 | HEART RATE: 55 BPM | RESPIRATION RATE: 18 BRPM | DIASTOLIC BLOOD PRESSURE: 54 MMHG

## 2020-03-12 PROBLEM — R10.13 EPIGASTRIC PAIN: Status: RESOLVED | Noted: 2020-03-10 | Resolved: 2020-03-12

## 2020-03-12 LAB
BASOPHILS # BLD AUTO: 0 THOUSANDS/ΜL (ref 0–0.1)
BASOPHILS NFR BLD AUTO: 1 % (ref 0–2)
EOSINOPHIL # BLD AUTO: 0.3 THOUSAND/ΜL (ref 0–0.61)
EOSINOPHIL NFR BLD AUTO: 6 % (ref 0–5)
ERYTHROCYTE [DISTWIDTH] IN BLOOD BY AUTOMATED COUNT: 13.4 % (ref 11.5–14.5)
HCT VFR BLD AUTO: 35.4 % (ref 42–47)
HGB BLD-MCNC: 11.6 G/DL (ref 12–16)
LYMPHOCYTES # BLD AUTO: 1.4 THOUSANDS/ΜL (ref 0.6–4.47)
LYMPHOCYTES NFR BLD AUTO: 31 % (ref 21–51)
MCH RBC QN AUTO: 29.5 PG (ref 26–34)
MCHC RBC AUTO-ENTMCNC: 32.7 G/DL (ref 31–37)
MCV RBC AUTO: 90 FL (ref 81–99)
MONOCYTES # BLD AUTO: 0.4 THOUSAND/ΜL (ref 0.17–1.22)
MONOCYTES NFR BLD AUTO: 9 % (ref 2–12)
NEUTROPHILS # BLD AUTO: 2.3 THOUSANDS/ΜL (ref 1.4–6.5)
NEUTS SEG NFR BLD AUTO: 53 % (ref 42–75)
PLATELET # BLD AUTO: 173 THOUSANDS/UL (ref 149–390)
PMV BLD AUTO: 7.4 FL (ref 8.6–11.7)
RBC # BLD AUTO: 3.92 MILLION/UL (ref 3.9–5.2)
WBC # BLD AUTO: 4.4 THOUSAND/UL (ref 4.8–10.8)

## 2020-03-12 PROCEDURE — C9113 INJ PANTOPRAZOLE SODIUM, VIA: HCPCS | Performed by: SPECIALIST

## 2020-03-12 PROCEDURE — 74018 RADEX ABDOMEN 1 VIEW: CPT

## 2020-03-12 PROCEDURE — 99239 HOSP IP/OBS DSCHRG MGMT >30: CPT | Performed by: SURGERY

## 2020-03-12 PROCEDURE — 85025 COMPLETE CBC W/AUTO DIFF WBC: CPT | Performed by: SPECIALIST

## 2020-03-12 PROCEDURE — NC001 PR NO CHARGE: Performed by: SURGERY

## 2020-03-12 RX ORDER — SUCRALFATE ORAL 1 G/10ML
1000 SUSPENSION ORAL
Qty: 420 ML | Refills: 0 | Status: SHIPPED | OUTPATIENT
Start: 2020-03-12 | End: 2020-07-30

## 2020-03-12 RX ADMIN — SUCRALFATE 1000 MG: 1 SUSPENSION ORAL at 12:45

## 2020-03-12 RX ADMIN — ENOXAPARIN SODIUM 40 MG: 40 INJECTION SUBCUTANEOUS at 09:07

## 2020-03-12 RX ADMIN — MORPHINE SULFATE 2 MG: 2 INJECTION, SOLUTION INTRAMUSCULAR; INTRAVENOUS at 05:03

## 2020-03-12 RX ADMIN — SUCRALFATE 1000 MG: 1 SUSPENSION ORAL at 05:02

## 2020-03-12 RX ADMIN — LEVOTHYROXINE SODIUM 100 MCG: 100 TABLET ORAL at 05:02

## 2020-03-12 RX ADMIN — NICOTINE 1 PATCH: 21 PATCH, EXTENDED RELEASE TRANSDERMAL at 09:06

## 2020-03-12 RX ADMIN — PANTOPRAZOLE SODIUM 40 MG: 40 INJECTION, POWDER, LYOPHILIZED, FOR SOLUTION INTRAVENOUS at 09:07

## 2020-03-12 RX ADMIN — DEXTROSE, SODIUM CHLORIDE, AND POTASSIUM CHLORIDE 125 ML/HR: 5; .45; .15 INJECTION INTRAVENOUS at 09:07

## 2020-03-12 RX ADMIN — PRAMIPEXOLE DIHYDROCHLORIDE 0.5 MG: 0.12 TABLET ORAL at 12:45

## 2020-03-12 NOTE — ASSESSMENT & PLAN NOTE
HD#3 with resolving abdominal pain    Clinically improved, minimal pain only with palpation, return of appetite, no nausea/vomiting, passing flatus  Hemodynamically stable, AFVSS x 3 days  Hematologically with normal WBC count  Radiographically KUB showing moderate stool retention, capsule endoscopy in area of cecum in RLQ  Patient clinically, hemodynamically, hematologically stable at this time  She has a questionable diagnosis of appendicitis at admission that appears to be resolving with medical management  Differential to include pain related to her new diagnosis of crohn's disease or constipation noted on x-ray  She is tolerating her diet without recurrent symptoms  She is inquiring about discharge home  Will discuss this case with Dr Masood Mccoy

## 2020-03-12 NOTE — PROGRESS NOTES
Progress Note - Venita Lopez 1970, 52 y o  female MRN: [id number]    Unit/Bed#: -01 Encounter: [id number]    Primary Care Provider: Michelle Ludwig MD   Date and time admitted to hospital: 3/10/2020  5:41 PM        * Epigastric pain  Assessment & Plan  HD#3 with resolving abdominal pain    Clinically improved, minimal pain only with palpation, return of appetite, no nausea/vomiting, passing flatus  Hemodynamically stable, AFVSS x 3 days  Hematologically with normal WBC count  Radiographically KUB showing moderate stool retention, capsule endoscopy in area of cecum in RLQ  Patient clinically, hemodynamically, hematologically stable at this time  She has a questionable diagnosis of appendicitis at admission that appears to be resolving with medical management  Differential to include pain related to her new diagnosis of crohn's disease or constipation noted on x-ray  She is tolerating her diet without recurrent symptoms  She is inquiring about discharge home  Will discuss this case with Dr Paola Stock  Subjective/Objective   Chief Complaint: "I feel better"    Subjective:  Patient reports feeling improved  Nearly back to her baseline  Abdominal pain rated as very mild, located in the right lower quadrant, and only present with deep palpation  No active nausea or vomiting  Tolerating her diet without issues  Passing flatus, no bowel movements since admission  No fevers or chills  No chest pains or difficulty breathing  Inquiring about discharge home  Objective: No overnight events documented  Blood pressure 112/54, pulse 55, temperature 98 1 °F (36 7 °C), temperature source Temporal, resp  rate 18, height 5' 10" (1 778 m), weight 125 kg (276 lb 5 6 oz), SpO2 98 %  ,Body mass index is 39 65 kg/m²        Intake/Output Summary (Last 24 hours) at 3/12/2020 0940  Last data filed at 3/12/2020 0932  Gross per 24 hour   Intake 1720 ml   Output --   Net 1720 ml       Invasive Devices Peripheral Intravenous Line            Peripheral IV 03/10/20 Right Antecubital 2 days    Peripheral IV 03/11/20 Right Forearm 1 day              Physical Exam   Constitutional: She is oriented to person, place, and time  She appears well-developed and well-nourished  No distress  HENT:   Head: Normocephalic and atraumatic  Eyes: Pupils are equal, round, and reactive to light  EOM are normal    Neck: Normal range of motion  Cardiovascular: Normal rate and regular rhythm  No murmur heard  Pulmonary/Chest: Effort normal and breath sounds normal  No respiratory distress  Abdominal: Soft  Bowel sounds are normal  She exhibits no distension  There is tenderness (Very mild, right lower quadrant, no guarding or rigidity  )  There is no guarding  Musculoskeletal: Normal range of motion  Neurological: She is alert and oriented to person, place, and time  Skin: Skin is warm and dry  Capillary refill takes less than 2 seconds  No rash noted  She is not diaphoretic  Psychiatric: She has a normal mood and affect  Her behavior is normal    Vitals reviewed  Lab, Imaging and other studies:  I have personally reviewed pertinent lab results    , CBC:   Lab Results   Component Value Date    WBC 4 40 (L) 03/12/2020    HGB 11 6 (L) 03/12/2020    HCT 35 4 (L) 03/12/2020    MCV 90 03/12/2020     03/12/2020    MCH 29 5 03/12/2020    MCHC 32 7 03/12/2020    RDW 13 4 03/12/2020    MPV 7 4 (L) 03/12/2020     VTE Pharmacologic Prophylaxis: Enoxaparin (Lovenox)  VTE Mechanical Prophylaxis: sequential compression device

## 2020-03-12 NOTE — DISCHARGE INSTR - AVS FIRST PAGE
Follow-up with Dr Benjamin Shukla next week  Follow-up with your Gastroenterologist within the next few weeks to discuss Crohn's disease

## 2020-03-12 NOTE — NURSING NOTE
GIRISH Hein in to see patient  Patient tolerated regular diet without difficulty  Offers no complaints at this time  Call bell and demar within reach, will continue to monitor

## 2020-03-12 NOTE — DISCHARGE SUMMARY
Discharge- Ashley Colon 1970, 52 y o  female MRN: [id number]    Unit/Bed#: -01 Encounter: [id number]    Primary Care Provider: Deonna Pagan MD   Date and time admitted to hospital: 3/10/2020  5:41 PM        * Epigastric pain-resolved as of 3/12/2020  Assessment & Plan  HD#3 with resolving abdominal pain    Clinically improved, minimal pain only with palpation, return of appetite, no nausea/vomiting, passing flatus  Hemodynamically stable, AFVSS x 3 days  Hematologically with normal WBC count  Radiographically KUB showing moderate stool retention, capsule endoscopy in area of cecum in RLQ  Patient clinically, hemodynamically, hematologically stable at this time  She has a questionable diagnosis of appendicitis at admission that appears to be resolving with medical management  Differential to include pain related to her new diagnosis of crohn's disease or constipation noted on x-ray  She is tolerating her diet without recurrent symptoms  She is inquiring about discharge home  Will discuss this case with Dr Masood Mccoy  Patient reports feeling well this afternoon ready for discharge home  Resolved Problems  Date Reviewed: 2/27/2020          Resolved    * (Principal) Epigastric pain 3/12/2020     Resolved by  Kayleigh Bravo PA-C          Admission Date:   Admission Orders (From admission, onward)     Ordered        03/11/20 1433  Inpatient Admission  Once         03/10/20 2053  Place in Observation  Once                     Admitting Diagnosis: Epigastric pain [R10 13]  Abdominal pain [R10 9]    HPI:  Pleasant 72-year-old female undergoing workup by Gastroenterology for chronic abdominal pain admitted to the hospital for concern regarding acute appendicitis  Procedures Performed: No orders of the defined types were placed in this encounter  Summary of Hospital Course:  Patient presented to the ER where we workup included CT scan    CT findings including thickened appendix concerning for possible acute appendicitis  Patient seen and evaluated by general surgeon on call who admitted the patient for initial observation and medical management  Patient convalesced overnight and had improvement of her symptoms hospital day 2 with resolution of her leukocytosis and normal vital signs  She was observed for 1 additional day at which point on hospital day 3 she was seen and examined at the bedside found to be clinically and hemodynamically stable for discharge home  Significant Findings, Care, Treatment and Services Provided:  Observation and supportive care, thickened appendix on CT    Complications:  None    Condition at Discharge: stable         Discharge instructions/Information to patient and family:   See after visit summary for information provided to patient and family  Provisions for Follow-Up Care:  See after visit summary for information related to follow-up care and any pertinent home health orders  PCP: Lolita Rodriguez MD    Disposition: Home    Planned Readmission: No    Discharge Statement   I spent 15 minutes discharging the patient  This time was spent on the day of discharge  I had direct contact with the patient on the day of discharge  Additional documentation is required if more than 30 minutes were spent on discharge  Discharge Medications:  See after visit summary for reconciled discharge medications provided to patient and family

## 2020-03-12 NOTE — ASSESSMENT & PLAN NOTE
HD#3 with resolving abdominal pain    Clinically improved, minimal pain only with palpation, return of appetite, no nausea/vomiting, passing flatus  Hemodynamically stable, AFVSS x 3 days  Hematologically with normal WBC count  Radiographically KUB showing moderate stool retention, capsule endoscopy in area of cecum in RLQ  Patient clinically, hemodynamically, hematologically stable at this time  She has a questionable diagnosis of appendicitis at admission that appears to be resolving with medical management  Differential to include pain related to her new diagnosis of crohn's disease or constipation noted on x-ray  She is tolerating her diet without recurrent symptoms  She is inquiring about discharge home  Will discuss this case with Dr Sherren Kitten

## 2020-03-12 NOTE — DISCHARGE INSTRUCTIONS
Crohn Disease   WHAT YOU NEED TO KNOW:   What is Crohn disease? Crohn disease is an inflammatory disease of the digestive system  Crohn disease causes the lining of your intestines to become inflamed  The lining of your mouth, esophagus, or stomach may also be affected by Crohn disease  What causes Crohn disease? It is not known exactly what causes Crohn disease  A family history of Crohn disease increases your risk  Your immune system may overreact to bacteria or a virus in the digestive tract and cause inflammation and injury  Smoking also increases your risk for Crohn disease  What are the signs and symptoms of Crohn disease? You may have different symptoms at different times  Your symptoms may come and go with quiet and active periods  Over time, active periods may occur more often and symptoms may be more severe  The most common signs and symptoms include the following:  · Cramping pain on the lower right side of your abdomen    · Diarrhea that may be dark or tar-colored, or blood in your bowel movements    · Fever    · More tired than usual    · Loss of appetite, losing weight without trying, or slow growth in children    · Nausea  How is Crohn disease diagnosed? · Blood tests  may be needed to check for infection or health problems caused by Crohn disease, such as low iron levels  · A bowel movement sample  may show if bacteria are causing your illness  · A colonoscopy  is a test that is done to look at your colon  A tube with a light on the end will be put into your anus, and then moved forward into your colon  · A barium enema  is an x-ray of the colon  A tube is put into your anus, and a liquid called barium is put through the tube  Barium is used so that your healthcare provider can see your colon better  · A barium swallow  is an x-ray of your throat and esophagus  This test may also be called a barium esophagram  You will drink a thick liquid called barium   Barium helps your esophagus and stomach show up better on x-rays  Follow the instructions of your healthcare provider before and after the test     · An endoscopy  is a test that uses a scope to see the inside of your digestive tract, including the esophagus and stomach  Samples may be taken from your digestive tract and sent to a lab for tests  Bleeding may also be treated during an endoscopy  · MRI or CT  pictures may be taken of your digestive system and other organs  You may be given contrast liquid to help the pictures show up better  Tell the healthcare provider if you have ever had an allergic reaction to contrast liquid  Do not enter the MRI room with anything metal  Metal can cause serious injury  Tell the healthcare provider if you have any metal in or on your body  · An ultrasound  is a test that uses sound waves to look at pictures of your digestive system  How is Crohn disease treated? · Medicines  may be used to decrease inflammation in your digestive tract  You may need antibiotics to treat or prevent an infection and antidiarrheal medicine to decrease diarrhea  Immunosuppressants may also be given to slow your immune system  · Surgery  may be needed to decrease your symptoms or to correct problems such as blockage or bleeding  Your healthcare provider may remove the diseased part of your intestines and reconnect the healthy parts  You may also need to have a colostomy  How can I manage Crohn disease? · Do not smoke  Nicotine and other chemicals in cigarettes and cigars can cause lung damage and increase your risk for Crohn disease  Ask your healthcare provider for information if you currently smoke and need help to quit  E-cigarettes or smokeless tobacco still contain nicotine  Talk to your healthcare provider before you use these products  · Take your medicines exactly as directed  This may help to keep your disease in remission (no symptoms)       · Keep a record of everything you eat and drink   Include any symptoms the food or drink causes or makes worse  You may need to avoid certain foods  Dairy, alcohol, hot spices, and high-fiber foods are common examples of foods that may worsen your symptoms  Your healthcare provider may recommend that you take vitamins or minerals  Always ask your healthcare provider before you take vitamins or nutritional supplements  When should I seek immediate care? · You suddenly have trouble breathing  · You vomit blood, or your vomit looks like coffee grounds  · You have a fast heart rate, fast breathing, or are too dizzy to stand  · You have severe pain in your stomach  When should I contact my healthcare provider? · You have tar-colored bowel movements or you see blood in your bowel movements  · You have a fever or chills  · The pain in your abdomen does not go away or gets worse after you take medicine  · Your abdomen is swollen  · You are losing weight without trying  · You have questions or concerns about your condition or care  CARE AGREEMENT:   You have the right to help plan your care  Learn about your health condition and how it may be treated  Discuss treatment options with your caregivers to decide what care you want to receive  You always have the right to refuse treatment  The above information is an  only  It is not intended as medical advice for individual conditions or treatments  Talk to your doctor, nurse or pharmacist before following any medical regimen to see if it is safe and effective for you  © 2017 [employer] Information is for End User's use only and may not be sold, redistributed or otherwise used for commercial purposes  All illustrations and images included in CareNotes® are the copyrighted property of [employer]  or [employer]

## 2020-03-13 NOTE — UTILIZATION REVIEW
Notification of Discharge  This is a Notification of Discharge from our facility [address]  Please be advised that this patient has been discharge from our facility  Below you will find the admission and discharge date and time including the patients disposition  PRESENTATION DATE: 3/10/2020  5:41 PM  OBS ADMISSION DATE:   IP ADMISSION DATE: 3/11/20 1433   DISCHARGE DATE: 3/12/2020  4:26 PM  DISPOSITION: Home/Self Care Home/Self Care   Admission Orders listed below:  Admission Orders (From admission, onward)     Ordered        03/11/20 1433  Inpatient Admission  Once         03/10/20 2053  Place in Observation  Once                   Please contact the UR Department if additional information is required to close this patient's authorization/case  [address] Utilization Review Department  Main: [phone] x carefully listen to the prompts  All voicemails are confidential   [email or web address]  Send all requests for admission clinical reviews, approved or denied determinations and any other requests to dedicated fax number below belonging to the campus where the patient is receiving treatment   List of dedicated fax numbers:  [address] DENIALS (Administrative/Medical Necessity) [phone]   [address] (Maternity/NICU/Pediatrics) [phone]   [facility] [place] [phone]   Jacques Andrew [phone]   Ranulfo Lowe [phone]   Edward [address] [phone]   [facility]  [phone]   [address] [place] [address], [address]  [address] [facility] [address] [facility] [phone]

## 2020-03-15 ENCOUNTER — APPOINTMENT (EMERGENCY)
Dept: CT IMAGING | Facility: HOSPITAL | Age: 50
End: 2020-03-15

## 2020-03-15 ENCOUNTER — HOSPITAL ENCOUNTER (EMERGENCY)
Facility: HOSPITAL | Age: 50
Discharge: HOME | End: 2020-03-15
Attending: FAMILY MEDICINE | Admitting: FAMILY MEDICINE
Payer: COMMERCIAL

## 2020-03-15 VITALS
SYSTOLIC BLOOD PRESSURE: 130 MMHG | HEART RATE: 55 BPM | HEIGHT: 70 IN | OXYGEN SATURATION: 97 % | TEMPERATURE: 98 F | DIASTOLIC BLOOD PRESSURE: 70 MMHG | WEIGHT: 270 LBS | RESPIRATION RATE: 16 BRPM | BODY MASS INDEX: 38.65 KG/M2

## 2020-03-15 DIAGNOSIS — K52.9 ENTERITIS: ICD-10-CM

## 2020-03-15 DIAGNOSIS — R10.9 ABDOMINAL PAIN: Primary | ICD-10-CM

## 2020-03-15 LAB
ALBUMIN SERPL BCP-MCNC: 4.2 G/DL (ref 3.5–5.7)
ALP SERPL-CCNC: 43 U/L (ref 40–150)
ALT SERPL W P-5'-P-CCNC: 12 U/L (ref 7–52)
ANION GAP SERPL CALCULATED.3IONS-SCNC: 8 MMOL/L (ref 4–13)
APTT PPP: 33 SECONDS (ref 23–37)
AST SERPL W P-5'-P-CCNC: 12 U/L (ref 13–39)
BASOPHILS # BLD AUTO: 0 THOUSANDS/ΜL (ref 0–0.1)
BASOPHILS NFR BLD AUTO: 1 % (ref 0–2)
BILIRUB SERPL-MCNC: 0.4 MG/DL (ref 0.2–1)
BILIRUB UR QL STRIP: NEGATIVE
BUN SERPL-MCNC: 11 MG/DL (ref 7–25)
CALCIUM SERPL-MCNC: 9.5 MG/DL (ref 8.6–10.5)
CHLORIDE SERPL-SCNC: 110 MMOL/L (ref 98–107)
CLARITY UR: CLEAR
CO2 SERPL-SCNC: 23 MMOL/L (ref 21–31)
COLOR UR: NORMAL
CREAT SERPL-MCNC: 0.94 MG/DL (ref 0.6–1.2)
EOSINOPHIL # BLD AUTO: 0.2 THOUSAND/ΜL (ref 0–0.61)
EOSINOPHIL NFR BLD AUTO: 4 % (ref 0–5)
ERYTHROCYTE [DISTWIDTH] IN BLOOD BY AUTOMATED COUNT: 13.4 % (ref 11.5–14.5)
GFR SERPL CREATININE-BSD FRML MDRD: 72 ML/MIN/1.73SQ M
GLUCOSE SERPL-MCNC: 93 MG/DL (ref 65–99)
GLUCOSE UR STRIP-MCNC: NEGATIVE MG/DL
HCT VFR BLD AUTO: 40.3 % (ref 42–47)
HGB BLD-MCNC: 13.2 G/DL (ref 12–16)
HGB UR QL STRIP.AUTO: NEGATIVE
INR PPP: 1.07 (ref 0.9–1.5)
KETONES UR STRIP-MCNC: NEGATIVE MG/DL
LACTATE SERPL-SCNC: 0.7 MMOL/L (ref 0.5–2)
LEUKOCYTE ESTERASE UR QL STRIP: NEGATIVE
LIPASE SERPL-CCNC: 17 U/L (ref 11–82)
LYMPHOCYTES # BLD AUTO: 1.5 THOUSANDS/ΜL (ref 0.6–4.47)
LYMPHOCYTES NFR BLD AUTO: 23 % (ref 21–51)
MCH RBC QN AUTO: 29.1 PG (ref 26–34)
MCHC RBC AUTO-ENTMCNC: 32.9 G/DL (ref 31–37)
MCV RBC AUTO: 89 FL (ref 81–99)
MONOCYTES # BLD AUTO: 0.3 THOUSAND/ΜL (ref 0.17–1.22)
MONOCYTES NFR BLD AUTO: 5 % (ref 2–12)
NEUTROPHILS # BLD AUTO: 4.4 THOUSANDS/ΜL (ref 1.4–6.5)
NEUTS SEG NFR BLD AUTO: 68 % (ref 42–75)
NITRITE UR QL STRIP: NEGATIVE
PH UR STRIP.AUTO: 6.5 [PH]
PLATELET # BLD AUTO: 221 THOUSANDS/UL (ref 149–390)
PMV BLD AUTO: 7.1 FL (ref 8.6–11.7)
POTASSIUM SERPL-SCNC: 3.8 MMOL/L (ref 3.5–5.5)
PROT SERPL-MCNC: 6.5 G/DL (ref 6.4–8.9)
PROT UR STRIP-MCNC: NEGATIVE MG/DL
PROTHROMBIN TIME: 12.3 SECONDS (ref 10.2–13)
RBC # BLD AUTO: 4.54 MILLION/UL (ref 3.9–5.2)
SODIUM SERPL-SCNC: 141 MMOL/L (ref 134–143)
SP GR UR STRIP.AUTO: 1.01 (ref 1–1.03)
UROBILINOGEN UR QL STRIP.AUTO: 0.2 E.U./DL
WBC # BLD AUTO: 6.5 THOUSAND/UL (ref 4.8–10.8)

## 2020-03-15 PROCEDURE — 81003 URINALYSIS AUTO W/O SCOPE: CPT | Performed by: PHYSICIAN ASSISTANT

## 2020-03-15 PROCEDURE — 96361 HYDRATE IV INFUSION ADD-ON: CPT

## 2020-03-15 PROCEDURE — 36415 COLL VENOUS BLD VENIPUNCTURE: CPT | Performed by: PHYSICIAN ASSISTANT

## 2020-03-15 PROCEDURE — 85730 THROMBOPLASTIN TIME PARTIAL: CPT | Performed by: PHYSICIAN ASSISTANT

## 2020-03-15 PROCEDURE — 85025 COMPLETE CBC W/AUTO DIFF WBC: CPT | Performed by: PHYSICIAN ASSISTANT

## 2020-03-15 PROCEDURE — 80053 COMPREHEN METABOLIC PANEL: CPT | Performed by: PHYSICIAN ASSISTANT

## 2020-03-15 PROCEDURE — 85610 PROTHROMBIN TIME: CPT | Performed by: PHYSICIAN ASSISTANT

## 2020-03-15 PROCEDURE — 99284 EMERGENCY DEPT VISIT MOD MDM: CPT

## 2020-03-15 PROCEDURE — 96375 TX/PRO/DX INJ NEW DRUG ADDON: CPT

## 2020-03-15 PROCEDURE — 74177 CT ABD & PELVIS W/CONTRAST: CPT

## 2020-03-15 PROCEDURE — 87040 BLOOD CULTURE FOR BACTERIA: CPT | Performed by: PHYSICIAN ASSISTANT

## 2020-03-15 PROCEDURE — 96374 THER/PROPH/DIAG INJ IV PUSH: CPT

## 2020-03-15 PROCEDURE — 99285 EMERGENCY DEPT VISIT HI MDM: CPT | Performed by: PHYSICIAN ASSISTANT

## 2020-03-15 PROCEDURE — 83605 ASSAY OF LACTIC ACID: CPT | Performed by: PHYSICIAN ASSISTANT

## 2020-03-15 PROCEDURE — 83690 ASSAY OF LIPASE: CPT | Performed by: PHYSICIAN ASSISTANT

## 2020-03-15 RX ORDER — ONDANSETRON 2 MG/ML
4 INJECTION INTRAMUSCULAR; INTRAVENOUS ONCE
Status: COMPLETED | OUTPATIENT
Start: 2020-03-15 | End: 2020-03-15

## 2020-03-15 RX ORDER — HYDROMORPHONE HCL/PF 1 MG/ML
1 SYRINGE (ML) INJECTION ONCE
Status: COMPLETED | OUTPATIENT
Start: 2020-03-15 | End: 2020-03-15

## 2020-03-15 RX ORDER — PREGABALIN 50 MG/1
50 CAPSULE ORAL
COMMUNITY
Start: 2020-03-04 | End: 2020-07-30

## 2020-03-15 RX ORDER — ONDANSETRON 4 MG/1
4 TABLET, ORALLY DISINTEGRATING ORAL EVERY 6 HOURS PRN
Qty: 20 TABLET | Refills: 0 | Status: SHIPPED | OUTPATIENT
Start: 2020-03-15 | End: 2020-09-03 | Stop reason: ALTCHOICE

## 2020-03-15 RX ORDER — DICYCLOMINE HCL 20 MG
20 TABLET ORAL 2 TIMES DAILY
Qty: 20 TABLET | Refills: 0 | Status: SHIPPED | OUTPATIENT
Start: 2020-03-15 | End: 2020-07-30

## 2020-03-15 RX ORDER — MORPHINE SULFATE 4 MG/ML
4 INJECTION, SOLUTION INTRAMUSCULAR; INTRAVENOUS ONCE
Status: COMPLETED | OUTPATIENT
Start: 2020-03-15 | End: 2020-03-15

## 2020-03-15 RX ADMIN — MORPHINE SULFATE 4 MG: 4 INJECTION INTRAVENOUS at 10:08

## 2020-03-15 RX ADMIN — SODIUM CHLORIDE 1000 ML: 0.9 INJECTION, SOLUTION INTRAVENOUS at 10:01

## 2020-03-15 RX ADMIN — IOHEXOL 100 ML: 350 INJECTION, SOLUTION INTRAVENOUS at 11:09

## 2020-03-15 RX ADMIN — ONDANSETRON 4 MG: 2 INJECTION INTRAMUSCULAR; INTRAVENOUS at 10:05

## 2020-03-15 RX ADMIN — HYDROMORPHONE HYDROCHLORIDE 1 MG: 1 INJECTION, SOLUTION INTRAMUSCULAR; INTRAVENOUS; SUBCUTANEOUS at 12:47

## 2020-03-15 NOTE — ED PROVIDER NOTES
History  Chief Complaint   Patient presents with    Abdominal Pain     right sided; was told several days ago she has appendicitis and crohn's disease  pt was admitted for Iv fluids and antibiotics and was told to f/u with Dr Benjamin Shukla (Has appt tomorrow); pt was told to return to ER if pain worsened     Patient presents to the emergency department today for evaluation of right lower quadrant abdominal pain  She was inpatient earlier this week for a abdominal pain  She is being worked up for Tama Win[place] disease  She had undergone pill endoscopy  CT scan earlier in the week showed a thickened appendix for she was admitted seen by General surgery  IV fluids were administered  Pain essentially resolved was discharged home  She was told to return to the emergency department if she noted increased pain  She states she started with a dull pain in the right lower quadrant abdomen yesterday however states this morning she has severe pain in the right lower quadrant the abdomen  She does appear to be in moderate distress upon initial evaluation  She is nauseous as well  She states she has had diarrhea  She states it is slightly darker in color however she recently restarted her iron supplementation  She denies constipation  She denies vomiting  She last had oral intake this morning 4 cups of coffee  Prior to Admission Medications   Prescriptions Last Dose Informant Patient Reported? Taking?    Cyanocobalamin (VITAMIN B 12 PO) 3/15/2020 at Unknown time Self Yes Yes   Sig: Take by mouth   cholecalciferol (VITAMIN D3) 1,000 units tablet 3/15/2020 at Unknown time  Yes Yes   Sig: Take 2,000 Units by mouth daily   ferrous sulfate (IRON SUPPLEMENT) 325 (65 Fe) mg tablet 3/15/2020 at Unknown time  Yes Yes   Sig: Take 325 mg by mouth daily with breakfast   fluticasone (FLONASE) 50 mcg/act nasal spray More than a month at Unknown time  No No   Si spray into each nostril daily   Patient not taking: Reported on 2020   levothyroxine 100 mcg tablet 3/15/2020 at Unknown time  Yes Yes   Sig: Take 100 mcg by mouth daily   omeprazole (PriLOSEC) 20 mg delayed release capsule 3/14/2020 at Unknown time  Yes Yes   Sig: Take 1 capsule by mouth daily    pramipexole (MIRAPEX) 0 5 mg tablet 3/14/2020 at Unknown time Self Yes Yes   Sig: Take 0 5 mg by mouth 2 (two) times a day    pregabalin (LYRICA) 50 mg capsule 3/14/2020 at Unknown time  Yes Yes   Sig: Take 50 mg by mouth daily at bedtime   sucralfate (CARAFATE) 1 g/10 mL suspension 3/14/2020 at Unknown time  No Yes   Sig: Take 10 mL (1,000 mg total) by mouth 4 (four) times a day (with meals and at bedtime)   topiramate (TOPAMAX) 50 MG tablet 3/14/2020 at Unknown time Self Yes Yes   Sig: Take 50 mg by mouth daily at bedtime       Facility-Administered Medications: None       Past Medical History:   Diagnosis Date    Bulging of cervical intervertebral disc     Crohn disease ([address] )     Disease of thyroid gland     Migraines     Neck pain, chronic     Polycystic ovarian syndrome     PONV (postoperative nausea and vomiting)     TMJ (temporomandibular joint syndrome)        Past Surgical History:   Procedure Laterality Date    ADENOIDECTOMY      ANKLE SURGERY Left     reconstruction     SECTION      x2    DIAGNOSTIC LAPAROSCOPY      HYSTERECTOMY      KS ARTHRODESIS ANT INTERBODY INC DISCECTOMY, CERVICAL BELOW C2 Bilateral 2019    Procedure: C5/6 ANTERIOR CERVICAL DECOMPRESSION AND FUSION;  Surgeon: Dione Tripp MD;  Location: [facility];  Service: Neurosurgery    TONSILLECTOMY      TUBAL LIGATION         History reviewed  No pertinent family history  I have reviewed and agree with the history as documented      E-Cigarette/Vaping    E-Cigarette Use Never User      E-Cigarette/Vaping Substances     Social History     Tobacco Use    Smoking status: Current Every Day Smoker     Packs/day: 1 00     Types: Cigarettes    Smokeless tobacco: Never Used   Substance Use Topics    Alcohol use: Never     Frequency: Never    Drug use: Never       Review of Systems   Constitutional: Negative  Negative for chills and fever  HENT: Negative  Negative for sore throat and trouble swallowing  Eyes: Negative  Respiratory: Negative  Negative for cough, shortness of breath and wheezing  Cardiovascular: Negative  Negative for chest pain and leg swelling  Gastrointestinal: Positive for abdominal pain, diarrhea and nausea  Negative for blood in stool and vomiting  Endocrine: Negative  Genitourinary: Negative  Musculoskeletal: Negative  Negative for neck stiffness  Skin: Negative  Allergic/Immunologic: Negative  Neurological: Negative  Negative for dizziness, seizures, speech difficulty, weakness, light-headedness, numbness and headaches  Hematological: Negative  Psychiatric/Behavioral: Negative  All other systems reviewed and are negative  Physical Exam  Physical Exam   Constitutional: She is oriented to person, place, and time  Vital signs are normal  She appears well-developed and well-nourished  She does not have a sickly appearance  She appears ill  No distress  HENT:   Right Ear: External ear normal  No swelling  Tympanic membrane is not bulging  Left Ear: External ear normal  No swelling  Tympanic membrane is not bulging  Nose: Nose normal    Mouth/Throat: Oropharynx is clear and moist  No oropharyngeal exudate  Eyes: Pupils are equal, round, and reactive to light  Conjunctivae, EOM and lids are normal    Neck: Normal range of motion  Neck supple  No JVD present  No tracheal deviation, no edema and normal range of motion present  No thyromegaly present  Cardiovascular: Normal rate, regular rhythm, normal heart sounds, intact distal pulses and normal pulses  Exam reveals no gallop and no friction rub  No murmur heard  Pulmonary/Chest: Effort normal and breath sounds normal  No stridor  No respiratory distress   She has no wheezes  She has no rales  She exhibits no tenderness  Abdominal: Soft  Bowel sounds are normal  She exhibits no distension and no mass  There is tenderness in the right lower quadrant  There is guarding and tenderness at McBurney's point  There is no rigidity, no CVA tenderness and negative Fine's sign  No hernia  Musculoskeletal: Normal range of motion  She exhibits no edema or tenderness  Lymphadenopathy:     She has no cervical adenopathy  Neurological: She is alert and oriented to person, place, and time  She has normal strength and normal reflexes  No cranial nerve deficit or sensory deficit  GCS eye subscore is 4  GCS verbal subscore is 5  GCS motor subscore is 6  Skin: Skin is warm and dry  Capillary refill takes less than 2 seconds  No rash noted  She is not diaphoretic  No erythema  No pallor  Psychiatric: She has a normal mood and affect  Her speech is normal and behavior is normal    Vitals reviewed        Vital Signs  ED Triage Vitals [03/15/20 0920]   Temperature Pulse Respirations Blood Pressure SpO2   97 8 °F (36 6 °C) 74 18 141/69 100 %      Temp Source Heart Rate Source Patient Position - Orthostatic VS BP Location FiO2 (%)   Temporal Monitor Sitting Left arm --      Pain Score       4           Vitals:    03/15/20 0920 03/15/20 1115   BP: 141/69 131/70   Pulse: 74 66   Patient Position - Orthostatic VS: Sitting Lying         Visual Acuity      ED Medications  Medications   sodium chloride 0 9 % bolus 1,000 mL (0 mL Intravenous Stopped 3/15/20 1229)   ondansetron (ZOFRAN) injection 4 mg (4 mg Intravenous Given 3/15/20 1005)   morphine (PF) 4 mg/mL injection 4 mg (4 mg Intravenous Given 3/15/20 1008)   iohexol (OMNIPAQUE) 350 MG/ML injection (MULTI-DOSE) 100 mL (100 mL Intravenous Given 3/15/20 1109)   HYDROmorphone (DILAUDID) injection 1 mg (1 mg Intravenous Given 3/15/20 1247)       Diagnostic Studies  Results Reviewed     Procedure Component Value Units Date/Time    Lipase [[id number]]  (Normal) Collected:  03/15/20 1025    Lab Status:  Final result Specimen:  Blood from Hand, Right Updated:  03/15/20 1048     Lipase 17 u/L     Lactic acid, plasma x2 [[id number]]  (Normal) Collected:  03/15/20 1025    Lab Status:  Final result Specimen:  Blood from Arm, Right Updated:  03/15/20 1048     LACTIC ACID 0 7 mmol/L     Narrative:       Result may be elevated if tourniquet was used during collection      Comprehensive metabolic panel [[id number]]  (Abnormal) Collected:  03/15/20 1025    Lab Status:  Final result Specimen:  Blood from Hand, Right Updated:  03/15/20 1048     Sodium 141 mmol/L      Potassium 3 8 mmol/L      Chloride 110 mmol/L      CO2 23 mmol/L      ANION GAP 8 mmol/L      BUN 11 mg/dL      Creatinine 0 94 mg/dL      Glucose 93 mg/dL      Calcium 9 5 mg/dL      AST 12 U/L      ALT 12 U/L      Alkaline Phosphatase 43 U/L      Total Protein 6 5 g/dL      Albumin 4 2 g/dL      Total Bilirubin 0 40 mg/dL      eGFR 72 ml/min/1 73sq m     Narrative:       National Kidney Disease Foundation guidelines for Chronic Kidney Disease (CKD):     Stage 1 with normal or high GFR (GFR > 90 mL/min/1 73 square meters)    Stage 2 Mild CKD (GFR = 60-89 mL/min/1 73 square meters)    Stage 3A Moderate CKD (GFR = 45-59 mL/min/1 73 square meters)    Stage 3B Moderate CKD (GFR = 30-44 mL/min/1 73 square meters)    Stage 4 Severe CKD (GFR = 15-29 mL/min/1 73 square meters)    Stage 5 End Stage CKD (GFR <15 mL/min/1 73 square meters)  Note: GFR calculation is accurate only with a steady state creatinine    Protime-INR [[id number]]  (Normal) Collected:  03/15/20 1025    Lab Status:  Final result Specimen:  Blood from Arm, Right Updated:  03/15/20 1045     Protime 12 3 seconds      INR 1 07    APTT [[id number]]  (Normal) Collected:  03/15/20 1025    Lab Status:  Final result Specimen:  Blood from Arm, Right Updated:  03/15/20 1045     PTT 33 seconds     CBC and differential [[id number]]  (Abnormal) Collected:  03/15/20 1025    Lab Status:  Final result Specimen:  Blood from Hand, Right Updated:  03/15/20 1034     WBC 6 50 Thousand/uL      RBC 4 54 Million/uL      Hemoglobin 13 2 g/dL      Hematocrit 40 3 %      MCV 89 fL      MCH 29 1 pg      MCHC 32 9 g/dL      RDW 13 4 %      MPV 7 1 fL      Platelets 338 Thousands/uL      Neutrophils Relative 68 %      Lymphocytes Relative 23 %      Monocytes Relative 5 %      Eosinophils Relative 4 %      Basophils Relative 1 %      Neutrophils Absolute 4 40 Thousands/µL      Lymphocytes Absolute 1 50 Thousands/µL      Monocytes Absolute 0 30 Thousand/µL      Eosinophils Absolute 0 20 Thousand/µL      Basophils Absolute 0 00 Thousands/µL     Blood culture #2 [[id number]] Collected:  03/15/20 1025    Lab Status: In process Specimen:  Blood from Hand, Right Updated:  03/15/20 1028    Blood culture #1 [[id number]] Collected:  03/15/20 1025    Lab Status: In process Specimen:  Blood from Hand, Right Updated:  03/15/20 1028    UA (URINE) with reflex to Scope [[id number]]  (Normal) Collected:  03/15/20 1011    Lab Status:  Final result Specimen:  Urine, Clean Catch Updated:  03/15/20 1018     Color, UA Straw     Clarity, UA Clear     Specific Gravity, UA 1 010     pH, UA 6 5     Leukocytes, UA Negative     Nitrite, UA Negative     Protein, UA Negative mg/dl      Glucose, UA Negative mg/dl      Ketones, UA Negative mg/dl      Urobilinogen, UA 0 2 E U /dl      Bilirubin, UA Negative     Blood, UA Negative    Lactic acid, plasma x2 [[id number]]     Lab Status:  No result Specimen:  Blood                  CT abdomen pelvis with contrast   Final Result by Sean Matias MD (03/15 1255)      Patient is status post recent antibiotic therapy for a previously seen appendiceal thickening and surrounding inflammatory change  Appendix has decreased in caliber measuring 5 mm with interval improvement in surrounding inflammatory change    No definite evidence for acute appendicitis on the current exam       Under distention limits evaluation for bowel wall thickening  Nonspecific fluid-filled small bowel loops are noted  Fat-containing umbilical hernia  Workstation performed: [id number]                    Procedures  Procedures         ED Course  ED Course as of Mar 15 1334   Sun Mar 15, 2020   9713 Blood Pressure: 141/69   0951 Temperature: 97 8 °F (36 6 °C)   0951 Pulse: 74   0951 Respirations: 18   0951 SpO2: 100 %   1028 Color, UA: Straw   1028 Clarity, UA: Clear   1028 SL AMB SPECIFIC GRAVITY_URINE: 1 010   1028 POCT URINE PROTEIN: Negative   1028 Glucose, UA: Negative   1028 Ketones, UA: Negative   1035 WBC: 6 50   1035 Hemoglobin: 13 2   1035 Platelet Count: 816   1059 LACTIC ACID: 0 7   1059 Lipase: 17   1059 PTT: 33   1059 INR: 1 07   1059 Sodium: 141   1059 Potassium: 3 8   1059 Chloride(!): 110   1059 BUN: 11   1059 ALT: 12   1059 eGFR: 72   1124 Awaiting CT read      1146 Awaiting CT read      1238 Awaiting CT read      9477 Awaiting CT read      1304 IMPRESSION:     Patient is status post recent antibiotic therapy for a previously seen appendiceal thickening and surrounding inflammatory change      Appendix has decreased in caliber measuring 5 mm with interval improvement in surrounding inflammatory change  No definite evidence for acute appendicitis on the current exam      Under distention limits evaluation for bowel wall thickening  Nonspecific fluid-filled small bowel loops are noted      Fat-containing umbilical hernia  1328 Patient was re-evaluated again at 1320 hours  She notes improvement of her pain  She is not vomiting  CT today is improved from prior  White count normal   No evidence of elevations in liver enzymes  Lactic acid normal   Lipase normal   We will discharge on Bentyl and Zofran  She will follow-up with surgeon tomorrow                                      MDM      Disposition  Final diagnoses:   Abdominal pain   Enteritis     Time reflects when diagnosis was documented in both MDM as applicable and the Disposition within this note     Time User Action Codes Description Comment    3/15/2020  1:04 PM Joycelyn MAYFIELD Add [R10 9] Abdominal pain     3/15/2020  1:30 PM Irving Long Add [K52 9] Enteritis       ED Disposition     ED Disposition Condition Date/Time Comment    Discharge Stable Sun Mar 15, 2020  1:30 PM Marce Mancilla discharge to home/self care  Follow-up Information     Follow up With Specialties Details Why Inocencio Ramirez MD Family Medicine Schedule an appointment as soon as possible for a visit  As needed [address]  [address] [facility] [address] [place] [address]      Courtney Palumbo MD General Surgery Go in 1 day  [address] [facility]  [address] [facility]  [address]            Patient's Medications   Discharge Prescriptions    DICYCLOMINE (BENTYL) 20 MG TABLET    Take 1 tablet (20 mg total) by mouth 2 (two) times a day       Start Date: 3/15/2020 End Date: --       Order Dose: 20 mg       Quantity: 20 tablet    Refills: 0    ONDANSETRON (ZOFRAN-ODT) 4 MG DISINTEGRATING TABLET    Take 1 tablet (4 mg total) by mouth every 6 (six) hours as needed for nausea or vomiting       Start Date: 3/15/2020 End Date: --       Order Dose: 4 mg       Quantity: 20 tablet    Refills: 0     No discharge procedures on file      PDMP Review     None          ED Provider  Electronically Signed by           Doretha Willard PA-C  03/15/20 7775

## 2020-03-16 ENCOUNTER — OFFICE VISIT (OUTPATIENT)
Dept: SURGERY | Facility: CLINIC | Age: 50
End: 2020-03-16

## 2020-03-16 VITALS
SYSTOLIC BLOOD PRESSURE: 116 MMHG | WEIGHT: 270 LBS | TEMPERATURE: 97.4 F | DIASTOLIC BLOOD PRESSURE: 68 MMHG | RESPIRATION RATE: 18 BRPM | HEIGHT: 70 IN | HEART RATE: 60 BPM | BODY MASS INDEX: 38.65 KG/M2

## 2020-03-16 DIAGNOSIS — R10.31 RIGHT LOWER QUADRANT ABDOMINAL PAIN: Primary | ICD-10-CM

## 2020-03-16 PROCEDURE — 99213 OFFICE O/P EST LOW 20 MIN: CPT | Performed by: SPECIALIST

## 2020-03-16 NOTE — PROGRESS NOTES
Assessment/Plan:    No problem-specific Assessment & Plan notes found for this encounter  Diagnoses and all orders for this visit:    Right lower quadrant abdominal pain          Subjective:      Patient ID: Ramona Shah is a 52 y o  female  The patient presents in follow-up from inpatient admission March 10th for right lower quadrant abdominal pain  She was admitted during the course of the capsule endoscopy, and a CT scan that demonstrated a dilated appendix  The capsule endoscopy being part of a workup for Crohn's disease  She was discharged from the hospital with resolution of leukocytosis as well as improvement in abdominal pain, this without any antibiotics  However, the patient return to the emergency room yesterday, March 15th, with recurrent symptoms  In particular, right lower quadrant abdominal pain nausea and diarrhea  A normal CBC was documented, and the CT scan was repeated, with improvement in the inflammation noted 5 days earlier  A presenting to the office today, the the patient symptoms are improved, although they do wax and wane  She has a follow-up scheduled with her gastroenterologist on March 18th, and at this point is not on any treatment for Crohn's disease  At present the patient does not seek to have her appendix removed, nor do I believe appendectomy would serve any benefit  She does understand that the calculus of risk and benefit may change, however at this point the risks associated with surgery outweigh any benefit  The following portions of the patient's history were reviewed and updated as appropriate: allergies, current medications, past family history, past medical history, past social history, past surgical history and problem list     Review of Systems   Constitutional: Negative for chills and fever  Respiratory: Negative for cough and shortness of breath  Gastrointestinal: Positive for diarrhea   Abdominal pain: Waxing and waning in character  Genitourinary: Negative for difficulty urinating  Skin: Negative for rash  Objective:      /68 (BP Location: Left arm, Patient Position: Sitting, Cuff Size: Large)   Pulse 60   Temp (!) 97 4 °F (36 3 °C) (Tympanic)   Resp 18   Ht 5' 10" (1 778 m)   Wt 122 kg (270 lb)   BMI 38 74 kg/m²          Physical Exam   Constitutional:   Obese white female in no acute distress   Eyes: No scleral icterus  Neck: Neck supple  Cardiovascular: Normal rate  Pulmonary/Chest: Effort normal    Abdominal: Soft  Bowel sounds are normal  Tenderness: Tenderness to deep palpation in the right lower quadrant  Hernia:  CT scanned documents small umbilical hernia with incarcerated preperitoneal fat  Skin: Skin is warm and dry  Psychiatric: She has a normal mood and affect

## 2020-03-18 NOTE — UTILIZATION REVIEW
PLS CALL BARBARA DANIEL @ [phone] FOR ANY INFORMATION IN REGARDS TO THIS PATIENT - RE FAXING CLINICALS W/ DC

## 2020-03-18 NOTE — UTILIZATION REVIEW
Initial Clinical Review  Observation 3/10/20 @ 2053, converted to inpatient admission 3/11/20 @ [phone] for continued care & tx for epigastric pain  Per MD:  Persistent epigastric pain with tenderness palpation rlq  Using iv morphine & iv zofran for pain & nausea respectively  Still no BM  KUB shows the Video capsule still hasn't moved, appears to be in the RLQ, probably in the terminal ileum If the video capsule does not pass into the colon over the next day or so, may need laparotomy with enterotomy vs bowel resection to retrieve  Observe for symptoms of obstruction or perforation  Scheduled for follow-up KUB  Per GI: crohn's disease of small bowel, appendicitis, erosive esophagitis  Continue iv PPI, carafate for erosive esophagitis & gerd, ivf maintained  Plan to treat crohn's when acute phase of illness resolved  Admitting Physician Billie Powell    Level of Care Med Surg    Estimated length of stay More than 2 Midnights    Certification I certify that inpatient services are medically necessary for this patient for a duration of greater than two midnights  See H&P and MD Progress Notes for additional information about the patient's course of treatment            Order History   Inpatient   Date/Time Action Taken User   03/11/20 1433 Release Terrell Mercado MD (auto-released)   03/11/20 1433 Complete Terrell Mercado MD     ED Arrival Information     Expected Arrival Acuity Means of Arrival Escorted By Service Admission Type    - 3/10/2020 16:39 Urgent Walk-In [facility] Surgery-General Urgent    Arrival Complaint    abdominal pain        Chief Complaint   Patient presents with    Abdominal Pain     According to the patient, she had swallowed abdominal capsule for endoscopy starting this morning     Assessment/Plan:   43-year-old female presents emergency department complaining of severe epigastric abdominal pain since approximately 9:00 a m  This morning    She was being evaluated by her gastroenterologist by doing a video capsule swallow study  Shortly after beginning the study she reported severe of the gastric pains  She complains of nausea generalized abdominal pain, severe tenderness, distention, bloating  Pain is causing significant distress  She is bent over clutching her abdomen  The Gastroenterology office call the emergency department expressed concern for the location of the capsule  They also stated that she may have erosive duodenitis and ileitis      ED Triage Vitals   Temperature Pulse Respirations Blood Pressure SpO2   03/10/20 1745 03/10/20 1745 03/10/20 1745 03/10/20 1745 03/10/20 1745   97 9 °F (36 6 °C) 85 20 140/80 98 %      Temp Source Heart Rate Source Patient Position - Orthostatic VS BP Location FiO2 (%)   03/10/20 1745 03/10/20 2045 03/10/20 2045 03/10/20 1745 --   Oral Monitor Lying Left arm       Pain Score       03/10/20 1844       Worst Possible Pain          Wt Readings from Last 1 Encounters:   03/16/20 122 kg (270 lb)     Additional Vital Signs:   Date/Time  Temp  Pulse  Resp  BP  MAP (mmHg)  SpO2  O2 Device  Patient Position - Orthostatic VS   03/11/20 0742  98 1 °F (36 7 °C)  71  18  96/55  --  98 %  None (Room air)  Lying   03/10/20 2307  98 6 °F (37 °C)  57  18  124/59  --  97 %  None (Room air)  Lying   03/10/20 2205  98 7 °F (37 1 °C)  63  18  117/58  --  99 %  None (Room air)  Sitting   03/10/20 2045  --  61  18  122/60  85  98 %  None (Room air)  Lying   03/10/20 1745  97 9 °F (36 6 °C)  85  20  140/80  --  98 %  --  --   Pertinent Labs/Diagnostic Test Results:   Results from last 7 days   Lab Units 03/15/20  1025 03/12/20  0446   WBC Thousand/uL 6 50 4 40*   HEMOGLOBIN g/dL 13 2 11 6*   HEMATOCRIT % 40 3* 35 4*   PLATELETS Thousands/uL 221 173   NEUTROS ABS Thousands/µL 4 40 2 30     Results from last 7 days   Lab Units 03/15/20  1025   SODIUM mmol/L 141   POTASSIUM mmol/L 3 8   CHLORIDE mmol/L 110*   CO2 mmol/L 23   ANION GAP mmol/L 8   BUN mg/dL 11 CREATININE mg/dL 0 94   EGFR ml/min/1 73sq m 72   CALCIUM mg/dL 9 5     Results from last 7 days   Lab Units 03/15/20  1025   AST U/L 12*   ALT U/L 12   ALK PHOS U/L 43   TOTAL PROTEIN g/dL 6 5   ALBUMIN g/dL 4 2   TOTAL BILIRUBIN mg/dL 0 40     Results from last 7 days   Lab Units 03/15/20  1025   GLUCOSE RANDOM mg/dL 93     Results from last 7 days   Lab Units 03/15/20  1025   LIPASE u/L 17     3/10  Ct a/p=1  Appendix appears thickened measuring approximately 1 cm with periappendiceal fatty infiltration and some fluid inferior to the cecum  These findings are suspicious for possible acute appendicitis  2  The swallowed capsule lies within the descending colon  3  Hepatomegaly  Kub= Endoscopic capsule projects over the right lower quadrant likely in the base of the cecum versus ileocecal valve  Retained intravenous contrast in the bilateral collecting systems, ureters, and bladder    ED Treatment:   Medication Administration from 03/10/2020 1639 to 03/10/2020 2201       Date/Time Order Dose Route Action     03/10/2020 1837 sodium chloride 0 9 % bolus 1,000 mL 1,000 mL Intravenous New Bag     03/10/2020 1844 morphine (PF) 10 mg/mL injection 8 mg 8 mg Intravenous Given     03/10/2020 1844 famotidine (PEPCID) injection 20 mg 20 mg Intravenous Given     03/10/2020 1839 ondansetron (ZOFRAN) injection 4 mg 4 mg Intravenous Given     03/10/2020 1936 iohexol (OMNIPAQUE) 350 MG/ML injection (MULTI-DOSE) 100 mL 100 mL Intravenous Given        Past Medical History:   Diagnosis Date    Bulging of cervical intervertebral disc     Crohn disease ([address] )     Disease of thyroid gland     Migraines     Neck pain, chronic     Polycystic ovarian syndrome     PONV (postoperative nausea and vomiting)     TMJ (temporomandibular joint syndrome)      Present on Admission:   (Resolved) Epigastric pain  Admitting Diagnosis: Epigastric pain [R10 13]  Abdominal pain [R10 9]  Age/Sex: 52 y o  female  Admission Orders:  Consult GI  scd  Scheduled Medications:  acetaminophen 650 mg Oral Q6H PRN   enoxaparin 40 mg Subcutaneous Daily   levothyroxine 100 mcg Oral Early Morning   morphine injection 2 mg Intravenous Q2H PRN-used x2/24hrs   nicotine 1 patch Transdermal Daily   ondansetron 4 mg Intravenous Q6H PRN-used x1   pantoprazole 40 mg Intravenous Q24H JOIE   pramipexole 0 5 mg Oral BID   sucralfate 1,000 mg Oral Q6H JOIE   topiramate 25 mg Oral BID   traMADol 50 mg Oral Q6H PRN     Continuous IV Infusions:  dextrose 5 % and sodium chloride 0 45 % with KCl 20 mEq/L 125 mL/hr Intravenous Continuous     Network Utilization Review Department  [email or web address]  ATTENTION: Please call with any questions or concerns to [phone] and carefully listen to the prompts so that you are directed to the right person  All voicemails are confidential   Ana Sharad all requests for admission clinical reviews, approved or denied determinations and any other requests to dedicated fax number below belonging to the campus where the patient is receiving treatment   List of dedicated fax numbers for the Facilities:  [address] [place] [address] DENIALS (Administrative/Medical Necessity) [phone]   [address] (Maternity/NICU/Pediatrics) [phone]   Miguel Aviles [phone]   [facility] [phone]   Alvaro Powell [phone]   [facility] [place] [phone]   [address] [place] [address] [phone]   [facility]  [phone]   [address] [facility]  [address] [facility] [address] [facility] [phone]

## 2020-03-20 LAB
BACTERIA BLD CULT: NORMAL
BACTERIA BLD CULT: NORMAL

## 2020-04-30 ENCOUNTER — APPOINTMENT (OUTPATIENT)
Dept: LAB | Facility: CLINIC | Age: 50
End: 2020-04-30
Payer: COMMERCIAL

## 2020-04-30 ENCOUNTER — TRANSCRIBE ORDERS (OUTPATIENT)
Dept: ADMINISTRATIVE | Facility: HOSPITAL | Age: 50
End: 2020-04-30

## 2020-04-30 DIAGNOSIS — K50.00 CROHN'S DISEASE OF SMALL INTESTINE WITHOUT COMPLICATION (HCC): Primary | ICD-10-CM

## 2020-04-30 DIAGNOSIS — K50.00 CROHN'S DISEASE OF SMALL INTESTINE WITHOUT COMPLICATION (HCC): ICD-10-CM

## 2020-04-30 DIAGNOSIS — R10.821 RIGHT UPPER QUADRANT ABDOMINAL TENDERNESS WITH REBOUND TENDERNESS: ICD-10-CM

## 2020-04-30 LAB — CRP SERPL QL: <3 MG/L

## 2020-04-30 PROCEDURE — 81383 HLA II TYPING 1 ALLELE HR: CPT

## 2020-04-30 PROCEDURE — 86140 C-REACTIVE PROTEIN: CPT

## 2020-04-30 PROCEDURE — 36415 COLL VENOUS BLD VENIPUNCTURE: CPT

## 2020-04-30 PROCEDURE — 81377 HLA II TYPE 1 AG EQUIV LR: CPT

## 2020-05-01 ENCOUNTER — APPOINTMENT (OUTPATIENT)
Dept: LAB | Facility: CLINIC | Age: 50
End: 2020-05-01
Payer: COMMERCIAL

## 2020-05-01 ENCOUNTER — TRANSCRIBE ORDERS (OUTPATIENT)
Dept: ADMINISTRATIVE | Facility: HOSPITAL | Age: 50
End: 2020-05-01

## 2020-05-01 DIAGNOSIS — R10.811 RIGHT UPPER QUADRANT ABDOMINAL TENDERNESS, REBOUND TENDERNESS PRESENCE NOT SPECIFIED: ICD-10-CM

## 2020-05-01 DIAGNOSIS — R10.813 RIGHT LOWER QUADRANT ABDOMINAL TENDERNESS, REBOUND TENDERNESS PRESENCE NOT SPECIFIED: ICD-10-CM

## 2020-05-01 DIAGNOSIS — K50.00 CROHN'S DISEASE OF SMALL INTESTINE WITHOUT COMPLICATION (HCC): ICD-10-CM

## 2020-05-01 DIAGNOSIS — R19.7 DIARRHEA, UNSPECIFIED TYPE: ICD-10-CM

## 2020-05-01 DIAGNOSIS — R19.7 DIARRHEA, UNSPECIFIED TYPE: Primary | ICD-10-CM

## 2020-05-01 PROCEDURE — 83993 ASSAY FOR CALPROTECTIN FECAL: CPT

## 2020-05-01 PROCEDURE — 87177 OVA AND PARASITES SMEARS: CPT

## 2020-05-01 PROCEDURE — 87505 NFCT AGENT DETECTION GI: CPT

## 2020-05-01 PROCEDURE — 87209 SMEAR COMPLEX STAIN: CPT

## 2020-05-02 LAB
C DIFF TOX GENS STL QL NAA+PROBE: NEGATIVE
CAMPYLOBACTER DNA SPEC NAA+PROBE: NORMAL
O+P STL CONC: NORMAL
SALMONELLA DNA SPEC QL NAA+PROBE: NORMAL
SHIGA TOXIN STX GENE SPEC NAA+PROBE: NORMAL
SHIGELLA DNA SPEC QL NAA+PROBE: NORMAL

## 2020-05-04 LAB — CALPROTECTIN STL-MCNT: 38 UG/G (ref 0–120)

## 2020-05-06 LAB
ANNOTATION COMMENT IMP: NORMAL
HLA-DQ2 QL: POSITIVE
HLA-DQ8 QL: NEGATIVE
REF LAB TEST METHOD: NORMAL

## 2020-06-02 ENCOUNTER — TELEPHONE (OUTPATIENT)
Dept: SURGERY | Facility: CLINIC | Age: 50
End: 2020-06-02

## 2020-06-04 ENCOUNTER — PROCEDURE VISIT (OUTPATIENT)
Dept: SURGERY | Facility: CLINIC | Age: 50
End: 2020-06-04
Payer: COMMERCIAL

## 2020-06-04 ENCOUNTER — TRANSCRIBE ORDERS (OUTPATIENT)
Dept: SURGERY | Facility: CLINIC | Age: 50
End: 2020-06-04

## 2020-06-04 VITALS
DIASTOLIC BLOOD PRESSURE: 70 MMHG | HEART RATE: 76 BPM | HEIGHT: 70 IN | RESPIRATION RATE: 16 BRPM | WEIGHT: 273 LBS | TEMPERATURE: 96.8 F | BODY MASS INDEX: 39.08 KG/M2 | SYSTOLIC BLOOD PRESSURE: 112 MMHG

## 2020-06-04 DIAGNOSIS — R10.813 RLQ ABDOMINAL TENDERNESS: Primary | ICD-10-CM

## 2020-06-04 DIAGNOSIS — R10.813 RLQ ABDOMINAL TENDERNESS: ICD-10-CM

## 2020-06-04 PROCEDURE — 99243 OFF/OP CNSLTJ NEW/EST LOW 30: CPT | Performed by: SURGERY

## 2020-06-04 RX ORDER — PANTOPRAZOLE SODIUM 40 MG/1
40 TABLET, DELAYED RELEASE ORAL DAILY
COMMUNITY
Start: 2020-05-13 | End: 2021-04-28 | Stop reason: ALTCHOICE

## 2020-07-28 ENCOUNTER — HOSPITAL ENCOUNTER (EMERGENCY)
Facility: HOSPITAL | Age: 50
Discharge: HOME | End: 2020-07-28
Attending: EMERGENCY MEDICINE | Admitting: EMERGENCY MEDICINE
Payer: COMMERCIAL

## 2020-07-28 ENCOUNTER — APPOINTMENT (EMERGENCY)
Dept: CT IMAGING | Facility: HOSPITAL | Age: 50
End: 2020-07-28
Payer: COMMERCIAL

## 2020-07-28 VITALS
OXYGEN SATURATION: 97 % | WEIGHT: 270 LBS | HEIGHT: 70 IN | HEART RATE: 83 BPM | RESPIRATION RATE: 16 BRPM | TEMPERATURE: 98.7 F | BODY MASS INDEX: 38.65 KG/M2 | SYSTOLIC BLOOD PRESSURE: 133 MMHG | DIASTOLIC BLOOD PRESSURE: 63 MMHG

## 2020-07-28 DIAGNOSIS — K62.5 RECTAL BLEEDING: Primary | ICD-10-CM

## 2020-07-28 LAB
ABO GROUP BLD: NORMAL
ALBUMIN SERPL BCP-MCNC: 4.1 G/DL (ref 3.5–5.7)
ALP SERPL-CCNC: 36 U/L (ref 40–150)
ALT SERPL W P-5'-P-CCNC: 12 U/L (ref 7–52)
ANION GAP SERPL CALCULATED.3IONS-SCNC: 8 MMOL/L (ref 4–13)
APTT PPP: 23 SECONDS (ref 23–37)
AST SERPL W P-5'-P-CCNC: 9 U/L (ref 13–39)
BASOPHILS # BLD AUTO: 0 THOUSANDS/ΜL (ref 0–0.1)
BASOPHILS NFR BLD AUTO: 0 % (ref 0–2)
BILIRUB SERPL-MCNC: 0.4 MG/DL (ref 0.2–1)
BILIRUB UR QL STRIP: NEGATIVE
BLD GP AB SCN SERPL QL: NEGATIVE
BUN SERPL-MCNC: 14 MG/DL (ref 7–25)
CALCIUM SERPL-MCNC: 9 MG/DL (ref 8.6–10.5)
CHLORIDE SERPL-SCNC: 110 MMOL/L (ref 98–107)
CLARITY UR: CLEAR
CO2 SERPL-SCNC: 22 MMOL/L (ref 21–31)
COLOR UR: NORMAL
CREAT SERPL-MCNC: 0.91 MG/DL (ref 0.6–1.2)
CRP SERPL QL: <5 MG/L
EOSINOPHIL # BLD AUTO: 0 THOUSAND/ΜL (ref 0–0.61)
EOSINOPHIL NFR BLD AUTO: 0 % (ref 0–5)
ERYTHROCYTE [DISTWIDTH] IN BLOOD BY AUTOMATED COUNT: 14.2 % (ref 11.5–14.5)
GFR SERPL CREATININE-BSD FRML MDRD: 75 ML/MIN/1.73SQ M
GLUCOSE SERPL-MCNC: 148 MG/DL (ref 65–99)
GLUCOSE UR STRIP-MCNC: NEGATIVE MG/DL
HCT VFR BLD AUTO: 37.9 % (ref 42–47)
HGB BLD-MCNC: 12.8 G/DL (ref 12–16)
HGB UR QL STRIP.AUTO: NEGATIVE
INR PPP: 1.03 (ref 0.84–1.19)
KETONES UR STRIP-MCNC: NEGATIVE MG/DL
LEUKOCYTE ESTERASE UR QL STRIP: NEGATIVE
LYMPHOCYTES # BLD AUTO: 1.1 THOUSANDS/ΜL (ref 0.6–4.47)
LYMPHOCYTES NFR BLD AUTO: 11 % (ref 21–51)
MCH RBC QN AUTO: 29.8 PG (ref 26–34)
MCHC RBC AUTO-ENTMCNC: 33.7 G/DL (ref 31–37)
MCV RBC AUTO: 89 FL (ref 81–99)
MONOCYTES # BLD AUTO: 0.2 THOUSAND/ΜL (ref 0.17–1.22)
MONOCYTES NFR BLD AUTO: 2 % (ref 2–12)
NEUTROPHILS # BLD AUTO: 8.8 THOUSANDS/ΜL (ref 1.4–6.5)
NEUTS SEG NFR BLD AUTO: 86 % (ref 42–75)
NITRITE UR QL STRIP: NEGATIVE
PH UR STRIP.AUTO: 6.5 [PH]
PLATELET # BLD AUTO: 269 THOUSANDS/UL (ref 149–390)
PMV BLD AUTO: 6.8 FL (ref 8.6–11.7)
POTASSIUM SERPL-SCNC: 4.3 MMOL/L (ref 3.5–5.5)
PROT SERPL-MCNC: 6.5 G/DL (ref 6.4–8.9)
PROT UR STRIP-MCNC: NEGATIVE MG/DL
PROTHROMBIN TIME: 13.4 SECONDS (ref 11.6–14.5)
RBC # BLD AUTO: 4.27 MILLION/UL (ref 3.9–5.2)
RH BLD: NEGATIVE
SODIUM SERPL-SCNC: 140 MMOL/L (ref 134–143)
SP GR UR STRIP.AUTO: 1.01 (ref 1–1.03)
SPECIMEN EXPIRATION DATE: NORMAL
UROBILINOGEN UR QL STRIP.AUTO: 0.2 E.U./DL
WBC # BLD AUTO: 10.2 THOUSAND/UL (ref 4.8–10.8)

## 2020-07-28 PROCEDURE — 85025 COMPLETE CBC W/AUTO DIFF WBC: CPT | Performed by: EMERGENCY MEDICINE

## 2020-07-28 PROCEDURE — 80053 COMPREHEN METABOLIC PANEL: CPT | Performed by: EMERGENCY MEDICINE

## 2020-07-28 PROCEDURE — 86140 C-REACTIVE PROTEIN: CPT | Performed by: EMERGENCY MEDICINE

## 2020-07-28 PROCEDURE — 81003 URINALYSIS AUTO W/O SCOPE: CPT | Performed by: EMERGENCY MEDICINE

## 2020-07-28 PROCEDURE — 86900 BLOOD TYPING SEROLOGIC ABO: CPT | Performed by: EMERGENCY MEDICINE

## 2020-07-28 PROCEDURE — 96365 THER/PROPH/DIAG IV INF INIT: CPT

## 2020-07-28 PROCEDURE — 86901 BLOOD TYPING SEROLOGIC RH(D): CPT | Performed by: EMERGENCY MEDICINE

## 2020-07-28 PROCEDURE — 99285 EMERGENCY DEPT VISIT HI MDM: CPT

## 2020-07-28 PROCEDURE — 96366 THER/PROPH/DIAG IV INF ADDON: CPT

## 2020-07-28 PROCEDURE — 99284 EMERGENCY DEPT VISIT MOD MDM: CPT | Performed by: EMERGENCY MEDICINE

## 2020-07-28 PROCEDURE — 93005 ELECTROCARDIOGRAM TRACING: CPT

## 2020-07-28 PROCEDURE — 86850 RBC ANTIBODY SCREEN: CPT | Performed by: EMERGENCY MEDICINE

## 2020-07-28 PROCEDURE — 36415 COLL VENOUS BLD VENIPUNCTURE: CPT | Performed by: EMERGENCY MEDICINE

## 2020-07-28 PROCEDURE — 74177 CT ABD & PELVIS W/CONTRAST: CPT

## 2020-07-28 PROCEDURE — 85610 PROTHROMBIN TIME: CPT | Performed by: EMERGENCY MEDICINE

## 2020-07-28 PROCEDURE — 85730 THROMBOPLASTIN TIME PARTIAL: CPT | Performed by: EMERGENCY MEDICINE

## 2020-07-28 PROCEDURE — C9113 INJ PANTOPRAZOLE SODIUM, VIA: HCPCS | Performed by: EMERGENCY MEDICINE

## 2020-07-28 RX ADMIN — SODIUM CHLORIDE 80 MG: 9 INJECTION, SOLUTION INTRAVENOUS at 13:44

## 2020-07-28 RX ADMIN — SODIUM CHLORIDE 8 MG/HR: 9 INJECTION, SOLUTION INTRAVENOUS at 14:08

## 2020-07-28 RX ADMIN — IOHEXOL 100 ML: 350 INJECTION, SOLUTION INTRAVENOUS at 15:41

## 2020-07-28 NOTE — ED PROVIDER NOTES
History  Chief Complaint   Patient presents with    GI Bleeding     bleeding from rectum for past few days     Thisi sa 51 year old female with known crohns on a prednisone taper who presents will diffuse mild abdominal pain and gi bleeding for the past 4 days  She has been having increasing fatigue, generalized weakness, headache, and presyncope  Bleeding primarily with bowel movements, occasionally spilling out between BMs  Has known internal and external hemmerhoids  Sees Dr Justin Charles of GI  Repoerts abdominal pain is "like fire," mild, and diffuse  Minimially worsening  Reports increased urinary frequency  Prior to Admission Medications   Prescriptions Last Dose Informant Patient Reported? Taking?    Cyanocobalamin (VITAMIN B 12 PO)  Self Yes No   Sig: Take by mouth   cholecalciferol (VITAMIN D3) 1,000 units tablet  Self Yes No   Sig: Take 2,000 Units by mouth daily   dicyclomine (BENTYL) 20 mg tablet   No No   Sig: Take 1 tablet (20 mg total) by mouth 2 (two) times a day   ferrous sulfate (IRON SUPPLEMENT) 325 (65 Fe) mg tablet  Self Yes No   Sig: Take 325 mg by mouth daily with breakfast   fluticasone (FLONASE) 50 mcg/act nasal spray   No No   Si spray into each nostril daily   Patient not taking: Reported on 2020   levothyroxine 100 mcg tablet  Self Yes No   Sig: Take 100 mcg by mouth daily   omeprazole (PriLOSEC) 20 mg delayed release capsule   Yes No   Sig: Take 1 capsule by mouth daily    ondansetron (ZOFRAN-ODT) 4 mg disintegrating tablet  Self No No   Sig: Take 1 tablet (4 mg total) by mouth every 6 (six) hours as needed for nausea or vomiting   pantoprazole (PROTONIX) 40 mg tablet  Self Yes No   pramipexole (MIRAPEX) 0 5 mg tablet  Self Yes No   Sig: Take 0 5 mg by mouth 2 (two) times a day    pregabalin (LYRICA) 50 mg capsule  Self Yes No   Sig: Take 50 mg by mouth daily at bedtime   sucralfate (CARAFATE) 1 g/10 mL suspension   No No   Sig: Take 10 mL (1,000 mg total) by mouth 4 (four) times a day (with meals and at bedtime)   topiramate (TOPAMAX) 50 MG tablet  Self Yes No   Sig: Take 50 mg by mouth daily at bedtime       Facility-Administered Medications: None       Past Medical History:   Diagnosis Date    Bulging of cervical intervertebral disc     Crohn disease ([address] )     Disease of thyroid gland     Migraines     Neck pain, chronic     Polycystic ovarian syndrome     PONV (postoperative nausea and vomiting)     TMJ (temporomandibular joint syndrome)        Past Surgical History:   Procedure Laterality Date    ADENOIDECTOMY      ANKLE SURGERY Left     reconstruction     SECTION      x2    DIAGNOSTIC LAPAROSCOPY      HYSTERECTOMY      GA ARTHRODESIS ANT INTERBODY INC DISCECTOMY, CERVICAL BELOW C2 Bilateral 2019    Procedure: C5/6 ANTERIOR CERVICAL DECOMPRESSION AND FUSION;  Surgeon: Martha Wolfe MD;  Location: [facility];  Service: Neurosurgery    TONSILLECTOMY      TUBAL LIGATION         Family History   Adopted: Yes     I have reviewed and agree with the history as documented  E-Cigarette/Vaping    E-Cigarette Use Never User      E-Cigarette/Vaping Substances     Social History     Tobacco Use    Smoking status: Current Every Day Smoker     Packs/day: 1 00     Types: Cigarettes    Smokeless tobacco: Never Used   Substance Use Topics    Alcohol use: Never     Frequency: Never    Drug use: Never       Review of Systems   Constitutional: Positive for activity change and fatigue  Negative for chills and fever  HENT: Negative for congestion  Eyes: Negative for visual disturbance  Respiratory: Positive for shortness of breath  Negative for cough and chest tightness  Cardiovascular: Positive for palpitations  Negative for chest pain and leg swelling  Gastrointestinal: Positive for abdominal pain, anal bleeding, blood in stool and diarrhea  Negative for nausea and vomiting  Genitourinary: Negative for dysuria  Skin: Negative for rash     Neurological: Positive for weakness (generalized)  Negative for dizziness and numbness  Physical Exam  Physical Exam   Constitutional: She is oriented to person, place, and time  She appears well-developed and well-nourished  HENT:   Head: Normocephalic and atraumatic  Eyes: Pupils are equal, round, and reactive to light  Conjunctivae and EOM are normal    Neck: Normal range of motion  Neck supple  Cardiovascular: Normal rate, regular rhythm and normal heart sounds  Pulmonary/Chest: Effort normal and breath sounds normal  No respiratory distress  Abdominal: Soft  Bowel sounds are normal  She exhibits no distension and no mass  There is tenderness (mild diffuse)  There is no rebound and no guarding  Musculoskeletal: Normal range of motion  Neurological: She is alert and oriented to person, place, and time  Skin: Skin is warm and dry  Capillary refill takes less than 2 seconds  Psychiatric: She has a normal mood and affect   Her behavior is normal        Vital Signs  ED Triage Vitals [07/28/20 1249]   Temperature Pulse Respirations Blood Pressure SpO2   98 7 °F (37 1 °C) 83 16 133/63 97 %      Temp Source Heart Rate Source Patient Position - Orthostatic VS BP Location FiO2 (%)   Temporal Monitor Sitting Left arm --      Pain Score       3           Vitals:    07/28/20 1249   BP: 133/63   Pulse: 83   Patient Position - Orthostatic VS: Sitting         Visual Acuity      ED Medications  Medications   pantoprazole (PROTONIX) 80 mg in sodium chloride 0 9 % 100 mL IVPB (0 mg Intravenous Stopped 7/28/20 1408)   iohexol (OMNIPAQUE) 350 MG/ML injection (MULTI-DOSE) 100 mL (100 mL Intravenous Given 7/28/20 1541)       Diagnostic Studies  Results Reviewed     Procedure Component Value Units Date/Time    C-reactive protein [[id number]]  (Normal) Collected:  07/28/20 1339    Lab Status:  Final result Specimen:  Blood from Arm, Left Updated:  07/28/20 7898     CRP <5 0 mg/L     UA w Reflex to Microscopic w Reflex to Culture [[id number]]  (Normal) Collected:  07/28/20 1401    Lab Status:  Final result Specimen:  Urine, Clean Catch Updated:  07/28/20 1408     Color, UA Straw     Clarity, UA Clear     Specific Gravity, UA 1 010     pH, UA 6 5     Leukocytes, UA Negative     Nitrite, UA Negative     Protein, UA Negative mg/dl      Glucose, UA Negative mg/dl      Ketones, UA Negative mg/dl      Urobilinogen, UA 0 2 E U /dl      Bilirubin, UA Negative     Blood, UA Negative    Comprehensive metabolic panel [[id number]]  (Abnormal) Collected:  07/28/20 1339    Lab Status:  Final result Specimen:  Blood from Arm, Left Updated:  07/28/20 1407     Sodium 140 mmol/L      Potassium 4 3 mmol/L      Chloride 110 mmol/L      CO2 22 mmol/L      ANION GAP 8 mmol/L      BUN 14 mg/dL      Creatinine 0 91 mg/dL      Glucose 148 mg/dL      Calcium 9 0 mg/dL      AST 9 U/L      ALT 12 U/L      Alkaline Phosphatase 36 U/L      Total Protein 6 5 g/dL      Albumin 4 1 g/dL      Total Bilirubin 0 40 mg/dL      eGFR 75 ml/min/1 73sq m     Narrative:       National Kidney Disease Foundation guidelines for Chronic Kidney Disease (CKD):     Stage 1 with normal or high GFR (GFR > 90 mL/min/1 73 square meters)    Stage 2 Mild CKD (GFR = 60-89 mL/min/1 73 square meters)    Stage 3A Moderate CKD (GFR = 45-59 mL/min/1 73 square meters)    Stage 3B Moderate CKD (GFR = 30-44 mL/min/1 73 square meters)    Stage 4 Severe CKD (GFR = 15-29 mL/min/1 73 square meters)    Stage 5 End Stage CKD (GFR <15 mL/min/1 73 square meters)  Note: GFR calculation is accurate only with a steady state creatinine    Protime-INR [[id number]]  (Normal) Collected:  07/28/20 1339    Lab Status:  Final result Specimen:  Blood from Arm, Left Updated:  07/28/20 1402     Protime 13 4 seconds      INR 1 03    APTT [[id number]]  (Normal) Collected:  07/28/20 1339    Lab Status:  Final result Specimen:  Blood from Arm, Left Updated:  07/28/20 1402     PTT 23 seconds     CBC and differential [[id number]] (Abnormal) Collected:  07/28/20 1339    Lab Status:  Final result Specimen:  Blood from Arm, Left Updated:  07/28/20 1347     WBC 10 20 Thousand/uL      RBC 4 27 Million/uL      Hemoglobin 12 8 g/dL      Hematocrit 37 9 %      MCV 89 fL      MCH 29 8 pg      MCHC 33 7 g/dL      RDW 14 2 %      MPV 6 8 fL      Platelets 771 Thousands/uL      Neutrophils Relative 86 %      Lymphocytes Relative 11 %      Monocytes Relative 2 %      Eosinophils Relative 0 %      Basophils Relative 0 %      Neutrophils Absolute 8 80 Thousands/µL      Lymphocytes Absolute 1 10 Thousands/µL      Monocytes Absolute 0 20 Thousand/µL      Eosinophils Absolute 0 00 Thousand/µL      Basophils Absolute 0 00 Thousands/µL                  CT abdomen pelvis with contrast   Final Result by Garfield Rush MD (07/28 2173)      1  No acute abnormality   2  Incidental 5 6 cm right adnexal cystic structure simple-appearing cyst, fully characterized  According to current guidelines Lestine Grad Radiol 2020; 28:531-706) in this premenopausal woman, this should be followed up in 6 to 12 months by pelvic    ultrasound  Workstation performed: [id number]                    Procedures  Procedures         ED Course                                             MDM  Number of Diagnoses or Management Options  Rectal bleeding: established and worsening  Diagnosis management comments: This is a 73-year-old female with rectal bleeding  Patient is guaiac negative  Patient's hemoglobin was stable in the emergency department  Discussed the case with patient's gastroenterologist Dr Domonique Quiroga  He agreed with close outpatient follow-up  Discussed the abnormal cyst and need for follow-up found on patient's ovary and the possibility for malignancy and need for follow-up  Patient appeared clinically well and was tolerating p o  At the time of discharge   Discussed warning signs and symptoms with the patient as well as when to return to the emergency department versus follow up with PC P  Patient states understanding and agreement with the plan  Amount and/or Complexity of Data Reviewed  Clinical lab tests: ordered and reviewed  Tests in the radiology section of CPT®: reviewed and ordered  Discuss the patient with other providers: yes          Disposition  Final diagnoses:   Rectal bleeding     Time reflects when diagnosis was documented in both MDM as applicable and the Disposition within this note     Time User Action Codes Description Comment    7/28/2020  4:10 PM Leora Connor Evans [K62 5] Rectal bleeding       ED Disposition     ED Disposition Condition Date/Time Comment    Discharge Stable Tue Jul 28, 2020  4:10 PM Carl Zhao discharge to home/self care              Follow-up Information     Follow up With Specialties Details Why Leo Sanchez MD Gastroenterology In 1 day  [address] [facility]  [phone]            Discharge Medication List as of 7/28/2020  4:11 PM      CONTINUE these medications which have NOT CHANGED    Details   cholecalciferol (VITAMIN D3) 1,000 units tablet Take 2,000 Units by mouth daily, Historical Med      Cyanocobalamin (VITAMIN B 12 PO) Take by mouth, Historical Med      dicyclomine (BENTYL) 20 mg tablet Take 1 tablet (20 mg total) by mouth 2 (two) times a day, Starting Sun 3/15/2020, Normal      ferrous sulfate (IRON SUPPLEMENT) 325 (65 Fe) mg tablet Take 325 mg by mouth daily with breakfast, Historical Med      fluticasone (FLONASE) 50 mcg/act nasal spray 1 spray into each nostril daily, Starting Mon 6/3/2019, Normal      levothyroxine 100 mcg tablet Take 100 mcg by mouth daily, Historical Med      omeprazole (PriLOSEC) 20 mg delayed release capsule Take 1 capsule by mouth daily , Starting Wed 12/18/2019, Historical Med      ondansetron (ZOFRAN-ODT) 4 mg disintegrating tablet Take 1 tablet (4 mg total) by mouth every 6 (six) hours as needed for nausea or vomiting, Starting Sun 3/15/2020, Normal pantoprazole (PROTONIX) 40 mg tablet Starting Wed 5/13/2020, Historical Med      pramipexole (MIRAPEX) 0 5 mg tablet Take 0 5 mg by mouth 2 (two) times a day , Historical Med      pregabalin (LYRICA) 50 mg capsule Take 50 mg by mouth daily at bedtime, Starting Wed 3/4/2020, Historical Med      sucralfate (CARAFATE) 1 g/10 mL suspension Take 10 mL (1,000 mg total) by mouth 4 (four) times a day (with meals and at bedtime), Starting Thu 3/12/2020, Normal      topiramate (TOPAMAX) 50 MG tablet Take 50 mg by mouth daily at bedtime , Starting Thu 4/25/2019, Historical Med           No discharge procedures on file      PDMP Review     None          ED Provider  Electronically Signed by           Efren Mcnamara MD  07/28/20 8521

## 2020-07-29 LAB
ATRIAL RATE: 73 BPM
P AXIS: 62 DEGREES
PR INTERVAL: 142 MS
QRS AXIS: 16 DEGREES
QRSD INTERVAL: 92 MS
QT INTERVAL: 416 MS
QTC INTERVAL: 458 MS
T WAVE AXIS: 60 DEGREES
VENTRICULAR RATE: 73 BPM

## 2020-07-29 PROCEDURE — 93010 ELECTROCARDIOGRAM REPORT: CPT | Performed by: INTERNAL MEDICINE

## 2020-07-29 RX ORDER — SODIUM CHLORIDE, SODIUM LACTATE, POTASSIUM CHLORIDE, CALCIUM CHLORIDE 600; 310; 30; 20 MG/100ML; MG/100ML; MG/100ML; MG/100ML
125 INJECTION, SOLUTION INTRAVENOUS CONTINUOUS
Status: CANCELLED | OUTPATIENT
Start: 2020-07-29

## 2020-07-30 ENCOUNTER — HOSPITAL ENCOUNTER (OUTPATIENT)
Dept: GASTROENTEROLOGY | Facility: HOSPITAL | Age: 50
Setting detail: OUTPATIENT SURGERY
Discharge: HOME | End: 2020-07-30
Attending: INTERNAL MEDICINE
Payer: COMMERCIAL

## 2020-07-30 VITALS
RESPIRATION RATE: 16 BRPM | HEART RATE: 59 BPM | SYSTOLIC BLOOD PRESSURE: 126 MMHG | BODY MASS INDEX: 38.65 KG/M2 | TEMPERATURE: 98.2 F | OXYGEN SATURATION: 100 % | WEIGHT: 270 LBS | DIASTOLIC BLOOD PRESSURE: 59 MMHG | HEIGHT: 70 IN

## 2020-07-30 DIAGNOSIS — K62.5 RECTAL BLEEDING: ICD-10-CM

## 2020-07-30 RX ORDER — PREDNISONE 1 MG/1
50 TABLET ORAL DAILY
COMMUNITY
End: 2020-09-03 | Stop reason: ALTCHOICE

## 2020-07-30 RX ORDER — MESALAMINE 500 MG/1
1000 CAPSULE, EXTENDED RELEASE ORAL 4 TIMES DAILY
COMMUNITY
End: 2021-04-28 | Stop reason: ALTCHOICE

## 2020-07-30 NOTE — INTERVAL H&P NOTE
H&P reviewed  After examining the patient I find no changes in the patients condition since the H&P had been written      Vitals:    07/30/20 1044   BP: 114/59   Pulse: 67   Resp: 18   Temp: (!) 97 3 °F (36 3 °C)   SpO2: 99%

## 2020-07-30 NOTE — NURSING NOTE
Patient states name is Sarah Dupree [birth year] with maiden name Charbel  H and P from Mare's office has Charbel on it  Patient reviewed H and P and confirmed it is the accurate H and P,  Mare aware of H and P as well as Edvin Way from [employer] office all state that it is the correct information

## 2020-07-30 NOTE — DISCHARGE INSTRUCTIONS
Hemorrhoids   WHAT YOU NEED TO KNOW:   Hemorrhoids are swollen blood vessels inside your rectum (internal hemorrhoids) or on your anus (external hemorrhoids)  Sometimes a hemorrhoid may prolapse  This means it extends out of your anus  DISCHARGE INSTRUCTIONS:   Return to the emergency department if:   · You have severe pain in your rectum or around your anus  · You have severe pain in your abdomen and you are vomiting  · You have bleeding from your anus that soaks through your underwear  Contact your healthcare provider if:   · You have frequent and painful bowel movements  · Your hemorrhoid looks or feels more swollen than usual      · You do not have a bowel movement for 2 days or more  · You see or feel tissue coming through your anus  · You have questions or concerns about your condition or care  Medicines: You may  need any of the following:  · A pad, cream, or ointment  can help decrease pain, swelling, and itching  · Stool softeners  help treat or prevent constipation  · NSAIDs , such as ibuprofen, help decrease swelling, pain, and fever  NSAIDs can cause stomach bleeding or kidney problems in certain people  If you take blood thinner medicine, always ask your healthcare provider if NSAIDs are safe for you  Always read the medicine label and follow directions  · Take your medicine as directed  Contact your healthcare provider if you think your medicine is not helping or if you have side effects  Tell him or her if you are allergic to any medicine  Keep a list of the medicines, vitamins, and herbs you take  Include the amounts, and when and why you take them  Bring the list or the pill bottles to follow-up visits  Carry your medicine list with you in case of an emergency  Manage your symptoms:   · Apply ice on your anus for 15 to 20 minutes every hour or as directed  Use an ice pack, or put crushed ice in a plastic bag   Cover it with a towel before you apply it to your anus  Ice helps prevent tissue damage and decreases swelling and pain  · Take a sitz bath  Fill a bathtub with 4 to 6 inches of warm water  You may also use a sitz bath pan that fits inside a toilet bowl  Sit in the sitz bath for 15 minutes  Do this 3 times a day, and after each bowel movement  The warm water can help decrease pain and swelling  · Keep your anal area clean  Gently wash the area with warm water daily  Soap may irritate the area  After a bowel movement, wipe with moist towelettes or wet toilet paper  Dry toilet paper can irritate the area  Prevent hemorrhoids:   · Do not strain to have a bowel movement  Do not sit on the toilet too long  These actions can increase pressure on the tissues in your rectum and anus  · Drink plenty of liquids  Liquids can help prevent constipation  Ask how much liquid to drink each day and which liquids are best for you  · Eat a variety of high-fiber foods  Examples include fruits, vegetables, and whole grains  Ask your healthcare provider how much fiber you need each day  You may need to take a fiber supplement  · Exercise as directed  Exercise, such as walking, may make it easier to have a bowel movement  Ask your healthcare provider to help you create an exercise plan  · Do not have anal sex  Anal sex can weaken the skin around your rectum and anus  · Avoid heavy lifting  This can cause straining and increase your risk for another hemorrhoid  Follow up with your healthcare provider as directed:  Write down your questions so you remember to ask them during your visits  © 2017 [facility] Information is for End User's use only and may not be sold, redistributed or otherwise used for commercial purposes  All illustrations and images included in CareNotes® are the copyrighted property of [facility] [employer]  or [employer]  The above information is an  only   It is not intended as medical advice for individual conditions or treatments  Talk to your doctor, nurse or pharmacist before following any medical regimen to see if it is safe and effective for you

## 2020-08-26 ENCOUNTER — APPOINTMENT (EMERGENCY)
Dept: NON INVASIVE DIAGNOSTICS | Facility: HOSPITAL | Age: 50
End: 2020-08-26
Payer: COMMERCIAL

## 2020-08-26 ENCOUNTER — HOSPITAL ENCOUNTER (EMERGENCY)
Facility: HOSPITAL | Age: 50
Discharge: HOME | End: 2020-08-26
Attending: EMERGENCY MEDICINE | Admitting: EMERGENCY MEDICINE
Payer: COMMERCIAL

## 2020-08-26 VITALS
HEIGHT: 70 IN | TEMPERATURE: 97.9 F | WEIGHT: 270 LBS | DIASTOLIC BLOOD PRESSURE: 62 MMHG | OXYGEN SATURATION: 99 % | RESPIRATION RATE: 18 BRPM | BODY MASS INDEX: 38.65 KG/M2 | SYSTOLIC BLOOD PRESSURE: 119 MMHG | HEART RATE: 65 BPM

## 2020-08-26 DIAGNOSIS — I83.813 VARICOSE VEINS OF BOTH LOWER EXTREMITIES WITH PAIN: ICD-10-CM

## 2020-08-26 DIAGNOSIS — I82.409 DVT (DEEP VENOUS THROMBOSIS) (HCC): Primary | ICD-10-CM

## 2020-08-26 LAB
ALBUMIN SERPL BCP-MCNC: 4.1 G/DL (ref 3.5–5.7)
ALP SERPL-CCNC: 40 U/L (ref 40–150)
ALT SERPL W P-5'-P-CCNC: 15 U/L (ref 7–52)
ANION GAP SERPL CALCULATED.3IONS-SCNC: 7 MMOL/L (ref 4–13)
APTT PPP: 26 SECONDS (ref 23–37)
AST SERPL W P-5'-P-CCNC: 10 U/L (ref 13–39)
BASOPHILS # BLD AUTO: 0 THOUSANDS/ΜL (ref 0–0.1)
BASOPHILS NFR BLD AUTO: 1 % (ref 0–2)
BILIRUB SERPL-MCNC: 0.4 MG/DL (ref 0.2–1)
BUN SERPL-MCNC: 14 MG/DL (ref 7–25)
CALCIUM SERPL-MCNC: 9.4 MG/DL (ref 8.6–10.5)
CHLORIDE SERPL-SCNC: 110 MMOL/L (ref 98–107)
CO2 SERPL-SCNC: 24 MMOL/L (ref 21–31)
CREAT SERPL-MCNC: 0.85 MG/DL (ref 0.6–1.2)
EOSINOPHIL # BLD AUTO: 0.2 THOUSAND/ΜL (ref 0–0.61)
EOSINOPHIL NFR BLD AUTO: 3 % (ref 0–5)
ERYTHROCYTE [DISTWIDTH] IN BLOOD BY AUTOMATED COUNT: 14.1 % (ref 11.5–14.5)
GFR SERPL CREATININE-BSD FRML MDRD: 81 ML/MIN/1.73SQ M
GLUCOSE SERPL-MCNC: 94 MG/DL (ref 65–99)
HCT VFR BLD AUTO: 38.4 % (ref 42–47)
HGB BLD-MCNC: 13.1 G/DL (ref 12–16)
INR PPP: 0.9 (ref 0.84–1.19)
LYMPHOCYTES # BLD AUTO: 1.2 THOUSANDS/ΜL (ref 0.6–4.47)
LYMPHOCYTES NFR BLD AUTO: 22 % (ref 21–51)
MCH RBC QN AUTO: 30.2 PG (ref 26–34)
MCHC RBC AUTO-ENTMCNC: 34.2 G/DL (ref 31–37)
MCV RBC AUTO: 89 FL (ref 81–99)
MONOCYTES # BLD AUTO: 0.3 THOUSAND/ΜL (ref 0.17–1.22)
MONOCYTES NFR BLD AUTO: 6 % (ref 2–12)
NEUTROPHILS # BLD AUTO: 3.7 THOUSANDS/ΜL (ref 1.4–6.5)
NEUTS SEG NFR BLD AUTO: 68 % (ref 42–75)
PLATELET # BLD AUTO: 220 THOUSANDS/UL (ref 149–390)
PMV BLD AUTO: 6.6 FL (ref 8.6–11.7)
POTASSIUM SERPL-SCNC: 4.1 MMOL/L (ref 3.5–5.5)
PROT SERPL-MCNC: 6.2 G/DL (ref 6.4–8.9)
PROTHROMBIN TIME: 12.1 SECONDS (ref 11.6–14.5)
RBC # BLD AUTO: 4.33 MILLION/UL (ref 3.9–5.2)
SODIUM SERPL-SCNC: 141 MMOL/L (ref 134–143)
WBC # BLD AUTO: 5.5 THOUSAND/UL (ref 4.8–10.8)

## 2020-08-26 PROCEDURE — 85730 THROMBOPLASTIN TIME PARTIAL: CPT | Performed by: PHYSICIAN ASSISTANT

## 2020-08-26 PROCEDURE — 99283 EMERGENCY DEPT VISIT LOW MDM: CPT

## 2020-08-26 PROCEDURE — 85610 PROTHROMBIN TIME: CPT | Performed by: PHYSICIAN ASSISTANT

## 2020-08-26 PROCEDURE — 85025 COMPLETE CBC W/AUTO DIFF WBC: CPT | Performed by: PHYSICIAN ASSISTANT

## 2020-08-26 PROCEDURE — 93971 EXTREMITY STUDY: CPT

## 2020-08-26 PROCEDURE — 93971 EXTREMITY STUDY: CPT | Performed by: SURGERY

## 2020-08-26 PROCEDURE — 99284 EMERGENCY DEPT VISIT MOD MDM: CPT | Performed by: PHYSICIAN ASSISTANT

## 2020-08-26 PROCEDURE — 80053 COMPREHEN METABOLIC PANEL: CPT | Performed by: PHYSICIAN ASSISTANT

## 2020-08-26 PROCEDURE — 36415 COLL VENOUS BLD VENIPUNCTURE: CPT | Performed by: PHYSICIAN ASSISTANT

## 2020-08-26 NOTE — ED PROVIDER NOTES
History  Chief Complaint   Patient presents with    Leg Pain     lower left leg pain,  edema, redness, onset of a week     82-year-old female presents emergency department complaining of left lower leg pain and edema that has been worsening over the last week  She reports pain associated with this in the calf muscle of the left lower leg  Rest she is well-appearing in no acute distress  She reports having history of DVT previously several years ago requiring warfarin anticoagulation after a procedure on her ankle  She is noted to have severe varicose veins  She reports never having been seen by a vascular surgeon  She also describes bilateral leg achiness and heaviness that worsened throughout the day  Allergies reviewed          Prior to Admission Medications   Prescriptions Last Dose Informant Patient Reported? Taking?    Cyanocobalamin (VITAMIN B 12 PO)  Self Yes No   Sig: Take 1 tablet by mouth daily    cholecalciferol (VITAMIN D3) 1,000 units tablet  Self Yes No   Sig: Take 2,000 Units by mouth daily   ferrous sulfate (IRON SUPPLEMENT) 325 (65 Fe) mg tablet  Self Yes No   Sig: Take 325 mg by mouth daily with breakfast   levothyroxine 100 mcg tablet  Self Yes No   Sig: Take 100 mcg by mouth daily   mesalamine (PENTASA) 500 mg CR capsule   Yes No   Sig: Take 1,000 mg by mouth 4 (four) times a day   ondansetron (ZOFRAN-ODT) 4 mg disintegrating tablet  Self No No   Sig: Take 1 tablet (4 mg total) by mouth every 6 (six) hours as needed for nausea or vomiting   pantoprazole (PROTONIX) 40 mg tablet  Self Yes No   Sig: Take 40 mg by mouth daily    pramipexole (MIRAPEX) 0 5 mg tablet  Self Yes No   Sig: Take 0 5 mg by mouth 2 (two) times a day    predniSONE 1 mg tablet   Yes No   Sig: Take 50 mg by mouth daily    topiramate (TOPAMAX) 50 MG tablet  Self Yes No   Sig: Take 50 mg by mouth daily at bedtime       Facility-Administered Medications: None       Past Medical History:   Diagnosis Date    Bulging of cervical intervertebral disc     Celiac disease     Crohn disease ([facility] )     Disease of thyroid gland     Migraines     Neck pain, chronic     Polycystic ovarian syndrome     PONV (postoperative nausea and vomiting)     TMJ (temporomandibular joint syndrome)        Past Surgical History:   Procedure Laterality Date    ADENOIDECTOMY      ANKLE SURGERY Left     reconstruction     SECTION      x2    DIAGNOSTIC LAPAROSCOPY      HYSTERECTOMY      UT ARTHRODESIS ANT INTERBODY INC DISCECTOMY, CERVICAL BELOW C2 Bilateral 2019    Procedure: C5/6 ANTERIOR CERVICAL DECOMPRESSION AND FUSION;  Surgeon: Issa Evans MD;  [facility];  Service: Neurosurgery    TONSILLECTOMY      TUBAL LIGATION         Family History   Adopted: Yes     I have reviewed and agree with the history as documented  E-Cigarette/Vaping    E-Cigarette Use Never User      E-Cigarette/Vaping Substances    Nicotine No     THC No     CBD No     Flavoring No     Other No     Unknown No      Social History     Tobacco Use    Smoking status: Current Every Day Smoker     Packs/day: 1 00     Types: Cigarettes    Smokeless tobacco: Never Used   Substance Use Topics    Alcohol use: Never     Frequency: Never    Drug use: Never       Review of Systems   Constitutional: Negative for chills, fatigue and fever  HENT: Negative for congestion, ear pain, rhinorrhea, sinus pressure, sneezing, sore throat and trouble swallowing  Eyes: Negative for discharge and itching  Respiratory: Negative for cough, chest tightness, shortness of breath, wheezing and stridor  Cardiovascular: Negative for chest pain and palpitations  Gastrointestinal: Negative for abdominal pain, diarrhea, nausea and vomiting  Musculoskeletal: Positive for myalgias  Neurological: Negative for dizziness, syncope, numbness and headaches  All other systems reviewed and are negative        Physical Exam  Physical Exam  Vitals signs and nursing note reviewed  Constitutional:       General: She is not in acute distress  Appearance: She is well-developed  She is obese  She is not diaphoretic  HENT:      Head: Normocephalic and atraumatic  Right Ear: External ear normal       Left Ear: External ear normal       Nose: Nose normal    Eyes:      Conjunctiva/sclera: Conjunctivae normal       Pupils: Pupils are equal, round, and reactive to light  Neck:      Musculoskeletal: Normal range of motion  Cardiovascular:      Rate and Rhythm: Normal rate and regular rhythm  Heart sounds: Normal heart sounds  No murmur  No friction rub  No gallop  Pulmonary:      Effort: Pulmonary effort is normal  No respiratory distress  Breath sounds: Normal breath sounds  No stridor  No wheezing or rales  Abdominal:      General: Bowel sounds are normal  There is no distension  Palpations: Abdomen is soft  Tenderness: There is no abdominal tenderness  There is no guarding  Musculoskeletal: Normal range of motion  General: No tenderness  Legs:       Comments: Left calf tenderness  Lower extremity edema bilaterally  Skin:     General: Skin is warm  Capillary Refill: Capillary refill takes less than 2 seconds  Neurological:      Mental Status: She is alert and oriented to person, place, and time  Psychiatric:         Behavior: Behavior is cooperative           Vital Signs  ED Triage Vitals [08/26/20 0954]   Temperature Pulse Respirations Blood Pressure SpO2   97 9 °F (36 6 °C) 65 18 119/62 99 %      Temp Source Heart Rate Source Patient Position - Orthostatic VS BP Location FiO2 (%)   Temporal Monitor Sitting Left arm --      Pain Score       2           Vitals:    08/26/20 0954   BP: 119/62   Pulse: 65   Patient Position - Orthostatic VS: Sitting         Visual Acuity      ED Medications  Medications - No data to display    Diagnostic Studies  Results Reviewed     Procedure Component Value Units Date/Time Comprehensive metabolic panel [[id number]]  (Abnormal) Collected:  08/26/20 1104    Lab Status:  Final result Specimen:  Blood from Arm, Left Updated:  08/26/20 1128     Sodium 141 mmol/L      Potassium 4 1 mmol/L      Chloride 110 mmol/L      CO2 24 mmol/L      ANION GAP 7 mmol/L      BUN 14 mg/dL      Creatinine 0 85 mg/dL      Glucose 94 mg/dL      Calcium 9 4 mg/dL      AST 10 U/L      ALT 15 U/L      Alkaline Phosphatase 40 U/L      Total Protein 6 2 g/dL      Albumin 4 1 g/dL      Total Bilirubin 0 40 mg/dL      eGFR 81 ml/min/1 73sq m     Narrative:       National Kidney Disease Foundation guidelines for Chronic Kidney Disease (CKD):     Stage 1 with normal or high GFR (GFR > 90 mL/min/1 73 square meters)    Stage 2 Mild CKD (GFR = 60-89 mL/min/1 73 square meters)    Stage 3A Moderate CKD (GFR = 45-59 mL/min/1 73 square meters)    Stage 3B Moderate CKD (GFR = 30-44 mL/min/1 73 square meters)    Stage 4 Severe CKD (GFR = 15-29 mL/min/1 73 square meters)    Stage 5 End Stage CKD (GFR <15 mL/min/1 73 square meters)  Note: GFR calculation is accurate only with a steady state creatinine    Protime-INR [[id number]]  (Normal) Collected:  08/26/20 1104    Lab Status:  Final result Specimen:  Blood from Arm, Left Updated:  08/26/20 1123     Protime 12 1 seconds      INR 0 90    APTT [[id number]]  (Normal) Collected:  08/26/20 1104    Lab Status:  Final result Specimen:  Blood from Arm, Left Updated:  08/26/20 1123     PTT 26 seconds     CBC and differential [[id number]]  (Abnormal) Collected:  08/26/20 1104    Lab Status:  Final result Specimen:  Blood from Arm, Left Updated:  08/26/20 1112     WBC 5 50 Thousand/uL      RBC 4 33 Million/uL      Hemoglobin 13 1 g/dL      Hematocrit 38 4 %      MCV 89 fL      MCH 30 2 pg      MCHC 34 2 g/dL      RDW 14 1 %      MPV 6 6 fL      Platelets 584 Thousands/uL      Neutrophils Relative 68 %      Lymphocytes Relative 22 %      Monocytes Relative 6 %      Eosinophils Relative 3 %      Basophils Relative 1 %      Neutrophils Absolute 3 70 Thousands/µL      Lymphocytes Absolute 1 20 Thousands/µL      Monocytes Absolute 0 30 Thousand/µL      Eosinophils Absolute 0 20 Thousand/µL      Basophils Absolute 0 00 Thousands/µL                  VAS lower limb venous duplex study, unilateral/limited   Final Result by Davonte Colindres DO (08/26 1244)                 Procedures  Procedures         ED Course  ED Course as of Aug 26 1522   Wed Aug 26, 2020   1226 Left proximal peroneal dvt          US AUDIT      Most Recent Value   Initial Alcohol Screen: US AUDIT-C    1  How often do you have a drink containing alcohol?  0 Filed at: 08/26/2020 0959   2  How many drinks containing alcohol do you have on a typical day you are drinking? 0 Filed at: 08/26/2020 0959   3b  FEMALE Any Age, or MALE 65+: How often do you have 4 or more drinks on one occassion? 0 Filed at: 08/26/2020 0959   Audit-C Score  0 Filed at: 08/26/2020 9896                  TALAT/DAST-10      Most Recent Value   How many times in the past year have you    Used an illegal drug or used a prescription medication for non-medical reasons? Never Filed at: 08/26/2020 7875                                MDM  Number of Diagnoses or Management Options  DVT (deep venous thrombosis) (HCC):   Varicose veins of both lower extremities with pain:   Diagnosis management comments: Ultrasound reveals peroneal vein DVT  Patient initiated on Eliquis  Advised follow-up with vascular surgery  Ambulatory or referral to vascular surgeon provided  Patient educated regarding varicose veins  Advised to wear compression stockings  Patient verbalized understanding need for compression stockings  Patient states she will be following up with vascular surgery  Patient educated regarding their diagnosis and given return and follow-up instructions  Patient is understanding and in agreement with the treatment plan    There are no questions at the time of discharge  Amount and/or Complexity of Data Reviewed  Clinical lab tests: ordered and reviewed  Tests in the radiology section of CPT®: ordered and reviewed    Risk of Complications, Morbidity, and/or Mortality  Presenting problems: moderate  Diagnostic procedures: low  Management options: low    Patient Progress  Patient progress: stable        Disposition  Final diagnoses:   DVT (deep venous thrombosis) ([facility] )   Varicose veins of both lower extremities with pain     Time reflects when diagnosis was documented in both MDM as applicable and the Disposition within this note     Time User Action Codes Description Comment    8/26/2020 12:34 PM Diannia [place] Add [I82 [id number]] DVT (deep venous thrombosis) ([facility] )     8/26/2020 12:35 PM Diannia [place] Add [I83 [id number]] Varicose veins of both lower extremities with pain       ED Disposition     ED Disposition Condition Date/Time Comment    Discharge Stable Wed Aug 26, 2020 12:35 PM Ricky Mari discharge to home/self care  Follow-up Information     Follow up With Specialties Details Why Contact Info Additional Information    [facility] - [address] Vascular Surgery   [address] [place] [id number]  [address] [place] [address], [address] [facility], [address] [place], [place], [id number], [phone]          Discharge Medication List as of 8/26/2020 12:35 PM      START taking these medications    Details   apixaban (Eliquis DVT/PE Starter Pack) 5 mg Multiple Dosages:Starting Wed 8/26/2020, Last dose on Tue 9/1/2020, THEN Starting Wed 9/2/2020, Last dose on Thu 9/24/2020Take 2 tablets (10 mg total) by mouth 2 (two) times a day for 7 days, THEN 1 tablet (5 mg total) 2 (two) times a day for 23 days   , Normal         CONTINUE these medications which have NOT CHANGED    Details   cholecalciferol (VITAMIN D3) 1,000 units tablet Take 2,000 Units by mouth daily, Historical Med      Cyanocobalamin (VITAMIN B 12 PO) Take 1 tablet by mouth daily , Historical Med      ferrous sulfate (IRON SUPPLEMENT) 325 (65 Fe) mg tablet Take 325 mg by mouth daily with breakfast, Historical Med      levothyroxine 100 mcg tablet Take 100 mcg by mouth daily, Historical Med      mesalamine (PENTASA) 500 mg CR capsule Take 1,000 mg by mouth 4 (four) times a day, Historical Med      ondansetron (ZOFRAN-ODT) 4 mg disintegrating tablet Take 1 tablet (4 mg total) by mouth every 6 (six) hours as needed for nausea or vomiting, Starting Sun 3/15/2020, Normal      pantoprazole (PROTONIX) 40 mg tablet Take 40 mg by mouth daily , Starting Wed 5/13/2020, Historical Med      pramipexole (MIRAPEX) 0 5 mg tablet Take 0 5 mg by mouth 2 (two) times a day , Historical Med      predniSONE 1 mg tablet Take 50 mg by mouth daily , Historical Med      topiramate (TOPAMAX) 50 MG tablet Take 50 mg by mouth daily at bedtime , Starting Thu 4/25/2019, Historical Med           Outpatient Discharge Orders   TEDS Stockings       PDMP Review     None          ED Provider  Electronically Signed by           Lacy Gay PA-C  08/26/20 6331

## 2020-09-03 ENCOUNTER — OFFICE VISIT (OUTPATIENT)
Dept: URGENT CARE | Facility: CLINIC | Age: 50
End: 2020-09-03
Payer: COMMERCIAL

## 2020-09-03 ENCOUNTER — APPOINTMENT (OUTPATIENT)
Dept: RADIOLOGY | Facility: CLINIC | Age: 50
End: 2020-09-03
Payer: COMMERCIAL

## 2020-09-03 VITALS
TEMPERATURE: 97.4 F | SYSTOLIC BLOOD PRESSURE: 124 MMHG | DIASTOLIC BLOOD PRESSURE: 66 MMHG | RESPIRATION RATE: 18 BRPM | WEIGHT: 270 LBS | OXYGEN SATURATION: 100 % | HEART RATE: 67 BPM | BODY MASS INDEX: 38.65 KG/M2 | HEIGHT: 70 IN

## 2020-09-03 DIAGNOSIS — M25.562 ACUTE PAIN OF LEFT KNEE: Primary | ICD-10-CM

## 2020-09-03 DIAGNOSIS — M25.562 ACUTE PAIN OF LEFT KNEE: ICD-10-CM

## 2020-09-03 PROCEDURE — 99213 OFFICE O/P EST LOW 20 MIN: CPT | Performed by: NURSE PRACTITIONER

## 2020-09-03 PROCEDURE — 73562 X-RAY EXAM OF KNEE 3: CPT

## 2020-09-03 NOTE — PROGRESS NOTES
[address]        NAME: Adelfo Sanchez is a 52 y o  female  : 1970    MRN: [id number]  DATE: September 3, 2020  TIME: 3:13 PM    Assessment and Plan   Acute pain of left knee [M25 562]  1  Acute pain of left knee  XR knee 3 vw left non injury     No acute osseous abnormality per my read  Ace wrap applied  Patient Instructions     Patient Instructions   Recommend over the counter Tylenol as needed for pain  Rest, ice, and elevate  Wear Ace wrap as directed  If you develop any increased pain, swelling, redness, calf pain or swelling, shortness of breath, chest pain, or any new or concerning symptoms please return or proceed ER  Advised follow-up PCP in 3-5 days    Knee Pain   WHAT YOU NEED TO KNOW:   Knee pain may start suddenly, or it may be a long-term problem  You may have pain on the side, front, or back of your knee  You may have knee stiffness and swelling  You may hear popping sounds or feel like your knee is giving way or locking up as you walk  You may feel pain when you sit, stand, walk, or climb up and down stairs  Knee pain can be caused by conditions such as obesity, inflammation, or strains or tears in ligaments or tendons  DISCHARGE INSTRUCTIONS:   Follow up with your healthcare provider within 24 hours or as directed: You may need follow-up treatments, such as steroid injections to decrease pain  Write down your questions so you remember to ask them during your visits  Self-care:   · Rest  your knee so it can heal  Limit activities that increase your pain  · Ice  can help reduce swelling  Wrap ice in a towel and put it on your knee for as long and as often as directed  · Compression  with a brace or bandage can help reduce swelling  Use a brace or bandage only as directed  · Elevation  helps decrease pain and swelling  Elevate your knee while you are sitting or lying down  Prop your leg on pillows to keep your knee above the level of your heart    Medicines: · NSAIDs  help decrease swelling and pain or fever  This medicine is available with or without a doctor's order  NSAIDs can cause stomach bleeding or kidney problems in certain people  If you take blood thinner medicine, always ask your healthcare provider if NSAIDs are safe for you  Always read the medicine label and follow directions  · Acetaminophen  decreases pain and fever  It is available without a doctor's order  Ask how much to take and when to take it  Follow directions  Acetaminophen can cause liver damage if not taken correctly  · Take your medicine as directed  Contact your healthcare provider if you think your medicine is not helping or if you have side effects  Tell him or her if you are allergic to any medicine  Keep a list of the medicines, vitamins, and herbs you take  Include the amounts, and when and why you take them  Bring the list or the pill bottles to follow-up visits  Carry your medicine list with you in case of an emergency  Exercise as directed: You may need to see a physical therapist or do recommended exercises to improve movement and decrease your pain  You may be directed to walk, swim, or ride a bike  Follow your exercise plan exactly as directed to avoid further injury  Contact your healthcare provider if:   · You have questions or concerns about your condition or care  Return to the emergency department if:   · Your pain is worse, even after treatment  · You cannot bend or straighten your leg completely  · The swelling around your knee does not go down even with treatment  · Your knee is painful and hot to the touch  © 2017 [address] Information is for End User's use only and may not be sold, redistributed or otherwise used for commercial purposes  All illustrations and images included in CareNotes® are the copyrighted property of [employer] , [employer]  or [employer]  The above information is an  only   It is not intended as medical advice for individual conditions or treatments  Talk to your doctor, nurse or pharmacist before following any medical regimen to see if it is safe and effective for you  Follow up with PCP in 3-5 days  Proceed to  ER if symptoms worsen  Chief Complaint     Chief Complaint   Patient presents with    Knee Pain     left, pt states her kneecap is moving, and has fluid, recent DVT, on elaquis         History of Present Illness       Patient is a 24-year-old female presents with a 2 week history of left knee pain and swelling  Patient denies injury or trauma  Has not tried any over-the-counter medication  Patient states she was diagnosed with a DVT in her left calf 1 week ago in the ER  Was started on Eliquis  Patient states she did not mention knee pain at time of visit  Patient has history of cortisone injections to bilateral knees  Denies any fever, chills, or body aches  Denies any redness or warmth  Denies any numbness, tingling, decreased sensation,or  decreased range of motion of left lower extremity  Patient denies any calf pain or swelling  Denies any chest pain, shortness of breath, or palpitations  Review of Systems   Review of Systems   Constitutional: Negative for chills, diaphoresis, fatigue and fever  HENT: Negative  Respiratory: Negative for cough, chest tightness, shortness of breath, wheezing and stridor  Cardiovascular: Negative for chest pain, palpitations and leg swelling  Gastrointestinal: Negative  Musculoskeletal: Positive for arthralgias and joint swelling  Negative for back pain, gait problem, myalgias, neck pain and neck stiffness  Skin: Negative for rash and wound  Neurological: Negative for dizziness, syncope, weakness, light-headedness, numbness and headaches           Current Medications       Current Outpatient Medications:     apixaban (Eliquis DVT/PE Starter Pack) 5 mg, Take 2 tablets (10 mg total) by mouth 2 (two) times a day for 7 days, THEN 1 tablet (5 mg total) 2 (two) times a day for 23 days  , Disp: 74 tablet, Rfl: 0    cholecalciferol (VITAMIN D3) 1,000 units tablet, Take 2,000 Units by mouth daily, Disp: , Rfl:     Cyanocobalamin (VITAMIN B 12 PO), Take 1 tablet by mouth daily , Disp: , Rfl:     ferrous sulfate (IRON SUPPLEMENT) 325 (65 Fe) mg tablet, Take 325 mg by mouth daily with breakfast, Disp: , Rfl:     levothyroxine 100 mcg tablet, Take 100 mcg by mouth daily, Disp: , Rfl:     mesalamine (PENTASA) 500 mg CR capsule, Take 1,000 mg by mouth 4 (four) times a day, Disp: , Rfl:     pantoprazole (PROTONIX) 40 mg tablet, Take 40 mg by mouth daily , Disp: , Rfl:     pramipexole (MIRAPEX) 0 5 mg tablet, Take 0 5 mg by mouth 2 (two) times a day , Disp: , Rfl:     topiramate (TOPAMAX) 50 MG tablet, Take 50 mg by mouth daily at bedtime , Disp: , Rfl:     Current Allergies     Allergies as of 09/03/2020 - Reviewed 09/03/2020   Allergen Reaction Noted    Amoxicillin Rash 05/05/2018    Codeine Rash and Other (See Comments) 05/05/2018    Diclofenac Rash 04/24/2019    Penicillins Other (See Comments) 05/05/2018    Sulfa antibiotics Other (See Comments) 05/05/2018    Diclofenac sodium Rash 04/08/2019    Etodolac Hives, Rash, and Other (See Comments) 06/01/2018            The following portions of the patient's history were reviewed and updated as appropriate: allergies, current medications, past family history, past medical history, past social history, past surgical history and problem list      Past Medical History:   Diagnosis Date    Bulging of cervical intervertebral disc     Celiac disease     Crohn disease ([facility] [address] )     Disease of thyroid gland     DVT (deep venous thrombosis) (HCC)     Migraines     Neck pain, chronic     Polycystic ovarian syndrome     PONV (postoperative nausea and vomiting)     TMJ (temporomandibular joint syndrome)        Past Surgical History:   Procedure Laterality Date    ADENOIDECTOMY      ANKLE SURGERY Left     reconstruction     SECTION      x2    DIAGNOSTIC LAPAROSCOPY      HYSTERECTOMY      PA ARTHRODESIS ANT INTERBODY INC DISCECTOMY, CERVICAL BELOW C2 Bilateral 2019    Procedure: C5/6 ANTERIOR CERVICAL DECOMPRESSION AND FUSION;  Surgeon: Clayton Hammer MD;  Location: [facility];  Service: Neurosurgery    TONSILLECTOMY      TUBAL LIGATION         Family History   Adopted: Yes         Medications have been verified  Objective   /66   Pulse 67   Temp (!) 97 4 °F (36 3 °C) (Temporal)   Resp 18   Ht 5' 10" (1 778 m)   Wt 122 kg (270 lb)   SpO2 100%   BMI 38 74 kg/m²        Physical Exam     Physical Exam  Constitutional:       General: She is not in acute distress  Appearance: Normal appearance  She is not diaphoretic  Cardiovascular:      Rate and Rhythm: Normal rate and regular rhythm  Pulses: Normal pulses  Dorsalis pedis pulses are 2+ on the right side and 2+ on the left side  Heart sounds: Normal heart sounds, S1 normal and S2 normal    Pulmonary:      Effort: Pulmonary effort is normal       Breath sounds: Normal breath sounds and air entry  Musculoskeletal:      Left knee: She exhibits effusion and bony tenderness  She exhibits normal range of motion, no swelling, no ecchymosis, no deformity, no erythema, normal patellar mobility and normal meniscus  Tenderness found  Medial joint line tenderness noted  No lateral joint line, no MCL, no LCL and no patellar tendon tenderness noted  Legs:    Skin:     General: Skin is warm and dry  Capillary Refill: Capillary refill takes less than 2 seconds  Neurological:      Mental Status: She is alert

## 2020-09-03 NOTE — PATIENT INSTRUCTIONS
Recommend over the counter Tylenol as needed for pain  Rest, ice, and elevate  Wear Ace wrap as directed  If you develop any increased pain, swelling, redness, calf pain or swelling, shortness of breath, chest pain, or any new or concerning symptoms please return or proceed ER  Advised follow-up PCP in 3-5 days    Knee Pain   WHAT YOU NEED TO KNOW:   Knee pain may start suddenly, or it may be a long-term problem  You may have pain on the side, front, or back of your knee  You may have knee stiffness and swelling  You may hear popping sounds or feel like your knee is giving way or locking up as you walk  You may feel pain when you sit, stand, walk, or climb up and down stairs  Knee pain can be caused by conditions such as obesity, inflammation, or strains or tears in ligaments or tendons  DISCHARGE INSTRUCTIONS:   Follow up with your healthcare provider within 24 hours or as directed: You may need follow-up treatments, such as steroid injections to decrease pain  Write down your questions so you remember to ask them during your visits  Self-care:   · Rest  your knee so it can heal  Limit activities that increase your pain  · Ice  can help reduce swelling  Wrap ice in a towel and put it on your knee for as long and as often as directed  · Compression  with a brace or bandage can help reduce swelling  Use a brace or bandage only as directed  · Elevation  helps decrease pain and swelling  Elevate your knee while you are sitting or lying down  Prop your leg on pillows to keep your knee above the level of your heart  Medicines:   · NSAIDs  help decrease swelling and pain or fever  This medicine is available with or without a doctor's order  NSAIDs can cause stomach bleeding or kidney problems in certain people  If you take blood thinner medicine, always ask your healthcare provider if NSAIDs are safe for you  Always read the medicine label and follow directions      · Acetaminophen  decreases pain and fever  It is available without a doctor's order  Ask how much to take and when to take it  Follow directions  Acetaminophen can cause liver damage if not taken correctly  · Take your medicine as directed  Contact your healthcare provider if you think your medicine is not helping or if you have side effects  Tell him or her if you are allergic to any medicine  Keep a list of the medicines, vitamins, and herbs you take  Include the amounts, and when and why you take them  Bring the list or the pill bottles to follow-up visits  Carry your medicine list with you in case of an emergency  Exercise as directed: You may need to see a physical therapist or do recommended exercises to improve movement and decrease your pain  You may be directed to walk, swim, or ride a bike  Follow your exercise plan exactly as directed to avoid further injury  Contact your healthcare provider if:   · You have questions or concerns about your condition or care  Return to the emergency department if:   · Your pain is worse, even after treatment  · You cannot bend or straighten your leg completely  · The swelling around your knee does not go down even with treatment  · Your knee is painful and hot to the touch  © 2017 [address]  Information is for End User's use only and may not be sold, redistributed or otherwise used for commercial purposes  All illustrations and images included in CareNotes® are the copyrighted property of [employer]  or [employer]  The above information is an  only  It is not intended as medical advice for individual conditions or treatments  Talk to your doctor, nurse or pharmacist before following any medical regimen to see if it is safe and effective for you

## 2020-09-04 ENCOUNTER — OFFICE VISIT (OUTPATIENT)
Dept: SURGERY | Facility: CLINIC | Age: 50
End: 2020-09-04
Payer: COMMERCIAL

## 2020-09-04 VITALS
HEART RATE: 68 BPM | HEIGHT: 70 IN | SYSTOLIC BLOOD PRESSURE: 132 MMHG | DIASTOLIC BLOOD PRESSURE: 78 MMHG | RESPIRATION RATE: 18 BRPM | BODY MASS INDEX: 39.37 KG/M2 | WEIGHT: 275 LBS | TEMPERATURE: 97.3 F

## 2020-09-04 DIAGNOSIS — R10.813 RIGHT LOWER QUADRANT ABDOMINAL TENDERNESS WITHOUT REBOUND TENDERNESS: Primary | ICD-10-CM

## 2020-09-04 PROCEDURE — 99213 OFFICE O/P EST LOW 20 MIN: CPT | Performed by: PHYSICIAN ASSISTANT

## 2020-09-04 NOTE — ASSESSMENT & PLAN NOTE
Pleasant 61-year-old female here for follow-up evaluation of right lower quadrant abdominal pain  Since her last visit has been evaluated by Gastroenterology and initiated on Pentasa for Crohn's disease  Since that time has had significant improvement of her symptoms  Currently there are plans to start a biologic for better control  On exam she is well appearing, her abdomen is soft and minimally tender in the right lower quadrant  At this time a advised continued treatment for her Crohn's disease via Gastroenterology  No additional treatment or testing at this time  Can follow-up with us as needed  Previously documented appendiceal inflammation at 1 cm that resolved on subsequent CT presumably related to her Crohn's via secondary inflammation vs extension vs isolated Appendiceal Crohn's vs transient self-limited appendicitis  Did not advise elective appendectomy at this time  If clinical concern persists after treatment of Crohn's would repeat CT to evaluate further

## 2020-09-04 NOTE — PROGRESS NOTES
Progress Note - General Surgery   Shannon Minaya 52 y o  female MRN: [id number]  Encounter: [id number]    Assessment/Plan    RLQ abdominal tenderness  Pleasant 68-year-old female here for follow-up evaluation of right lower quadrant abdominal pain  Since her last visit has been evaluated by Gastroenterology and initiated on Pentasa for Crohn's disease  Since that time has had significant improvement of her symptoms  Currently there are plans to start a biologic for better control  On exam she is well appearing, her abdomen is soft and minimally tender in the right lower quadrant  At this time a advised continued treatment for her Crohn's disease via Gastroenterology  No additional treatment or testing at this time  Can follow-up with us as needed  Previously documented appendiceal inflammation at 1 cm that resolved on subsequent CT presumably related to her Crohn's via secondary inflammation vs extension vs isolated Appendiceal Crohn's vs transient self-limited appendicitis  Did not advise elective appendectomy at this time  If clinical concern persists after treatment of Crohn's would repeat CT to evaluate further  Diagnoses and all orders for this visit:    Right lower quadrant abdominal tenderness without rebound tenderness        Subjective       Chief Complaint   Patient presents with    Follow-up     3mo f/u rlq abdominal pain w/tenderness      Patient is here today 3 month follow up RLQ abdominal pain with tenderness  Patient reports that she is doing well with no new problems or concerns  Jessa Ospina    Lena 53 yo F here for follow-up regarding RLQ abdominal pain  Since her last visit has initiated treatment with Pentasa with plans to start biologic therapy  Since starting Pentasa has noted significant improvement of symptoms  Flares are less frequent and less severe  Review of Systems   Constitutional: Negative for chills and fever  Skin: Negative for color change and rash  The following portions of the patient's history were reviewed and updated as appropriate: allergies, current medications, past family history, past medical history, past social history, past surgical history and problem list     Objective      Blood pressure 132/78, pulse 68, temperature (!) 97 3 °F (36 3 °C), temperature source Temporal, resp  rate 18, height 5' 10" (1 778 m), weight 125 kg (275 lb), not currently breastfeeding  Physical Exam  Vitals signs and nursing note reviewed  Constitutional:       General: She is not in acute distress  Appearance: She is well-developed  She is not diaphoretic  HENT:      Head: Normocephalic and atraumatic  Eyes:      Conjunctiva/sclera: Conjunctivae normal       Pupils: Pupils are equal, round, and reactive to light  Neck:      Musculoskeletal: Normal range of motion  Pulmonary:      Effort: No respiratory distress  Abdominal:      General: Abdomen is flat  Bowel sounds are normal  There is no distension  Palpations: Abdomen is soft  Tenderness: There is abdominal tenderness (mild RLQ)  Musculoskeletal: Normal range of motion  Skin:     General: Skin is warm and dry  Capillary Refill: Capillary refill takes less than 2 seconds  Neurological:      Mental Status: She is alert and oriented to person, place, and time     Psychiatric:         Behavior: Behavior normal          Signature:  Theo Bravo PA-C  Date: 9/4/2020 Time: 12:48 PM

## 2020-09-07 ENCOUNTER — APPOINTMENT (EMERGENCY)
Dept: CT IMAGING | Facility: HOSPITAL | Age: 50
End: 2020-09-07
Payer: COMMERCIAL

## 2020-09-07 ENCOUNTER — HOSPITAL ENCOUNTER (EMERGENCY)
Facility: HOSPITAL | Age: 50
Discharge: HOME | End: 2020-09-08
Attending: EMERGENCY MEDICINE | Admitting: EMERGENCY MEDICINE
Payer: COMMERCIAL

## 2020-09-07 DIAGNOSIS — N17.9 AKI (ACUTE KIDNEY INJURY) (HCC): ICD-10-CM

## 2020-09-07 DIAGNOSIS — M62.838 MUSCLE SPASMS OF NECK: Primary | ICD-10-CM

## 2020-09-07 LAB
ALBUMIN SERPL BCP-MCNC: 4.1 G/DL (ref 3.5–5.7)
ALP SERPL-CCNC: 45 U/L (ref 40–150)
ALT SERPL W P-5'-P-CCNC: 9 U/L (ref 7–52)
ANION GAP SERPL CALCULATED.3IONS-SCNC: 6 MMOL/L (ref 4–13)
APTT PPP: 29 SECONDS (ref 23–37)
AST SERPL W P-5'-P-CCNC: 7 U/L (ref 13–39)
BASOPHILS # BLD AUTO: 0 THOUSANDS/ΜL (ref 0–0.1)
BASOPHILS NFR BLD AUTO: 1 % (ref 0–2)
BILIRUB SERPL-MCNC: 0.2 MG/DL (ref 0.2–1)
BUN SERPL-MCNC: 17 MG/DL (ref 7–25)
CALCIUM SERPL-MCNC: 9.4 MG/DL (ref 8.6–10.5)
CHLORIDE SERPL-SCNC: 109 MMOL/L (ref 98–107)
CO2 SERPL-SCNC: 24 MMOL/L (ref 21–31)
CREAT SERPL-MCNC: 1.25 MG/DL (ref 0.6–1.2)
EOSINOPHIL # BLD AUTO: 0.2 THOUSAND/ΜL (ref 0–0.61)
EOSINOPHIL NFR BLD AUTO: 3 % (ref 0–5)
ERYTHROCYTE [DISTWIDTH] IN BLOOD BY AUTOMATED COUNT: 13.5 % (ref 11.5–14.5)
GFR SERPL CREATININE-BSD FRML MDRD: 51 ML/MIN/1.73SQ M
GLUCOSE SERPL-MCNC: 106 MG/DL (ref 65–99)
HCT VFR BLD AUTO: 38.9 % (ref 42–47)
HGB BLD-MCNC: 13.2 G/DL (ref 12–16)
INR PPP: 1.01 (ref 0.84–1.19)
LYMPHOCYTES # BLD AUTO: 2.3 THOUSANDS/ΜL (ref 0.6–4.47)
LYMPHOCYTES NFR BLD AUTO: 31 % (ref 21–51)
MAGNESIUM SERPL-MCNC: 2 MG/DL (ref 1.9–2.7)
MCH RBC QN AUTO: 30 PG (ref 26–34)
MCHC RBC AUTO-ENTMCNC: 33.8 G/DL (ref 31–37)
MCV RBC AUTO: 89 FL (ref 81–99)
MONOCYTES # BLD AUTO: 0.4 THOUSAND/ΜL (ref 0.17–1.22)
MONOCYTES NFR BLD AUTO: 5 % (ref 2–12)
NEUTROPHILS # BLD AUTO: 4.4 THOUSANDS/ΜL (ref 1.4–6.5)
NEUTS SEG NFR BLD AUTO: 60 % (ref 42–75)
PLATELET # BLD AUTO: 268 THOUSANDS/UL (ref 149–390)
PMV BLD AUTO: 6.9 FL (ref 8.6–11.7)
POTASSIUM SERPL-SCNC: 3.6 MMOL/L (ref 3.5–5.5)
PROT SERPL-MCNC: 6.3 G/DL (ref 6.4–8.9)
PROTHROMBIN TIME: 13.2 SECONDS (ref 11.6–14.5)
RBC # BLD AUTO: 4.39 MILLION/UL (ref 3.9–5.2)
SODIUM SERPL-SCNC: 139 MMOL/L (ref 134–143)
WBC # BLD AUTO: 7.3 THOUSAND/UL (ref 4.8–10.8)

## 2020-09-07 PROCEDURE — 99284 EMERGENCY DEPT VISIT MOD MDM: CPT

## 2020-09-07 PROCEDURE — 70498 CT ANGIOGRAPHY NECK: CPT

## 2020-09-07 PROCEDURE — 80053 COMPREHEN METABOLIC PANEL: CPT | Performed by: EMERGENCY MEDICINE

## 2020-09-07 PROCEDURE — 85730 THROMBOPLASTIN TIME PARTIAL: CPT | Performed by: EMERGENCY MEDICINE

## 2020-09-07 PROCEDURE — 85025 COMPLETE CBC W/AUTO DIFF WBC: CPT | Performed by: EMERGENCY MEDICINE

## 2020-09-07 PROCEDURE — 36415 COLL VENOUS BLD VENIPUNCTURE: CPT | Performed by: EMERGENCY MEDICINE

## 2020-09-07 PROCEDURE — 83735 ASSAY OF MAGNESIUM: CPT | Performed by: EMERGENCY MEDICINE

## 2020-09-07 PROCEDURE — 96374 THER/PROPH/DIAG INJ IV PUSH: CPT

## 2020-09-07 PROCEDURE — 85610 PROTHROMBIN TIME: CPT | Performed by: EMERGENCY MEDICINE

## 2020-09-07 PROCEDURE — 99284 EMERGENCY DEPT VISIT MOD MDM: CPT | Performed by: EMERGENCY MEDICINE

## 2020-09-07 PROCEDURE — G1004 CDSM NDSC: HCPCS

## 2020-09-07 PROCEDURE — 96375 TX/PRO/DX INJ NEW DRUG ADDON: CPT

## 2020-09-07 PROCEDURE — 96361 HYDRATE IV INFUSION ADD-ON: CPT

## 2020-09-07 PROCEDURE — 70496 CT ANGIOGRAPHY HEAD: CPT

## 2020-09-07 RX ORDER — METHYLPREDNISOLONE SODIUM SUCCINATE 125 MG/2ML
125 INJECTION, POWDER, LYOPHILIZED, FOR SOLUTION INTRAMUSCULAR; INTRAVENOUS ONCE
Status: COMPLETED | OUTPATIENT
Start: 2020-09-07 | End: 2020-09-07

## 2020-09-07 RX ORDER — LIDOCAINE HYDROCHLORIDE AND EPINEPHRINE 10; 10 MG/ML; UG/ML
10 INJECTION, SOLUTION INFILTRATION; PERINEURAL ONCE
Status: COMPLETED | OUTPATIENT
Start: 2020-09-07 | End: 2020-09-07

## 2020-09-07 RX ORDER — LORAZEPAM 2 MG/ML
0.5 INJECTION INTRAMUSCULAR ONCE
Status: COMPLETED | OUTPATIENT
Start: 2020-09-07 | End: 2020-09-07

## 2020-09-07 RX ORDER — LIDOCAINE 50 MG/G
1 PATCH TOPICAL ONCE
Status: DISCONTINUED | OUTPATIENT
Start: 2020-09-07 | End: 2020-09-08 | Stop reason: HOSPADM

## 2020-09-07 RX ORDER — MORPHINE SULFATE 4 MG/ML
4 INJECTION, SOLUTION INTRAMUSCULAR; INTRAVENOUS ONCE
Status: COMPLETED | OUTPATIENT
Start: 2020-09-07 | End: 2020-09-07

## 2020-09-07 RX ADMIN — LIDOCAINE 1 PATCH: 50 PATCH CUTANEOUS at 22:16

## 2020-09-07 RX ADMIN — IOHEXOL 85 ML: 350 INJECTION, SOLUTION INTRAVENOUS at 22:58

## 2020-09-07 RX ADMIN — LORAZEPAM 0.5 MG: 2 INJECTION INTRAMUSCULAR; INTRAVENOUS at 22:57

## 2020-09-07 RX ADMIN — MORPHINE SULFATE 4 MG: 4 INJECTION INTRAVENOUS at 22:15

## 2020-09-07 RX ADMIN — METHYLPREDNISOLONE SODIUM SUCCINATE 125 MG: 125 INJECTION, POWDER, FOR SOLUTION INTRAMUSCULAR; INTRAVENOUS at 22:15

## 2020-09-07 RX ADMIN — LIDOCAINE HYDROCHLORIDE,EPINEPHRINE BITARTRATE 10 ML: 10; .01 INJECTION, SOLUTION INFILTRATION; PERINEURAL at 22:57

## 2020-09-07 RX ADMIN — SODIUM CHLORIDE 1000 ML: 0.9 INJECTION, SOLUTION INTRAVENOUS at 23:50

## 2020-09-08 VITALS
TEMPERATURE: 98 F | DIASTOLIC BLOOD PRESSURE: 58 MMHG | RESPIRATION RATE: 14 BRPM | HEART RATE: 57 BPM | SYSTOLIC BLOOD PRESSURE: 115 MMHG | OXYGEN SATURATION: 95 % | HEIGHT: 70 IN | BODY MASS INDEX: 38.65 KG/M2 | WEIGHT: 270 LBS

## 2020-09-08 PROCEDURE — 96361 HYDRATE IV INFUSION ADD-ON: CPT

## 2020-09-08 RX ORDER — LIDOCAINE 50 MG/G
1 PATCH TOPICAL DAILY
Qty: 30 PATCH | Refills: 0 | Status: SHIPPED | OUTPATIENT
Start: 2020-09-08 | End: 2021-04-28 | Stop reason: ALTCHOICE

## 2020-09-08 RX ORDER — METHYLPREDNISOLONE 4 MG/1
TABLET ORAL
Qty: 21 TABLET | Refills: 0 | Status: SHIPPED | OUTPATIENT
Start: 2020-09-08 | End: 2020-09-17 | Stop reason: ALTCHOICE

## 2020-09-08 RX ORDER — HYDROCODONE BITARTRATE AND ACETAMINOPHEN 5; 325 MG/1; MG/1
1 TABLET ORAL EVERY 6 HOURS PRN
Qty: 10 TABLET | Refills: 0 | Status: SHIPPED | OUTPATIENT
Start: 2020-09-08 | End: 2020-09-28

## 2020-09-08 NOTE — DISCHARGE INSTRUCTIONS
RETURN IF WORSE IN ANY WAY, OR NEW AND CONCERNING SYMPTOMS SIGNS OR SYMPTOMS:  INCREASED PAIN, INCREASED SWELLING  Loss of Bowel or bladder, numbness around the anus, difficulty urinating    APPLY ICE Or Heat AS NEEDED      PLEASE CALL YOUR PRIMARY DOCTOR IN THE MORNING TO SET UP FOLLOW UP   Please review the results of your work up  IN particular, please have your doctor review your Kidney function     USE EXTREME CAUTION WHILE TAKING Muscle relaxants FOR PAIN  ONLY TAKE FOR ACUTE PAIN  NEVER TAKE WITH OTHER SEDATIVE MEDICATIONS OR SUBSTANCES, SUCH AS SLEEPING MEDICATIONS OR ALCOHOL OR OTHER SUCH SUBSTANCES  YOU MAY NEED TO TAKE LAXATIVES WHILE TAKING THIS MEDICATION  PLEASE DISCUSS THE ABOVE WITH Shelby Joiner
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
## 2020-09-11 ENCOUNTER — TRANSCRIBE ORDERS (OUTPATIENT)
Dept: NEUROSURGERY | Facility: CLINIC | Age: 50
End: 2020-09-11

## 2020-09-11 ENCOUNTER — TELEPHONE (OUTPATIENT)
Dept: NEUROSURGERY | Facility: CLINIC | Age: 50
End: 2020-09-11

## 2020-09-11 DIAGNOSIS — M48.02 CERVICAL SPINAL STENOSIS: Primary | ICD-10-CM

## 2020-09-11 DIAGNOSIS — M54.12 RADICULOPATHY, CERVICAL: ICD-10-CM
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
## 2020-09-14 ENCOUNTER — HOSPITAL ENCOUNTER (OUTPATIENT)
Dept: RADIOLOGY | Facility: HOSPITAL | Age: 50
Discharge: HOME | End: 2020-09-14
Payer: COMMERCIAL

## 2020-09-14 DIAGNOSIS — M54.12 RADICULOPATHY, CERVICAL: ICD-10-CM

## 2020-09-14 DIAGNOSIS — M48.02 CERVICAL SPINAL STENOSIS: ICD-10-CM

## 2020-09-14 PROCEDURE — 72040 X-RAY EXAM NECK SPINE 2-3 VW: CPT

## 2020-09-17 ENCOUNTER — OFFICE VISIT (OUTPATIENT)
Dept: NEUROSURGERY | Facility: CLINIC | Age: 50
End: 2020-09-17
Payer: COMMERCIAL

## 2020-09-17 VITALS
RESPIRATION RATE: 16 BRPM | SYSTOLIC BLOOD PRESSURE: 113 MMHG | HEART RATE: 61 BPM | TEMPERATURE: 97.3 F | HEIGHT: 70 IN | DIASTOLIC BLOOD PRESSURE: 76 MMHG | WEIGHT: 277.2 LBS | BODY MASS INDEX: 39.69 KG/M2

## 2020-09-17 DIAGNOSIS — M54.12 RADICULOPATHY, CERVICAL: Primary | ICD-10-CM

## 2020-09-17 PROCEDURE — 99214 OFFICE O/P EST MOD 30 MIN: CPT | Performed by: PHYSICIAN ASSISTANT

## 2020-09-17 NOTE — PROGRESS NOTES
Office Note - Neurosurgery   Rut Manrique 52 y o  female MRN: [id number]      Assessment: This is a 52years old woman status post C5-6 ACDF on 04/30/2019 by Dr Soahil Suggs, here today complaining of posterior cervical pain radiating down to her right upper extremity to the ring and pinky fingers  Patient noticed pain started following jolting movement while in car, denies any hitting head, injury or fall down incident  Pain is stabbing started from the shoulder blade and down to her upper extremities, R>L side  Associated with paresthesia of right ring and pinky fingers  Denies any numbness  Reports occasional weakness in the hand  Patient also reports slight wobbling gait  No bowel bladder dysfunction  Tried Tylenol and lidocaine patch and home exercise without any benefit  Denies NATACHA/official PT  Exam-patient alert and oriented x3  Head communicates well  EOMI 2 mm conjugate bilaterally  Moves all extremities  Slight weakness right  strength, 4/5  Sensation to light touch intact throughout  Positive Head's test on the right side negative on the left side  Positive Spurling's test on the right side  DTR 3+ without clonus  Tandem gait instability on heel-to-toe walking  X-ray of cervical spine demonstrate stable hard wears without loosening of the screw nor fracture of cervical spine   Hx, PE, and x-ray image reviewed  Management plan discussed with the patient  MRI of cervical spine without contrast ordered  Ambulatory referral to pain management for possible NATACHA also placed  Note to her PCP for Lyrica script  Fall precaution  Avoid axial loading, strenuous activities or movement of your neck  Questions and concerns were answered  Patient expressed their understanding is and agreed with the plan  Plan:  1  MRI of  cervical spine without contrast  2  Ambulatory referral to pain management for cervical spine NATACHA  3  Note to her PCP for Lyrica script  4   Follow-up in 2 months with me and Dr Alysa Urban, SNPx  5  Call 4 question or concern  Subjective/Objective     Chief Complaint: " My neck and right arm is bothering"        HPI  Patient is a 52years old pleasant obese lady status post C5-6 ACDF on 04/30/2019 by Dr Alysa Urban, started complaining of posterior lower neck pain that radiates down to her right upper extremity to the pinky and ring finger since August 2020  Patient noticed the pain started in her neck following jolting movement of the neck while driving  Denies any bumping or injury to the neck or head  Pain is stabbing  She reports associated tingling sensation in her right pinky and ring fingers  Reports weakness in the right  strength with difficulty holding objects  She also reports occasional staggering gait, but denies any tendency to fall  Patient denies history of fever, chills, rigors, cough or chest pain  Patient back to smoking cigarettes, but denies drinking alcohol  Cervical spine x-rays and CTA of the head and neck demonstrates stable C5-6 hardwares, and anatomical cervical spines  ROS  Review of system personally reviewed and updated  Review of Systems   Constitutional: Negative  HENT: Negative  Eyes: Negative  Respiratory: Negative  Cardiovascular: Negative  Gastrointestinal: Negative  Endocrine: Negative  Genitourinary: Negative  Musculoskeletal: Positive for neck pain (neck radiates into bilateral shoulders and between shoulder blades)  Skin: Negative  Allergic/Immunologic: Negative  Neurological: Positive for numbness (left arm and mid back, right arm tingling)  Negative for dizziness, seizures, weakness and headaches  Hematological: Negative  Psychiatric/Behavioral: Negative          Family History    Family History   Adopted: Yes       Social History    Social History     Socioeconomic History    Marital status: /Civil Union     Spouse name: Not on file    Number of children: Not on file    Years of education: Not on file    Highest education level: Not on file   Occupational History    Not on file   Social Needs    Financial resource strain: Not on file    Food insecurity     Worry: Not on file     Inability: Not on file    Transportation needs     Medical: Not on file     Non-medical: Not on file   Tobacco Use    Smoking status: Current Every Day Smoker     Packs/day: 1 00     Types: Cigarettes    Smokeless tobacco: Never Used   Substance and Sexual Activity    Alcohol use: Never     Frequency: Never    Drug use: Never    Sexual activity: Not on file   Lifestyle    Physical activity     Days per week: Not on file     Minutes per session: Not on file    Stress: Not on file   Relationships    Social connections     Talks on phone: Not on file     Gets together: Not on file     Attends [employer] service: Not on file     Active member of club or organization: Not on file     Attends meetings of clubs or organizations: Not on file     Relationship status: Not on file    Intimate partner violence     Fear of current or ex partner: Not on file     Emotionally abused: Not on file     Physically abused: Not on file     Forced sexual activity: Not on file   Other Topics Concern    Not on file   Social History Narrative    Not on file       Past Medical History    Past Medical History:   Diagnosis Date    Bulging of cervical intervertebral disc     Celiac disease     Crohn disease ([facility] [address] )     Disease of thyroid gland     [facility]     Migraines     Neck pain, chronic     Polycystic ovarian syndrome     PONV (postoperative nausea and vomiting)     TMJ (temporomandibular joint syndrome)        Surgical History    Past Surgical History:   Procedure Laterality Date    ADENOIDECTOMY      ANKLE SURGERY Left     reconstruction     SECTION      x2    DIAGNOSTIC LAPAROSCOPY      HYSTERECTOMY      UT ARTHRODESIS ANT INTERBODY INC DISCECTOMY, CERVICAL BELOW C2 Bilateral 4/30/2019    Procedure: C5/6 ANTERIOR CERVICAL DECOMPRESSION AND FUSION;  Surgeon: Leticia Vallejo MD;  Location: [facility];  Service: Neurosurgery    TONSILLECTOMY      TUBAL LIGATION         Medications      Current Outpatient Medications:     apixaban (Eliquis DVT/PE Starter Pack) 5 mg, Take 2 tablets (10 mg total) by mouth 2 (two) times a day for 7 days, THEN 1 tablet (5 mg total) 2 (two) times a day for 23 days  , Disp: 74 tablet, Rfl: 0    cholecalciferol (VITAMIN D3) 1,000 units tablet, Take 2,000 Units by mouth daily, Disp: , Rfl:     Cyanocobalamin (VITAMIN B 12 PO), Take 1 tablet by mouth daily , Disp: , Rfl:     ferrous sulfate (IRON SUPPLEMENT) 325 (65 Fe) mg tablet, Take 325 mg by mouth daily with breakfast, Disp: , Rfl:     HYDROcodone-acetaminophen (NORCO) 5-325 mg per tablet, Take 1 tablet by mouth every 6 (six) hours as needed for pain for up to 10 dosesMax Daily Amount: 4 tablets, Disp: 10 tablet, Rfl: 0    levothyroxine 100 mcg tablet, Take 100 mcg by mouth daily, Disp: , Rfl:     lidocaine (LIDODERM) 5 %, Apply 1 patch topically daily Remove & Discard patch within 12 hours or as directed by MD, Disp: 30 patch, Rfl: 0    mesalamine (PENTASA) 500 mg CR capsule, Take 1,000 mg by mouth 4 (four) times a day, Disp: , Rfl:     methylPREDNISolone 4 MG tablet therapy pack, Use as directed on package, Disp: 21 tablet, Rfl: 0    pantoprazole (PROTONIX) 40 mg tablet, Take 40 mg by mouth daily , Disp: , Rfl:     pramipexole (MIRAPEX) 0 5 mg tablet, Take 0 5 mg by mouth 2 (two) times a day , Disp: , Rfl:     topiramate (TOPAMAX) 50 MG tablet, Take 50 mg by mouth daily at bedtime , Disp: , Rfl:     Allergies    Allergies   Allergen Reactions    Amoxicillin Rash    Codeine Rash and Other (See Comments)     Rash    Diclofenac Rash    Penicillins Other (See Comments)     Rash    Sulfa Antibiotics Other (See Comments)     Rash    Diclofenac Sodium Rash    Etodolac Hives, Rash and Other (See Comments)     Rash         The following portions of the patient's history were reviewed and updated as appropriate: allergies, current medications, past family history, past medical history, past social history, past surgical history and problem list     Investigations    I personally reviewed the XRAY results with the patient:    X-ray of cervical spine appears stable hardware and expected postoperative change    Physical Exam    There were no vitals filed for this visit  Physical Exam  Constitutional:       Appearance: She is obese  HENT:      Head: Normocephalic  Eyes:      Extraocular Movements: Extraocular movements intact and EOM normal    Neck:      Musculoskeletal: Normal range of motion  Cardiovascular:      Rate and Rhythm: Normal rate  Pulses: Normal pulses  Pulmonary:      Effort: Pulmonary effort is normal    Musculoskeletal: Normal range of motion  General: Tenderness present  Comments: Tenderness on shoulder blade region on palpation, and also tenderness elicited during Spurling's test on the right side   Skin:     General: Skin is warm  Neurological:      Mental Status: She is alert and oriented to person, place, and time  GCS: GCS eye subscore is 4  GCS verbal subscore is 5  GCS motor subscore is 6  Cranial Nerves: Cranial nerves are intact  Sensory: Sensation is intact  Motor: Weakness present  Coordination: Finger-Nose-Finger Test normal       Deep Tendon Reflexes:      Reflex Scores:       Tricep reflexes are 3+ on the right side and 3+ on the left side  Bicep reflexes are 3+ on the right side and 3+ on the left side  Brachioradialis reflexes are 3+ on the right side and 3+ on the left side  Patellar reflexes are 3+ on the right side and 3+ on the left side  Achilles reflexes are 3+ on the right side and 3+ on the left side  Comments: Right  strength weakness noted 4/5     Psychiatric:         Speech: Speech normal        Neurologic Exam     Mental Status   Oriented to person, place, and time     Speech: speech is normal   Level of consciousness: alert    Cranial Nerves     CN III, IV, VI   Extraocular motions are normal    Nystagmus: none     CN XI   CN XI normal      Motor Exam   Muscle bulk: normal  Overall muscle tone: normal  Right arm tone: normal  Left arm tone: normal  Right arm pronator drift: absent  Left arm pronator drift: absent  Right leg tone: normal  Left leg tone: normal    Sensory Exam   Light touch normal      Gait, Coordination, and Reflexes     Coordination   Finger to nose coordination: normal    Reflexes   Right brachioradialis: 3+  Left brachioradialis: 3+  Right biceps: 3+  Left biceps: 3+  Right triceps: 3+  Left triceps: 3+  Right patellar: 3+  Left patellar: 3+  Right achilles: 3+  Left achilles: 3+  Right Head: present  Left Head: absent  Right ankle clonus: absent  Left ankle clonus: absent

## 2020-09-21 ENCOUNTER — HOSPITAL ENCOUNTER (OUTPATIENT)
Dept: MRI IMAGING | Facility: HOSPITAL | Age: 50
Discharge: HOME | End: 2020-09-21
Payer: COMMERCIAL

## 2020-09-21 DIAGNOSIS — M54.12 RADICULOPATHY, CERVICAL: ICD-10-CM

## 2020-09-21 PROCEDURE — 72141 MRI NECK SPINE W/O DYE: CPT

## 2020-09-21 PROCEDURE — G1004 CDSM NDSC: HCPCS

## 2020-09-24 PROBLEM — I82.452 ACUTE DEEP VEIN THROMBOSIS (DVT) OF LEFT PERONEAL VEIN (HCC): Status: ACTIVE | Noted: 2020-09-24

## 2020-09-24 NOTE — ASSESSMENT & PLAN NOTE
61-year-old female smoker with nicotine dependence, symptomatic BLE varicose veins with truncal varicosities, hx L calf DVT '11 and recent acute nonocclusive L peroneal DVT  -continue anticoagulation therapy with Eliquis  Recommend lifelong anticoagulation therapy given recurrent DVT  -recommend ambulatory referral to Hematology for hypercoagulable workup and recommendations of duration of anticoagulation therapy  Patient reports that PCP is managing referral to Hematology   -compression and elevation of lower extremity for symptomatic edema relief  -discussed the pathophysiology of venous thromboembolism and and direct causal association with smoking  Encourage smoking cessation   -return to office in 3 months with [facility] for workup of symptomatic varicose veins    See plan below   -instructed to contact the office with new symptoms or concerns

## 2020-09-24 NOTE — PATIENT INSTRUCTIONS
Deep Venous Thrombosis   WHAT YOU NEED TO KNOW:   What is deep venous thrombosis? Deep venous thrombosis (DVT) is a blood clot that forms in a deep vein of the body  The deep veins in the legs, thighs, and hips are the most common sites for DVT  DVT can also occur in a deep vein within your arms  The clot prevents the normal flow of blood in the vein  The blood backs up and causes pain and swelling  The DVT can break into smaller pieces and travel to your lungs and cause a blockage called a pulmonary embolism  A pulmonary embolism can become life-threatening  What increases my risk for DVT? You may be at higher risk if you have had DVT before or you have a family history of blood clots  The following conditions also increase your risk:  · Age older than 60 years    · Obesity    · Injury to a deep vein, or surgery    · Use of hormone replacement therapy or birth control medicine such as pills or patches    · Pregnancy, and up to 6 weeks after childbirth     · A blood disorder that makes your blood clot faster than normal, such as factor V Leiden mutation    · Cancer or heart failure     · Limited mobility caused by bed rest, a leg cast, or sitting for long periods    · Varicose veins    · Catheter placed in a large vein  What are the signs and symptoms of DVT? · Swelling    · Redness    · Warmth, pain, or tenderness  How is DVT diagnosed? · A D-dimer blood test  may be done to check for signs of a blood clot  · An ultrasound  uses sound waves to show pictures on a monitor  An ultrasound may be done to show a clot in your vein  · Contrast venography  is an x-ray of a vein  Contrast liquid is used to make the vein easier to see on the x-ray  Tell a healthcare provider if you have ever had an allergic reaction to contrast liquid  How is DVT treated? · Blood thinners  help prevent the DVT from getting bigger and prevent new clots from forming   Examples of blood thinners include heparin, rivaroxaban, apixiban, and warfarin  The following are general safety guidelines to follow while you are taking a blood thinner:     ¨ Watch for bleeding and bruising  Watch for bleeding from your gums or nose  Watch for blood in your urine and bowel movements  Use a soft washcloth on your skin, and a soft toothbrush to brush your teeth  This can keep your skin and gums from bleeding  If you shave, use an electric shaver  Do not play contact sports  ¨ Tell your dentist and other healthcare providers that you take a blood thinner  Wear a bracelet or necklace that says you take this medicine  ¨ Do not start or stop any medicines unless your healthcare provider tells you to  Many medicines cannot be used with blood thinners  ¨ Tell your healthcare provider right away if you forget to take the blood thinner , or if you take too much  ¨ Warfarin  is a blood thinner that you may need to take  The following are additional things you should be aware of if you take warfarin:    § Foods and medicines can affect the amount of warfarin in your blood  Do not make major changes to your diet  Warfarin works best when you eat about the same amount of vitamin K every day  Vitamin K is found in green leafy vegetables and certain other foods  Ask for more information about what to eat or not to eat  § You will need to see your healthcare provider for follow-up visits  You will need regular blood tests to decide how much warfarin you need  · Clot busters  are emergency medicines that work to dissolve blood clots  They cannot be used during pregnancy or in people with medical conditions that increase their risk of bleeding  · A vena cava filter  may be placed inside your vena cava to treat your DVT  The vena cava is a large vein that brings blood from your lower body up to your heart  The filter traps blood clots and prevents them from going into your lungs       · Surgery , called a thrombectomy, may be done to remove the clot  A procedure called thrombolysis may instead be done to inject a clot buster that helps break the clot apart  How can I manage my DVT? · Wear pressure stockings  The stockings are tight and put pressure on your legs  This improves blood flow and helps prevent clots  Wear the stockings during the day  Do not wear them when you sleep  · Elevate your legs  above the level of your heart as often as you can  This will help decrease swelling and pain  Prop your legs on pillows or blankets to keep them elevated comfortably  · Exercise regularly  to help increase your blood flow  Walking is a good low-impact exercise  Talk to your healthcare provider about the best exercise plan for you  · Change body positions often  If you travel by car or work at a desk, move and stretch in your seat several times each hour  In an airplane, get up and walk every hour  If you are bedridden, ask for help to change your position every 1 to 2 hours  · Maintain a healthy weight  Ask your healthcare provider how much you should weigh  Ask him to help you create a weight loss plan if you are overweight  · Do not smoke  Nicotine and other chemicals in cigarettes and cigars can damage blood vessels and make it more difficult to manage your DVT  Ask your healthcare provider for information if you currently smoke and need help to quit  E-cigarettes or smokeless tobacco still contain nicotine  Talk to your healthcare provider before you use these products  Call 911 for the following:   · You feel lightheaded, short of breath, and have chest pain  · You cough up blood  When should I seek immediate care? · You develop new DVT symptoms in another leg or arm  When should I contact my healthcare provider? · Your gums or nose bleed  · You see blood in your urine or bowel movements      · Your bowel movements are black or darker than normal     · You have questions or concerns about your conditions or care  CARE AGREEMENT:   You have the right to help plan your care  Learn about your health condition and how it may be treated  Discuss treatment options with your caregivers to decide what care you want to receive  You always have the right to refuse treatment  The above information is an  only  It is not intended as medical advice for individual conditions or treatments  Talk to your doctor, nurse or pharmacist before following any medical regimen to see if it is safe and effective for you  © 2017 [facility] Information is for End User's use only and may not be sold, redistributed or otherwise used for commercial purposes  All illustrations and images included in CareNotes® are the copyrighted property of [employer]  or [employer]  Deep Venous Thrombosis   WHAT YOU NEED TO KNOW:   What is deep venous thrombosis? Deep venous thrombosis (DVT) is a blood clot that forms in a deep vein of the body  The deep veins in the legs, thighs, and hips are the most common sites for DVT  DVT can also occur in a deep vein within your arms  The clot prevents the normal flow of blood in the vein  The blood backs up and causes pain and swelling  The DVT can break into smaller pieces and travel to your lungs and cause a blockage called a pulmonary embolism  A pulmonary embolism can become life-threatening  What increases my risk for DVT? You may be at higher risk if you have had DVT before or you have a family history of blood clots   The following conditions also increase your risk:  · Age older than 60 years    · Obesity    · Injury to a deep vein, or surgery    · Use of hormone replacement therapy or birth control medicine such as pills or patches    · Pregnancy, and up to 6 weeks after childbirth     · A blood disorder that makes your blood clot faster than normal, such as factor V Leiden mutation    · Cancer or heart failure     · Limited mobility caused by bed rest, a leg cast, or sitting for long periods    · Varicose veins    · Catheter placed in a large vein  What are the signs and symptoms of DVT? · Swelling    · Redness    · Warmth, pain, or tenderness  How is DVT diagnosed? · A D-dimer blood test  may be done to check for signs of a blood clot  · An ultrasound  uses sound waves to show pictures on a monitor  An ultrasound may be done to show a clot in your vein  · Contrast venography  is an x-ray of a vein  Contrast liquid is used to make the vein easier to see on the x-ray  Tell a healthcare provider if you have ever had an allergic reaction to contrast liquid  How is DVT treated? · Blood thinners  help prevent the DVT from getting bigger and prevent new clots from forming  Examples of blood thinners include heparin, rivaroxaban, apixiban, and warfarin  The following are general safety guidelines to follow while you are taking a blood thinner:     ¨ Watch for bleeding and bruising  Watch for bleeding from your gums or nose  Watch for blood in your urine and bowel movements  Use a soft washcloth on your skin, and a soft toothbrush to brush your teeth  This can keep your skin and gums from bleeding  If you shave, use an electric shaver  Do not play contact sports  ¨ Tell your dentist and other healthcare providers that you take a blood thinner  Wear a bracelet or necklace that says you take this medicine  ¨ Do not start or stop any medicines unless your healthcare provider tells you to  Many medicines cannot be used with blood thinners  ¨ Tell your healthcare provider right away if you forget to take the blood thinner , or if you take too much  ¨ Warfarin  is a blood thinner that you may need to take  The following are additional things you should be aware of if you take warfarin:    § Foods and medicines can affect the amount of warfarin in your blood  Do not make major changes to your diet   Warfarin works best when you eat about the same amount of vitamin K every day  Vitamin K is found in green leafy vegetables and certain other foods  Ask for more information about what to eat or not to eat  § You will need to see your healthcare provider for follow-up visits  You will need regular blood tests to decide how much warfarin you need  · Clot busters  are emergency medicines that work to dissolve blood clots  They cannot be used during pregnancy or in people with medical conditions that increase their risk of bleeding  · A vena cava filter  may be placed inside your vena cava to treat your DVT  The vena cava is a large vein that brings blood from your lower body up to your heart  The filter traps blood clots and prevents them from going into your lungs  · Surgery , called a thrombectomy, may be done to remove the clot  A procedure called thrombolysis may instead be done to inject a clot buster that helps break the clot apart  How can I manage my DVT? · Wear pressure stockings  The stockings are tight and put pressure on your legs  This improves blood flow and helps prevent clots  Wear the stockings during the day  Do not wear them when you sleep  · Elevate your legs  above the level of your heart as often as you can  This will help decrease swelling and pain  Prop your legs on pillows or blankets to keep them elevated comfortably  · Exercise regularly  to help increase your blood flow  Walking is a good low-impact exercise  Talk to your healthcare provider about the best exercise plan for you  · Change body positions often  If you travel by car or work at a desk, move and stretch in your seat several times each hour  In an airplane, get up and walk every hour  If you are bedridden, ask for help to change your position every 1 to 2 hours  · Maintain a healthy weight  Ask your healthcare provider how much you should weigh  Ask him to help you create a weight loss plan if you are overweight       · Do not smoke   Nicotine and other chemicals in cigarettes and cigars can damage blood vessels and make it more difficult to manage your DVT  Ask your healthcare provider for information if you currently smoke and need help to quit  E-cigarettes or smokeless tobacco still contain nicotine  Talk to your healthcare provider before you use these products  Call 911 for the following:   · You feel lightheaded, short of breath, and have chest pain  · You cough up blood  When should I seek immediate care? · You develop new DVT symptoms in another leg or arm  When should I contact my healthcare provider? · Your gums or nose bleed  · You see blood in your urine or bowel movements  · Your bowel movements are black or darker than normal     · You have questions or concerns about your conditions or care  CARE AGREEMENT:   You have the right to help plan your care  Learn about your health condition and how it may be treated  Discuss treatment options with your caregivers to decide what care you want to receive  You always have the right to refuse treatment  The above information is an  only  It is not intended as medical advice for individual conditions or treatments  Talk to your doctor, nurse or pharmacist before following any medical regimen to see if it is safe and effective for you  © 2017 [employer]  Information is for End User's use only and may not be sold, redistributed or otherwise used for commercial purposes  All illustrations and images included in CareNotes® are the copyrighted property of [employer]  or [employer]  -continue anticoagulation (Eliquis)  Recommend anticoagulation therapy longterm considering recurrent DVT  refer you to Hematology at the direction of your PCP for evaluation and hypercoagulable workup determine duration of anticoagulation therapy    -recommend daily use of compression stockings and elevation of legs for symptomatic relief swelling  -it is imperative that you quit smoking  There is a direct relationship between blood clots and smoking  -we will schedule you for a lower extremity venous reflux study to assess the function of your valves and determine if there is any role for surgical intervention   -return to office in 3 months with venous reflux study for re-evaluation and discussion of surgical options for your symptomatic varicose veins  -please contact the office with new symptoms or concerns

## 2020-09-25 ENCOUNTER — CONSULT (OUTPATIENT)
Dept: VASCULAR SURGERY | Facility: CLINIC | Age: 50
End: 2020-09-25
Payer: COMMERCIAL

## 2020-09-25 VITALS
SYSTOLIC BLOOD PRESSURE: 122 MMHG | OXYGEN SATURATION: 99 % | HEART RATE: 87 BPM | BODY MASS INDEX: 40.09 KG/M2 | HEIGHT: 70 IN | RESPIRATION RATE: 22 BRPM | WEIGHT: 280 LBS | TEMPERATURE: 98.7 F | DIASTOLIC BLOOD PRESSURE: 76 MMHG

## 2020-09-25 DIAGNOSIS — I83.813 VARICOSE VEINS OF BOTH LOWER EXTREMITIES WITH PAIN: ICD-10-CM

## 2020-09-25 DIAGNOSIS — I82.409 DVT (DEEP VENOUS THROMBOSIS) (HCC): ICD-10-CM

## 2020-09-25 DIAGNOSIS — I82.452 ACUTE DEEP VEIN THROMBOSIS (DVT) OF LEFT PERONEAL VEIN (HCC): Primary | ICD-10-CM

## 2020-09-25 PROCEDURE — 99244 OFF/OP CNSLTJ NEW/EST MOD 40: CPT | Performed by: PHYSICIAN ASSISTANT

## 2020-09-25 NOTE — PROGRESS NOTES
Assessment/Plan:    Acute deep vein thrombosis (DVT) of left peroneal vein [facility])  57-year-old female smoker with nicotine dependence, symptomatic BLE varicose veins with truncal varicosities, hx L calf DVT '11 and recent acute nonocclusive L peroneal DVT  -continue anticoagulation therapy with Eliquis  Recommend lifelong anticoagulation therapy given recurrent DVT  -recommend ambulatory referral to Hematology for hypercoagulable workup and recommendations of duration of anticoagulation therapy  Patient reports that PCP is managing referral to Hematology   -compression and elevation of lower extremity for symptomatic edema relief  -discussed the pathophysiology of venous thromboembolism and and direct causal association with smoking  Encourage smoking cessation   -return to office in 3 months with LEVDR for workup of symptomatic varicose veins  See plan below   -instructed to contact the office with new symptoms or concerns    Varicose veins of both lower extremities with pain  Symptomatic BLE varicose veins  L>R, x 4 months with truncal varicosities    Patient does not wear compression  -recommend 3 month trial of conservative measures to include daily use of compression stockings, lower extremity elevation, low-sodium diet, aerobic activity, weight management and skin moisturization  -LEVDR in 3 months  -return to office with surgeon in 3 months with LEVDR for re-evaluation and discussion of surgical options  -instructed to contact the office in the interim with any questions, concerns or new symptoms       Diagnoses and all orders for this visit:    Acute deep vein thrombosis (DVT) of left peroneal vein (HCC)  -     Compression Stocking    DVT (deep venous thrombosis) ([facility] [address] )  -     Ambulatory referral to Vascular Surgery    Varicose veins of both lower extremities with pain  -     Compression Stocking  -     Ambulatory referral to Vascular Surgery  -     VAS reflux lower limb venous duplex study with reflux assessment, complete bilateral; Future    Other orders  -     Cancel: Ambulatory referral to Hematology / Oncology; Future          Subjective:      Patient ID: Narayan Munoz is a 52 y o  female  Patient is here following a DVT that was found a month ago  Patient complains of it being hot to the touch and she has redness that is present in her legs  Patient complains of burning  She is currently taking Eliquis 5mg daily  Patient currently smokes 1 1/2 PPD  24-year-old female smoker with nicotine dependence, symptomatic BLE varicose veins, L>R, with truncal varicosities, hx L calf DVT '11 and recent acute nonocclusive L peroneal DVT who is referred by her PCP for evaluation of DVT and symptomatic varicose veins  Patient with history of provoked left calf DVT 2011 after ankle surgery treated with six-month course of Coumadin  Patient now presents to the ER on 8/26/2020 with left calf pain and swelling and diagnosed with unprovoked acute nonocclusive left peroneal DVT  Patient's acute symptoms have resolved  However, the patient has been experience a four-month history of bilateral lower leg burning, intermittent edema, heaviness, aching which is exacerbated by sitting or standing for prolonged periods of time  The patient has worn compression intermittently in the past but presently not wearing compression  She previously worked as a , requiring sitting for prolonged periods of time  Patient denies history of PE, superficial thrombophlebitis, recurrent lower extremity cellulitis, stasis dermatitis, venous ulcerations or bleeding varicosities  She denies prior venous intervention or evaluation by vascular surgery  Patient is adopted and does not know her family history         The following portions of the patient's history were reviewed and updated as appropriate: allergies, current medications, past family history, past medical history, past social history, past surgical history and problem list     Review of Systems   Constitutional: Positive for unexpected weight change (dained 10 pounds )  HENT: Negative  Eyes: Negative  Respiratory: Negative  Cardiovascular: Positive for leg swelling  Painful veins    Gastrointestinal: Negative  Endocrine: Positive for cold intolerance, heat intolerance and polydipsia  Genitourinary: Negative  Musculoskeletal: Positive for joint swelling  Leg pain    Skin: Positive for color change (redness) and rash  Allergic/Immunologic: Positive for environmental allergies  Neurological: Negative  Hematological: Bruises/bleeds easily  Psychiatric/Behavioral: Positive for sleep disturbance  I have reviewed and made appropriate changes to the review of systems input by the medical assistant      Vitals:    20 0850   BP: 122/76   Pulse: 87   Resp: 22   Temp: 98 7 °F (37 1 °C)   TempSrc: Tympanic   SpO2: 99%   Weight: 127 kg (280 lb)   Height: 5' 10" (1 778 m)       Patient Active Problem List   Diagnosis    Cervical spinal stenosis    Radiculopathy, cervical    RLQ abdominal tenderness    Acute deep vein thrombosis (DVT) of left peroneal vein (HCC)    Varicose veins of both lower extremities with pain       Past Surgical History:   Procedure Laterality Date    ADENOIDECTOMY      ANKLE SURGERY Left     reconstruction     SECTION      x2    DIAGNOSTIC LAPAROSCOPY      HYSTERECTOMY      TX ARTHRODESIS ANT INTERBODY INC DISCECTOMY, CERVICAL BELOW C2 Bilateral 2019    Procedure: C5/6 ANTERIOR CERVICAL DECOMPRESSION AND FUSION;  Surgeon: Libia Macdonald MD;  [facility];  Service: Neurosurgery    TONSILLECTOMY      TUBAL LIGATION         Family History   Adopted: Yes       Social History     Socioeconomic History    Marital status: /Civil Union     Spouse name: Not on file    Number of children: Not on file    Years of education: Not on file    Highest education level: Not on file Occupational History    Not on file   Social Needs    Financial resource strain: Not on file    Food insecurity     Worry: Not on file     Inability: Not on file    Transportation needs     Medical: Not on file     Non-medical: Not on file   Tobacco Use    Smoking status: Current Every Day Smoker     Packs/day: 1 00     Types: Cigarettes    Smokeless tobacco: Never Used   Substance and Sexual Activity    Alcohol use: Never     Frequency: Never    Drug use: Never    Sexual activity: Not on file   Lifestyle    Physical activity     Days per week: Not on file     Minutes per session: Not on file    Stress: Not on file   Relationships    Social connections     Talks on phone: Not on file     Gets together: Not on file     Attends [employer] service: Not on file     Active member of club or organization: Not on file     Attends meetings of clubs or organizations: Not on file     Relationship status: Not on file    Intimate partner violence     Fear of current or ex partner: Not on file     Emotionally abused: Not on file     Physically abused: Not on file     Forced sexual activity: Not on file   Other Topics Concern    Not on file   Social History Narrative    Not on file       Allergies   Allergen Reactions    Amoxicillin Rash    Codeine Rash and Other (See Comments)     Rash    Diclofenac Rash    Penicillins Other (See Comments)     Rash    Sulfa Antibiotics Other (See Comments) and Hives     Rash  Other reaction(s):  Other (See Comments)  Rash    Diclofenac Sodium Rash    Etodolac Hives, Rash and Other (See Comments)     Rash           Current Outpatient Medications:     apixaban (Eliquis) 5 mg, Take 5 mg by mouth 2 (two) times a day, Disp: , Rfl:     cholecalciferol (VITAMIN D3) 1,000 units tablet, Take 2,000 Units by mouth daily, Disp: , Rfl:     Cyanocobalamin (VITAMIN B 12 PO), Take 1 tablet by mouth daily , Disp: , Rfl:     ferrous sulfate (IRON SUPPLEMENT) 325 (65 Fe) mg tablet, Take 325 mg by mouth daily with breakfast, Disp: , Rfl:     HYDROcodone-acetaminophen (NORCO) 5-325 mg per tablet, Take 1 tablet by mouth every 6 (six) hours as needed for pain for up to 10 dosesMax Daily Amount: 4 tablets, Disp: 10 tablet, Rfl: 0    levothyroxine 100 mcg tablet, Take 100 mcg by mouth daily, Disp: , Rfl:     lidocaine (LIDODERM) 5 %, Apply 1 patch topically daily Remove & Discard patch within 12 hours or as directed by MD, Disp: 30 patch, Rfl: 0    mesalamine (PENTASA) 500 mg CR capsule, Take 1,000 mg by mouth 4 (four) times a day, Disp: , Rfl:     pantoprazole (PROTONIX) 40 mg tablet, Take 40 mg by mouth daily , Disp: , Rfl:     pramipexole (MIRAPEX) 0 5 mg tablet, Take 0 5 mg by mouth 2 (two) times a day , Disp: , Rfl:     topiramate (TOPAMAX) 50 MG tablet, Take 50 mg by mouth daily at bedtime , Disp: , Rfl:     Objective:  Imaging study:  L EVD 8/26/2020:  Imaging study reviewed and as described above  Acute nonocclusive DVT left peroneal vein  Arterial waveforms triphasic  No chronic DVT or acute or chronic superficial thrombophlebitis    /76   Pulse 87   Temp 98 7 °F (37 1 °C) (Tympanic)   Resp 22   Ht 5' 10" (1 778 m)   Wt 127 kg (280 lb)   SpO2 99%   BMI 40 18 kg/m²     RLE    RLE    LLE    LLE         Physical Exam  Vitals signs and nursing note reviewed  Constitutional:       General: She is not in acute distress  Appearance: She is well-developed  She is obese  HENT:      Head: Normocephalic and atraumatic  Eyes:      General: No scleral icterus  Conjunctiva/sclera: Conjunctivae normal       Pupils: Pupils are equal, round, and reactive to light  Neck:      Musculoskeletal: Normal range of motion and neck supple  Thyroid: No thyromegaly  Vascular: No carotid bruit or JVD  Trachea: No tracheal deviation  Cardiovascular:      Rate and Rhythm: Normal rate and regular rhythm  Pulses: Normal pulses             Carotid pulses are 2+ on the right side and 2+ on the left side  Radial pulses are 2+ on the right side and 2+ on the left side  Dorsalis pedis pulses are 2+ on the right side and 2+ on the left side  Heart sounds: S1 normal and S2 normal  No murmur  No friction rub  No gallop  No S3 sounds  Comments: Bilateral lower extremities warm, pink, motor and sensory intact and well perfused without cyanosis, pallor, rubor, lipodermatosclerosis  Mild hemosiderin staining bilateral lower extremities  No venous ulcerations  See Clinical images above  Significant truncal varicosities on anterior thighs, lateral thighs and anterior lateral calf  Pulmonary:      Effort: No respiratory distress  Breath sounds: Normal breath sounds  No stridor  No wheezing, rhonchi or rales  Abdominal:      General: Bowel sounds are normal  There is no distension or abdominal bruit  Palpations: Abdomen is soft  There is no mass or pulsatile mass  Tenderness: There is no abdominal tenderness  There is no rebound  Musculoskeletal: Normal range of motion  General: No deformity  Right lower le+ Edema present  Left lower le+ Edema present  Skin:     General: Skin is warm and dry  Coloration: Skin is not pale  Findings: No erythema or lesion  Neurological:      General: No focal deficit present  Mental Status: She is alert and oriented to person, place, and time  Psychiatric:         Mood and Affect: Mood normal          Thought Content:  Thought content normal

## 2020-09-25 NOTE — ASSESSMENT & PLAN NOTE
Symptomatic BLE varicose veins  L>R, x 4 months with truncal varicosities    Patient does not wear compression  -recommend 3 month trial of conservative measures to include daily use of compression stockings, lower extremity elevation, low-sodium diet, aerobic activity, weight management and skin moisturization  -LEVDR in 3 months  -return to office with surgeon in 3 months with LEVDR for re-evaluation and discussion of surgical options  -instructed to contact the office in the interim with any questions, concerns or new symptoms

## 2020-09-28 ENCOUNTER — OFFICE VISIT (OUTPATIENT)
Dept: PAIN MEDICINE | Facility: CLINIC | Age: 50
End: 2020-09-28
Payer: COMMERCIAL

## 2020-09-28 ENCOUNTER — TRANSCRIBE ORDERS (OUTPATIENT)
Dept: PAIN MEDICINE | Facility: CLINIC | Age: 50
End: 2020-09-28

## 2020-09-28 VITALS
HEART RATE: 57 BPM | SYSTOLIC BLOOD PRESSURE: 120 MMHG | WEIGHT: 280 LBS | TEMPERATURE: 97.8 F | DIASTOLIC BLOOD PRESSURE: 80 MMHG | BODY MASS INDEX: 40.18 KG/M2

## 2020-09-28 DIAGNOSIS — M54.2 NECK PAIN: ICD-10-CM

## 2020-09-28 DIAGNOSIS — G89.4 CHRONIC PAIN SYNDROME: ICD-10-CM

## 2020-09-28 DIAGNOSIS — M48.02 CERVICAL SPINAL STENOSIS: ICD-10-CM

## 2020-09-28 DIAGNOSIS — M96.1 POSTLAMINECTOMY SYNDROME, CERVICAL REGION: ICD-10-CM

## 2020-09-28 DIAGNOSIS — M79.18 MYOFASCIAL PAIN SYNDROME: ICD-10-CM

## 2020-09-28 PROCEDURE — 99214 OFFICE O/P EST MOD 30 MIN: CPT | Performed by: NURSE PRACTITIONER

## 2020-09-28 RX ORDER — TIZANIDINE 4 MG/1
4 TABLET ORAL 2 TIMES DAILY
Qty: 60 TABLET | Refills: 0 | Status: SHIPPED | OUTPATIENT
Start: 2020-09-28 | End: 2021-04-28 | Stop reason: ALTCHOICE
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
## 2020-09-29 ENCOUNTER — HOSPITAL ENCOUNTER (OUTPATIENT)
Dept: SURGERY | Facility: HOSPITAL | Age: 50
Discharge: HOME | End: 2020-09-29
Payer: COMMERCIAL

## 2020-09-29 VITALS
RESPIRATION RATE: 18 BRPM | TEMPERATURE: 97 F | OXYGEN SATURATION: 99 % | HEIGHT: 70 IN | SYSTOLIC BLOOD PRESSURE: 126 MMHG | BODY MASS INDEX: 40.09 KG/M2 | HEART RATE: 68 BPM | WEIGHT: 280 LBS | DIASTOLIC BLOOD PRESSURE: 64 MMHG

## 2020-09-29 PROCEDURE — 96413 CHEMO IV INFUSION 1 HR: CPT

## 2020-09-29 RX ORDER — ACETAMINOPHEN 325 MG/1
650 TABLET ORAL ONCE
Status: COMPLETED | OUTPATIENT
Start: 2020-09-29 | End: 2020-09-29

## 2020-09-29 RX ORDER — TOPIRAMATE 25 MG/1
25 CAPSULE, COATED PELLETS ORAL
COMMUNITY
End: 2021-09-10 | Stop reason: SDUPTHER

## 2020-09-29 RX ORDER — DIPHENHYDRAMINE HCL 25 MG
25 TABLET ORAL DAILY
Status: DISCONTINUED | OUTPATIENT
Start: 2020-09-29 | End: 2020-10-03 | Stop reason: HOSPADM

## 2020-09-29 RX ORDER — DIPHENHYDRAMINE HCL 25 MG
25 TABLET ORAL ONCE
Status: COMPLETED | OUTPATIENT
Start: 2020-09-29 | End: 2020-09-29

## 2020-09-29 RX ADMIN — DIPHENHYDRAMINE HCL 25 MG: 25 TABLET ORAL at 10:05

## 2020-09-29 RX ADMIN — DIPHENHYDRAMINE HCL 25 MG: 25 TABLET ORAL at 08:18

## 2020-09-29 RX ADMIN — USTEKINUMAB 520 MG: 130 SOLUTION INTRAVENOUS at 08:34

## 2020-09-29 RX ADMIN — ACETAMINOPHEN 650 MG: 325 TABLET ORAL at 08:18

## 2020-10-21 ENCOUNTER — APPOINTMENT (EMERGENCY)
Dept: RADIOLOGY | Facility: HOSPITAL | Age: 50
End: 2020-10-21
Payer: COMMERCIAL

## 2020-10-21 ENCOUNTER — HOSPITAL ENCOUNTER (EMERGENCY)
Facility: HOSPITAL | Age: 50
Discharge: HOME | End: 2020-10-21
Attending: FAMILY MEDICINE | Admitting: FAMILY MEDICINE
Payer: COMMERCIAL

## 2020-10-21 VITALS
HEART RATE: 70 BPM | DIASTOLIC BLOOD PRESSURE: 68 MMHG | TEMPERATURE: 98.2 F | SYSTOLIC BLOOD PRESSURE: 123 MMHG | RESPIRATION RATE: 18 BRPM | OXYGEN SATURATION: 100 %

## 2020-10-21 DIAGNOSIS — M25.562 LEFT KNEE PAIN: Primary | ICD-10-CM

## 2020-10-21 PROCEDURE — 96372 THER/PROPH/DIAG INJ SC/IM: CPT

## 2020-10-21 PROCEDURE — 99284 EMERGENCY DEPT VISIT MOD MDM: CPT | Performed by: PHYSICIAN ASSISTANT

## 2020-10-21 PROCEDURE — 73562 X-RAY EXAM OF KNEE 3: CPT

## 2020-10-21 PROCEDURE — 99283 EMERGENCY DEPT VISIT LOW MDM: CPT

## 2020-10-21 RX ORDER — DEXAMETHASONE SODIUM PHOSPHATE 4 MG/ML
8 INJECTION, SOLUTION INTRA-ARTICULAR; INTRALESIONAL; INTRAMUSCULAR; INTRAVENOUS; SOFT TISSUE ONCE
Status: COMPLETED | OUTPATIENT
Start: 2020-10-21 | End: 2020-10-21

## 2020-10-21 RX ADMIN — DEXAMETHASONE SODIUM PHOSPHATE 8 MG: 4 INJECTION, SOLUTION INTRA-ARTICULAR; INTRALESIONAL; INTRAMUSCULAR; INTRAVENOUS; SOFT TISSUE at 11:08

## 2020-10-23 ENCOUNTER — OFFICE VISIT (OUTPATIENT)
Dept: OBGYN CLINIC | Facility: CLINIC | Age: 50
End: 2020-10-23
Payer: COMMERCIAL

## 2020-10-23 VITALS
DIASTOLIC BLOOD PRESSURE: 68 MMHG | SYSTOLIC BLOOD PRESSURE: 122 MMHG | WEIGHT: 280.2 LBS | BODY MASS INDEX: 40.11 KG/M2 | HEIGHT: 70 IN | TEMPERATURE: 97.4 F

## 2020-10-23 DIAGNOSIS — M25.562 LEFT KNEE PAIN: ICD-10-CM

## 2020-10-23 DIAGNOSIS — Z87.828 HISTORY OF MENISCAL TEAR: Primary | ICD-10-CM

## 2020-10-23 PROCEDURE — 99203 OFFICE O/P NEW LOW 30 MIN: CPT | Performed by: ORTHOPAEDIC SURGERY

## 2020-11-03 ENCOUNTER — HOSPITAL ENCOUNTER (OUTPATIENT)
Dept: MRI IMAGING | Facility: HOSPITAL | Age: 50
Discharge: HOME | End: 2020-11-03
Attending: PHYSICIAN ASSISTANT
Payer: COMMERCIAL

## 2020-11-03 DIAGNOSIS — Z87.828 HISTORY OF MENISCAL TEAR: ICD-10-CM

## 2020-11-03 PROCEDURE — 73721 MRI JNT OF LWR EXTRE W/O DYE: CPT

## 2020-11-03 PROCEDURE — G1004 CDSM NDSC: HCPCS

## 2020-11-04 ENCOUNTER — TELEPHONE (OUTPATIENT)
Dept: OBGYN CLINIC | Facility: HOSPITAL | Age: 50
End: 2020-11-04

## 2020-11-06 ENCOUNTER — APPOINTMENT (OUTPATIENT)
Dept: LAB | Facility: HOSPITAL | Age: 50
End: 2020-11-06
Attending: PHYSICIAN ASSISTANT
Payer: COMMERCIAL

## 2020-11-06 ENCOUNTER — HOSPITAL ENCOUNTER (OUTPATIENT)
Dept: NON INVASIVE DIAGNOSTICS | Facility: HOSPITAL | Age: 50
Discharge: HOME | End: 2020-11-06
Attending: PHYSICIAN ASSISTANT
Payer: COMMERCIAL

## 2020-11-06 ENCOUNTER — HOSPITAL ENCOUNTER (OUTPATIENT)
Dept: RADIOLOGY | Facility: HOSPITAL | Age: 50
Discharge: HOME | End: 2020-11-06
Attending: PHYSICIAN ASSISTANT
Payer: COMMERCIAL

## 2020-11-06 ENCOUNTER — OFFICE VISIT (OUTPATIENT)
Dept: OBGYN CLINIC | Facility: CLINIC | Age: 50
End: 2020-11-06
Payer: COMMERCIAL

## 2020-11-06 VITALS
BODY MASS INDEX: 40.09 KG/M2 | DIASTOLIC BLOOD PRESSURE: 75 MMHG | WEIGHT: 280 LBS | HEIGHT: 70 IN | SYSTOLIC BLOOD PRESSURE: 109 MMHG | HEART RATE: 62 BPM | TEMPERATURE: 97.2 F

## 2020-11-06 DIAGNOSIS — Z01.812 PRE-OPERATIVE LABORATORY EXAMINATION: ICD-10-CM

## 2020-11-06 DIAGNOSIS — Z86.718 HISTORY OF DVT OF LOWER EXTREMITY: ICD-10-CM

## 2020-11-06 DIAGNOSIS — S83.282A TEAR OF LATERAL MENISCUS OF LEFT KNEE, CURRENT, UNSPECIFIED TEAR TYPE, INITIAL ENCOUNTER: Primary | ICD-10-CM

## 2020-11-06 LAB
ALBUMIN SERPL BCP-MCNC: 4.3 G/DL (ref 3.5–5.7)
ALP SERPL-CCNC: 45 U/L (ref 40–150)
ALT SERPL W P-5'-P-CCNC: 9 U/L (ref 7–52)
ANION GAP SERPL CALCULATED.3IONS-SCNC: 4 MMOL/L (ref 4–13)
AST SERPL W P-5'-P-CCNC: 9 U/L (ref 13–39)
ATRIAL RATE: 60 BPM
BASOPHILS # BLD AUTO: 0 THOUSANDS/ΜL (ref 0–0.1)
BASOPHILS NFR BLD AUTO: 0 % (ref 0–2)
BILIRUB SERPL-MCNC: 0.5 MG/DL (ref 0.2–1)
BUN SERPL-MCNC: 13 MG/DL (ref 7–25)
CALCIUM SERPL-MCNC: 9.3 MG/DL (ref 8.6–10.5)
CHLORIDE SERPL-SCNC: 108 MMOL/L (ref 98–107)
CO2 SERPL-SCNC: 25 MMOL/L (ref 21–31)
CREAT SERPL-MCNC: 0.96 MG/DL (ref 0.6–1.2)
EOSINOPHIL # BLD AUTO: 0.3 THOUSAND/ΜL (ref 0–0.61)
EOSINOPHIL NFR BLD AUTO: 3 % (ref 0–5)
ERYTHROCYTE [DISTWIDTH] IN BLOOD BY AUTOMATED COUNT: 13.7 % (ref 11.5–14.5)
GFR SERPL CREATININE-BSD FRML MDRD: 70 ML/MIN/1.73SQ M
GLUCOSE SERPL-MCNC: 86 MG/DL (ref 65–99)
HCT VFR BLD AUTO: 38.5 % (ref 42–47)
HGB BLD-MCNC: 13 G/DL (ref 12–16)
LYMPHOCYTES # BLD AUTO: 1.7 THOUSANDS/ΜL (ref 0.6–4.47)
LYMPHOCYTES NFR BLD AUTO: 20 % (ref 21–51)
MCH RBC QN AUTO: 30.1 PG (ref 26–34)
MCHC RBC AUTO-ENTMCNC: 33.6 G/DL (ref 31–37)
MCV RBC AUTO: 90 FL (ref 81–99)
MONOCYTES # BLD AUTO: 0.4 THOUSAND/ΜL (ref 0.17–1.22)
MONOCYTES NFR BLD AUTO: 5 % (ref 2–12)
NEUTROPHILS # BLD AUTO: 5.9 THOUSANDS/ΜL (ref 1.4–6.5)
NEUTS SEG NFR BLD AUTO: 71 % (ref 42–75)
P AXIS: 32 DEGREES
PLATELET # BLD AUTO: 246 THOUSANDS/UL (ref 149–390)
PMV BLD AUTO: 7.2 FL (ref 8.6–11.7)
POTASSIUM SERPL-SCNC: 4.3 MMOL/L (ref 3.5–5.5)
PR INTERVAL: 176 MS
PROT SERPL-MCNC: 6.5 G/DL (ref 6.4–8.9)
QRS AXIS: 3 DEGREES
QRSD INTERVAL: 96 MS
QT INTERVAL: 438 MS
QTC INTERVAL: 438 MS
RBC # BLD AUTO: 4.3 MILLION/UL (ref 3.9–5.2)
SODIUM SERPL-SCNC: 137 MMOL/L (ref 134–143)
T WAVE AXIS: 39 DEGREES
VENTRICULAR RATE: 60 BPM
WBC # BLD AUTO: 8.4 THOUSAND/UL (ref 4.8–10.8)

## 2020-11-06 PROCEDURE — 85025 COMPLETE CBC W/AUTO DIFF WBC: CPT

## 2020-11-06 PROCEDURE — 93005 ELECTROCARDIOGRAM TRACING: CPT

## 2020-11-06 PROCEDURE — 93971 EXTREMITY STUDY: CPT | Performed by: SURGERY

## 2020-11-06 PROCEDURE — 71046 X-RAY EXAM CHEST 2 VIEWS: CPT

## 2020-11-06 PROCEDURE — 80053 COMPREHEN METABOLIC PANEL: CPT

## 2020-11-06 PROCEDURE — 93010 ELECTROCARDIOGRAM REPORT: CPT | Performed by: INTERNAL MEDICINE

## 2020-11-06 PROCEDURE — 99213 OFFICE O/P EST LOW 20 MIN: CPT | Performed by: ORTHOPAEDIC SURGERY

## 2020-11-06 PROCEDURE — 36415 COLL VENOUS BLD VENIPUNCTURE: CPT

## 2020-11-06 PROCEDURE — 93971 EXTREMITY STUDY: CPT

## 2020-11-06 RX ORDER — CHLORHEXIDINE GLUCONATE 4 G/100ML
SOLUTION TOPICAL DAILY PRN
Status: CANCELLED | OUTPATIENT
Start: 2020-11-06

## 2020-11-10 ENCOUNTER — TELEPHONE (OUTPATIENT)
Dept: OBGYN CLINIC | Facility: HOSPITAL | Age: 50
End: 2020-11-10

## 2020-11-10 DIAGNOSIS — S83.282A TEAR OF LATERAL MENISCUS OF LEFT KNEE, CURRENT, UNSPECIFIED TEAR TYPE, INITIAL ENCOUNTER: Primary | ICD-10-CM

## 2020-11-12 ENCOUNTER — EVALUATION (OUTPATIENT)
Dept: PHYSICAL THERAPY | Facility: CLINIC | Age: 50
End: 2020-11-12
Payer: COMMERCIAL

## 2020-11-12 DIAGNOSIS — S83.282D TEAR OF LATERAL MENISCUS OF LEFT KNEE, UNSPECIFIED TEAR TYPE, UNSPECIFIED WHETHER OLD OR CURRENT TEAR, SUBSEQUENT ENCOUNTER: Primary | ICD-10-CM

## 2020-11-12 DIAGNOSIS — M25.562 LEFT KNEE PAIN, UNSPECIFIED CHRONICITY: ICD-10-CM

## 2020-11-12 DIAGNOSIS — R26.9 GAIT ABNORMALITY: ICD-10-CM

## 2020-11-12 PROCEDURE — 97163 PT EVAL HIGH COMPLEX 45 MIN: CPT | Performed by: PHYSICAL THERAPIST

## 2020-11-12 PROCEDURE — 97110 THERAPEUTIC EXERCISES: CPT | Performed by: PHYSICAL THERAPIST

## 2020-11-16 ENCOUNTER — TELEPHONE (OUTPATIENT)
Dept: OBGYN CLINIC | Facility: HOSPITAL | Age: 50
End: 2020-11-16

## 2020-11-17 ENCOUNTER — OFFICE VISIT (OUTPATIENT)
Dept: PHYSICAL THERAPY | Facility: CLINIC | Age: 50
End: 2020-11-17
Payer: COMMERCIAL

## 2020-11-17 DIAGNOSIS — R26.9 GAIT ABNORMALITY: ICD-10-CM

## 2020-11-17 DIAGNOSIS — M25.562 LEFT KNEE PAIN, UNSPECIFIED CHRONICITY: ICD-10-CM

## 2020-11-17 DIAGNOSIS — S83.282D TEAR OF LATERAL MENISCUS OF LEFT KNEE, UNSPECIFIED TEAR TYPE, UNSPECIFIED WHETHER OLD OR CURRENT TEAR, SUBSEQUENT ENCOUNTER: Primary | ICD-10-CM

## 2020-11-17 PROCEDURE — 97140 MANUAL THERAPY 1/> REGIONS: CPT | Performed by: PHYSICAL THERAPIST

## 2020-11-17 PROCEDURE — 97110 THERAPEUTIC EXERCISES: CPT | Performed by: PHYSICAL THERAPIST

## 2020-11-20 ENCOUNTER — APPOINTMENT (OUTPATIENT)
Dept: PHYSICAL THERAPY | Facility: CLINIC | Age: 50
End: 2020-11-20
Payer: COMMERCIAL

## 2020-11-24 ENCOUNTER — OFFICE VISIT (OUTPATIENT)
Dept: PHYSICAL THERAPY | Facility: CLINIC | Age: 50
End: 2020-11-24
Payer: COMMERCIAL

## 2020-11-24 DIAGNOSIS — S83.282D TEAR OF LATERAL MENISCUS OF LEFT KNEE, UNSPECIFIED TEAR TYPE, UNSPECIFIED WHETHER OLD OR CURRENT TEAR, SUBSEQUENT ENCOUNTER: Primary | ICD-10-CM

## 2020-11-24 DIAGNOSIS — R26.9 GAIT ABNORMALITY: ICD-10-CM

## 2020-11-24 DIAGNOSIS — M25.562 LEFT KNEE PAIN, UNSPECIFIED CHRONICITY: ICD-10-CM

## 2020-11-24 PROCEDURE — 97110 THERAPEUTIC EXERCISES: CPT

## 2020-11-24 PROCEDURE — 97112 NEUROMUSCULAR REEDUCATION: CPT

## 2020-11-30 ENCOUNTER — OFFICE VISIT (OUTPATIENT)
Dept: OBGYN CLINIC | Facility: CLINIC | Age: 50
End: 2020-11-30
Payer: COMMERCIAL

## 2020-11-30 VITALS — HEIGHT: 70 IN | WEIGHT: 280 LBS | BODY MASS INDEX: 40.09 KG/M2

## 2020-11-30 DIAGNOSIS — S83.282A TEAR OF LATERAL MENISCUS OF LEFT KNEE, CURRENT, UNSPECIFIED TEAR TYPE, INITIAL ENCOUNTER: Primary | ICD-10-CM

## 2020-11-30 PROCEDURE — 99213 OFFICE O/P EST LOW 20 MIN: CPT | Performed by: ORTHOPAEDIC SURGERY

## 2020-12-07 ENCOUNTER — OFFICE VISIT (OUTPATIENT)
Dept: PHYSICAL THERAPY | Facility: CLINIC | Age: 50
End: 2020-12-07
Payer: COMMERCIAL

## 2020-12-07 DIAGNOSIS — R26.9 GAIT ABNORMALITY: ICD-10-CM

## 2020-12-07 DIAGNOSIS — M25.562 LEFT KNEE PAIN, UNSPECIFIED CHRONICITY: ICD-10-CM

## 2020-12-07 DIAGNOSIS — S83.282D TEAR OF LATERAL MENISCUS OF LEFT KNEE, CURRENT, UNSPECIFIED TEAR TYPE, SUBSEQUENT ENCOUNTER: Primary | ICD-10-CM

## 2020-12-07 PROCEDURE — 97530 THERAPEUTIC ACTIVITIES: CPT | Performed by: PHYSICAL THERAPIST

## 2020-12-07 PROCEDURE — 97110 THERAPEUTIC EXERCISES: CPT | Performed by: PHYSICAL THERAPIST

## 2020-12-07 PROCEDURE — 97112 NEUROMUSCULAR REEDUCATION: CPT | Performed by: PHYSICAL THERAPIST

## 2020-12-18 ENCOUNTER — HOSPITAL ENCOUNTER (OUTPATIENT)
Dept: VASCULAR ULTRASOUND | Facility: HOSPITAL | Age: 50
Discharge: HOME | End: 2020-12-18
Payer: COMMERCIAL

## 2020-12-18 DIAGNOSIS — I83.813 VARICOSE VEINS OF BOTH LOWER EXTREMITIES WITH PAIN: ICD-10-CM

## 2020-12-18 PROCEDURE — 93970 EXTREMITY STUDY: CPT

## 2020-12-18 PROCEDURE — 93970 EXTREMITY STUDY: CPT | Performed by: SURGERY

## 2021-01-19 ENCOUNTER — OFFICE VISIT (OUTPATIENT)
Dept: VASCULAR SURGERY | Facility: CLINIC | Age: 51
End: 2021-01-19
Payer: COMMERCIAL

## 2021-01-19 VITALS
OXYGEN SATURATION: 98 % | BODY MASS INDEX: 39.22 KG/M2 | TEMPERATURE: 99 F | WEIGHT: 274 LBS | RESPIRATION RATE: 20 BRPM | DIASTOLIC BLOOD PRESSURE: 74 MMHG | SYSTOLIC BLOOD PRESSURE: 118 MMHG | HEIGHT: 70 IN | HEART RATE: 70 BPM

## 2021-01-19 DIAGNOSIS — I83.893 SYMPTOMATIC VARICOSE VEINS OF BOTH LOWER EXTREMITIES: Primary | ICD-10-CM

## 2021-01-19 PROCEDURE — 99213 OFFICE O/P EST LOW 20 MIN: CPT | Performed by: SURGERY

## 2021-01-19 NOTE — LETTER
January 19, 2021     MD [address]  [address] [facility] [place] [address]    Patient: Edgar Mae   YOB: 1970   Date of Visit: 1/19/2021       Dear Dr Martin Guess:    Thank you for referring Edgar Mae to me for evaluation  Below are my notes for this consultation  If you have questions, please do not hesitate to call me  I look forward to following your patient along with you           Sincerely,        Shahzad Christy MD        CC: No Recipients

## 2021-01-19 NOTE — PROGRESS NOTES
Assessment/Plan:    Presents with bilateral lower extremity symptomatic varicosities with reflux in the right greater saphenous vein with a competent left  She has heaviness and aching after long day standing on the right leg in the medial thigh and medial calf and on the left leg lateral thigh calf  She has history of calf vein thrombosis which was read current back in August of 2020  She was placed on Eliquis at that time and is seeing Hematology and is undergoing a hypercoagulable state workup  She has a office visit in the near future with hematology will wait for their clearance to stop her anticoagulation, assuming her workup is negative  She has also been started on a new Crohn's medication and will be checking with GI with regard to issues of general anesthesia  Plan:  Follow-up in the office after her upcoming Hematology consultation and likely planning endovenous laser ablation of the right greater saphenous vein with multiple stab phlebectomies if cleared by Hematology  Diagnoses and all orders for this visit:    Symptomatic varicose veins of both lower extremities        Subjective:      Patient ID: Itzel Bonilla is a 52 y o  female  Patient is here to review results of her LEVDR done on 12/18/20  She does report wearing a compression, and some bulging veins  She does not report swelling, tiredness/heaviness, discoloration, or wounds  Patient is an every day smoker  Her medications include eliquis 5 mg  HPI    The following portions of the patient's history were reviewed and updated as appropriate: allergies, current medications, past family history, past medical history, past social history, past surgical history and problem list     Review of Systems   Constitutional: Positive for appetite change  HENT: Negative  Eyes: Negative  Respiratory: Negative  Cardiovascular: Negative  Gastrointestinal: Negative  Endocrine: Positive for cold intolerance     Genitourinary: Negative  Musculoskeletal: Positive for back pain  Skin: Positive for color change  Allergic/Immunologic: Positive for immunocompromised state  Neurological: Negative  Hematological: Bruises/bleeds easily  Psychiatric/Behavioral: Negative  Objective:      /74   Pulse 70   Temp 99 °F (37 2 °C)   Resp 20   Ht 5' 10" (1 778 m)   Wt 124 kg (274 lb)   SpO2 98%   BMI 39 31 kg/m²          Physical Exam      Oriented x3 no evidence of clinical depression  Eyes:  Sclera non-icteric    Skin: normal without evidence of inflammation    Neck is supple carotid pulses equal bilaterally no bruits heard    Chest lungs clear, heart regular rhythm  Abdomen soft nontender no evidence of pulsatile masses  Pulses are palpable bilateral Femoral  Popliteal, DP and PT  Upon standing she has large varicosities bilateral lower extremity on the right medial thigh to medial and lateral calf and on the left lateral thigh and calf  Multiple areas of telangiectasia no history of venous stasis ulceration or stasis dermatitis  Neurological exam intact cranial nerves 2-12 grossly intact no gross motor sensory deficits detected  Imaging viewed and reviewed with Patient  I have reviewed and made appropriate changes to the review of systems input by the medical assistant      Vitals:    01/19/21 1316   BP: 118/74   Pulse: 70   Resp: 20   Temp: 99 °F (37 2 °C)   SpO2: 98%   Weight: 124 kg (274 lb)   Height: 5' 10" (1 778 m)       Patient Active Problem List   Diagnosis    Cervical spinal stenosis    Radiculopathy, cervical    RLQ abdominal tenderness    Acute deep vein thrombosis (DVT) of left peroneal vein (HCC)    Varicose veins of both lower extremities with pain    History of meniscal tear    Tear of lateral meniscus of left knee, current    Symptomatic varicose veins of both lower extremities       Past Surgical History:   Procedure Laterality Date    ADENOIDECTOMY      ANKLE SURGERY Left     reconstruction     SECTION      x2    DIAGNOSTIC LAPAROSCOPY      HYSTERECTOMY      AR ARTHRODESIS ANT INTERBODY INC DISCECTOMY, CERVICAL BELOW C2 Bilateral 2019    Procedure: C5/6 ANTERIOR CERVICAL DECOMPRESSION AND FUSION;  Surgeon: Yunier Hernandez MD;  Location: [facility];  Service: Neurosurgery    TONSILLECTOMY      TUBAL LIGATION         Family History   Adopted: Yes       Social History     Socioeconomic History    Marital status: Legally      Spouse name: Not on file    Number of children: Not on file    Years of education: Not on file    Highest education level: Not on file   Occupational History    Not on file   Social Needs    Financial resource strain: Not on file    Food insecurity     Worry: Not on file     Inability: Not on file    Transportation needs     Medical: Not on file     Non-medical: Not on file   Tobacco Use    Smoking status: Current Every Day Smoker     Packs/day:  00     Types: Cigarettes    Smokeless tobacco: Never Used   Substance and Sexual Activity    Alcohol use: Never     Frequency: Never    Drug use: Never    Sexual activity: Not on file   Lifestyle    Physical activity     Days per week: Not on file     Minutes per session: Not on file    Stress: Not on file   Relationships    Social connections     Talks on phone: Not on file     Gets together: Not on file     Attends Restoration[employer] service: Not on file     Active member of club or organization: Not on file     Attends meetings of clubs or organizations: Not on file     Relationship status: Not on file    Intimate partner violence     Fear of current or ex partner: Not on file     Emotionally abused: Not on file     Physically abused: Not on file     Forced sexual activity: Not on file   Other Topics Concern    Not on file   Social History Narrative    Not on file       Allergies   Allergen Reactions    Amoxicillin Rash    Codeine Rash and Other (See Comments)     Rash    Diclofenac Rash    Penicillins Other (See Comments)     Rash    Sulfa Antibiotics Other (See Comments) and Hives     Rash  Other reaction(s):  Other (See Comments)  Rash    Diclofenac Sodium Rash    Etodolac Hives, Rash and Other (See Comments)     Rash           Current Outpatient Medications:     apixaban (Eliquis) 5 mg, Take 5 mg by mouth 2 (two) times a day, Disp: , Rfl:     cholecalciferol (VITAMIN D3) 1,000 units tablet, Take 2,000 Units by mouth daily, Disp: , Rfl:     Cyanocobalamin (VITAMIN B 12 PO), Take 1 tablet by mouth daily , Disp: , Rfl:     ferrous sulfate (IRON SUPPLEMENT) 325 (65 Fe) mg tablet, Take 325 mg by mouth daily with breakfast, Disp: , Rfl:     levothyroxine 100 mcg tablet, Take 100 mcg by mouth daily, Disp: , Rfl:     pramipexole (MIRAPEX) 0 5 mg tablet, Take 0 5 mg by mouth 2 (two) times a day , Disp: , Rfl:     topiramate (TOPAMAX) 25 mg sprinkle capsule, Take 25 mg by mouth daily in the early morning, Disp: , Rfl:     topiramate (TOPAMAX) 50 MG tablet, Take 50 mg by mouth daily at bedtime , Disp: , Rfl:     Ustekinumab (STELARA IV), Infuse into a venous catheter, Disp: , Rfl:     lidocaine (LIDODERM) 5 %, Apply 1 patch topically daily Remove & Discard patch within 12 hours or as directed by MD, Disp: 30 patch, Rfl: 0    mesalamine (PENTASA) 500 mg CR capsule, Take 1,000 mg by mouth 4 (four) times a day, Disp: , Rfl:     pantoprazole (PROTONIX) 40 mg tablet, Take 40 mg by mouth daily , Disp: , Rfl:     tiZANidine (ZANAFLEX) 4 mg tablet, Take 1 tablet (4 mg total) by mouth 2 (two) times a day (Patient taking differently: Take 4 mg by mouth 2 (two) times a day as needed ), Disp: 60 tablet, Rfl: 0

## 2021-01-19 NOTE — PATIENT INSTRUCTIONS
Presents with bilateral lower extremity symptomatic varicosities with reflux in the right greater saphenous vein with a competent left  She has heaviness and aching after long day standing on the right leg in the medial thigh and medial calf and on the left leg lateral thigh calf  She has history of calf vein thrombosis which was read current back in August of 2020  She was placed on Eliquis at that time and is seeing Hematology and is undergoing a hypercoagulable state workup  She has a office visit in the near future with hematology will wait for their clearance to stop her anticoagulation, assuming her workup is negative  She has also been started on a new Crohn's medication and will be checking with GI with regard to issues of general anesthesia  Plan:  Follow-up in the office after her upcoming Hematology consultation and likely planning endovenous laser ablation of the right greater saphenous vein with multiple stab phlebectomies if cleared by Hematology

## 2021-03-09 ENCOUNTER — OFFICE VISIT (OUTPATIENT)
Dept: VASCULAR SURGERY | Facility: CLINIC | Age: 51
End: 2021-03-09
Payer: COMMERCIAL

## 2021-03-09 VITALS
HEART RATE: 68 BPM | BODY MASS INDEX: 39.22 KG/M2 | HEIGHT: 70 IN | SYSTOLIC BLOOD PRESSURE: 108 MMHG | TEMPERATURE: 98.8 F | DIASTOLIC BLOOD PRESSURE: 52 MMHG | WEIGHT: 274 LBS

## 2021-03-09 DIAGNOSIS — I83.813 VARICOSE VEINS OF BOTH LOWER EXTREMITIES WITH PAIN: Primary | ICD-10-CM

## 2021-03-09 DIAGNOSIS — I83.893 SYMPTOMATIC VARICOSE VEINS OF BOTH LOWER EXTREMITIES: ICD-10-CM

## 2021-03-09 PROCEDURE — 99213 OFFICE O/P EST LOW 20 MIN: CPT | Performed by: SURGERY

## 2021-03-09 NOTE — PATIENT INSTRUCTIONS
Follow-up regarding superficial reflux and varicosities bilaterally  Her legs are somewhat better in her graduated knee-high compression stockings  No evidence of swelling or skin changes  Her hypercoagulable workup has been negative and she follows up with Hematology next month at which time they will discontinue her anticoagulation        Plan:  Continue knee-high compression stockings and follow-up office appointment in 6 months

## 2021-03-09 NOTE — PROGRESS NOTES
Assessment/Plan:    Follow-up regarding superficial reflux and varicosities bilaterally  Her legs are somewhat better in her graduated knee-high compression stockings  No evidence of swelling or skin changes  Her hypercoagulable workup has been negative and she follows up with Hematology next month at which time they will discontinue her anticoagulation  Plan:  Continue knee-high compression stockings and follow-up office appointment in 6 months       Diagnoses and all orders for this visit:    Varicose veins of both lower extremities with pain    Symptomatic varicose veins of both lower extremities  -     Jobst Stockings        Subjective:      Patient ID: Suzanne Morton is a 54 y o  female  Pt is here today to review results of LEVDR done 12/18/2020  Pt c/o bulging painful veins that cause her legs to ache and feel heavy  Her right leg is worse than the left leg  She denies any bleeding veins  Pt has a hx of DVT in her left leg  She has been wearing her compression stockings daily  Pt is taking Eliquis  She is a current smoker  HPI    The following portions of the patient's history were reviewed and updated as appropriate: allergies, current medications, past family history, past medical history, past social history, past surgical history and problem list     Review of Systems   Constitutional: Negative  HENT: Negative  Eyes: Negative  Respiratory: Negative  Cardiovascular:        Painful veins   Gastrointestinal: Positive for constipation and diarrhea  Endocrine: Negative  Genitourinary: Negative  Musculoskeletal: Positive for arthralgias  Skin: Negative  Allergic/Immunologic: Negative  Neurological: Negative  Hematological: Bruises/bleeds easily  Psychiatric/Behavioral: Negative  Objective: There were no vitals taken for this visit           Physical Exam   Upon standing varicosities seen right medial thigh and knee and lateral thigh and on the left lateral thigh  No evidence of stasis dermatitis mild hyperpigmentation and brawny induration    I have reviewed and made appropriate changes to the review of systems input by the medical assistant      Vitals:    21 1418   BP: 108/52   BP Location: Left arm   Patient Position: Sitting   Cuff Size: Standard   Pulse: 68   Temp: 98 8 °F (37 1 °C)   TempSrc: Tympanic   Weight: 124 kg (274 lb)   Height: 5' 10" (1 778 m)       Patient Active Problem List   Diagnosis    Cervical spinal stenosis    Radiculopathy, cervical    RLQ abdominal tenderness    Acute deep vein thrombosis (DVT) of left peroneal vein (HCC)    Varicose veins of both lower extremities with pain    History of meniscal tear    Tear of lateral meniscus of left knee, current    Symptomatic varicose veins of both lower extremities       Past Surgical History:   Procedure Laterality Date    ADENOIDECTOMY      ANKLE SURGERY Left     reconstruction     SECTION      x2    DIAGNOSTIC LAPAROSCOPY      HYSTERECTOMY      CA ARTHRODESIS ANT INTERBODY INC DISCECTOMY, CERVICAL BELOW C2 Bilateral 2019    Procedure: C5/6 ANTERIOR CERVICAL DECOMPRESSION AND FUSION;  Surgeon: Tika George MD;  Location[facility];  Service: Neurosurgery    TONSILLECTOMY      TUBAL LIGATION         Family History   Adopted: Yes       Social History     Socioeconomic History    Marital status: Legally      Spouse name: Not on file    Number of children: Not on file    Years of education: Not on file    Highest education level: Not on file   Occupational History    Not on file   Social Needs    Financial resource strain: Not on file    Food insecurity     Worry: Not on file     Inability: Not on file   [employer] needs     Medical: Not on file     Non-medical: Not on file   Tobacco Use    Smoking status: Current Every Day Smoker     Packs/day: 1 00     Types: Cigarettes    Smokeless tobacco: Never Used   Substance and Sexual Activity  Alcohol use: Never     Frequency: Never    Drug use: Never    Sexual activity: Not on file   Lifestyle    Physical activity     Days per week: Not on file     Minutes per session: Not on file    Stress: Not on file   Relationships    Social connections     Talks on phone: Not on file     Gets together: Not on file     Attends Synagogue service: Not on file     Active member of club or organization: Not on file     Attends meetings of clubs or organizations: Not on file     Relationship status: Not on file    Intimate partner violence     Fear of current or ex partner: Not on file     Emotionally abused: Not on file     Physically abused: Not on file     Forced sexual activity: Not on file   Other Topics Concern    Not on file   Social History Narrative    Not on file       Allergies   Allergen Reactions    Amoxicillin Rash    Codeine Rash and Other (See Comments)     Rash    Diclofenac Rash    Penicillins Other (See Comments)     Rash    Sulfa Antibiotics Other (See Comments) and Hives     Rash  Other reaction(s):  Other (See Comments)  Rash    Diclofenac Sodium Rash    Etodolac Hives, Rash and Other (See Comments)     Rash           Current Outpatient Medications:     apixaban (Eliquis) 5 mg, Take 2 5 mg by mouth 2 (two) times a day , Disp: , Rfl:     cholecalciferol (VITAMIN D3) 1,000 units tablet, Take 2,000 Units by mouth daily, Disp: , Rfl:     Cyanocobalamin (VITAMIN B 12 PO), Take 1 tablet by mouth daily , Disp: , Rfl:     ferrous sulfate (IRON SUPPLEMENT) 325 (65 Fe) mg tablet, Take 325 mg by mouth daily with breakfast, Disp: , Rfl:     levothyroxine 100 mcg tablet, Take 100 mcg by mouth daily, Disp: , Rfl:     pramipexole (MIRAPEX) 0 5 mg tablet, Take 0 5 mg by mouth 2 (two) times a day , Disp: , Rfl:     topiramate (TOPAMAX) 25 mg sprinkle capsule, Take 25 mg by mouth daily in the early morning, Disp: , Rfl:     topiramate (TOPAMAX) 50 MG tablet, Take 50 mg by mouth daily at bedtime , Disp: , Rfl:     Ustekinumab (STELARA IV), Infuse into a venous catheter, Disp: , Rfl:     lidocaine (LIDODERM) 5 %, Apply 1 patch topically daily Remove & Discard patch within 12 hours or as directed by MD, Disp: 30 patch, Rfl: 0    mesalamine (PENTASA) 500 mg CR capsule, Take 1,000 mg by mouth 4 (four) times a day, Disp: , Rfl:     pantoprazole (PROTONIX) 40 mg tablet, Take 40 mg by mouth daily , Disp: , Rfl:     tiZANidine (ZANAFLEX) 4 mg tablet, Take 1 tablet (4 mg total) by mouth 2 (two) times a day (Patient taking differently: Take 4 mg by mouth 2 (two) times a day as needed ), Disp: 60 tablet, Rfl: 0

## 2021-03-09 NOTE — LETTER
March 9, 2021     MD [address]  [address] [place] [address]    Patient: Porsche Guaman   YOB: 1970   Date of Visit: 3/9/2021       Dear Dr Peewee Dalal:    Thank you for referring Porsche Guaman to me for evaluation  Below are my notes for this consultation  If you have questions, please do not hesitate to call me  I look forward to following your patient along with you           Sincerely,        Duglas Kay MD        CC: No Recipients

## 2021-03-23 ENCOUNTER — OFFICE VISIT (OUTPATIENT)
Dept: OBGYN CLINIC | Facility: CLINIC | Age: 51
End: 2021-03-23
Payer: COMMERCIAL

## 2021-03-23 VITALS
DIASTOLIC BLOOD PRESSURE: 78 MMHG | WEIGHT: 274 LBS | SYSTOLIC BLOOD PRESSURE: 111 MMHG | HEIGHT: 70 IN | BODY MASS INDEX: 39.22 KG/M2 | HEART RATE: 66 BPM

## 2021-03-23 DIAGNOSIS — S83.282D OTHER TEAR OF LATERAL MENISCUS OF LEFT KNEE AS CURRENT INJURY, SUBSEQUENT ENCOUNTER: Primary | ICD-10-CM

## 2021-03-23 DIAGNOSIS — Z01.812 PRE-OPERATIVE LABORATORY EXAMINATION: ICD-10-CM

## 2021-03-23 PROCEDURE — 99213 OFFICE O/P EST LOW 20 MIN: CPT | Performed by: ORTHOPAEDIC SURGERY

## 2021-03-23 RX ORDER — CHLORHEXIDINE GLUCONATE 0.12 MG/ML
15 RINSE ORAL ONCE
Status: CANCELLED | OUTPATIENT
Start: 2021-05-05 | End: 2021-03-23

## 2021-03-23 RX ORDER — CEFAZOLIN SODIUM 2 G/50ML
2000 SOLUTION INTRAVENOUS ONCE
Status: CANCELLED | OUTPATIENT
Start: 2021-05-05 | End: 2021-03-23

## 2021-03-23 RX ORDER — CHLORHEXIDINE GLUCONATE 4 G/100ML
SOLUTION TOPICAL DAILY PRN
Status: CANCELLED | OUTPATIENT
Start: 2021-05-05

## 2021-03-23 NOTE — PROGRESS NOTES
Assessment/Plan:  Assessment/Plan   Diagnoses and all orders for this visit:    Other tear of lateral meniscus of left knee as current injury, subsequent encounter  -     Case request operating room: ARTHROSCOPY KNEE; Standing  -     Ambulatory referral to Physical Therapy; Future  -     Case request operating room: ARTHROSCOPY KNEE    Pre-operative laboratory examination  -     CBC and Platelet; Future  -     Comprehensive metabolic panel; Future  -     Ambulatory referral to Physical Therapy; Future    Other orders  -     Nursing Communication Warmimg Interventions Implemented; Standing  -     Nursing Communication CHG bath, have staff wash entire body (neck down) per pre-op bathing protocol  Routine, evening prior to, and day of surgery ; Standing  -     Nursing Communication Swab both nares with Povidone-Iodine solution, EXCLUDE if patient has shellfish/Iodine allergy  Routine, day of surgery, on call to OR; Standing  -     chlorhexidine (PERIDEX) 0 12 % oral rinse 15 mL  -     Void on call to OR; Standing  -     Insert peripheral IV; Standing  -     chlorhexidine (HIBICLENS) 4 % topical liquid  -     ceFAZolin (ANCEF) IVPB (premix in dextrose) 2,000 mg 50 mL         discussed with patient both continued conservative management and surgical intervention via left knee arthroscopy  Patient elects to move forward with surgical intervention  Procedure, prognosis, benefits, and risks were discussed with patient at time of visit  Patient expresses understanding  Informed consent was obtained at time of visit  Surgery is tentatively scheduled for 5/5/2021  She will be seen for follow-up postoperatively  the patient has opted for elective arthroscopy of her left knee    All risks, complications, benefits were discussed in great detail including bleeding, infection, blood clots, pain, stiffness, neurovascular damage, fractures, dislocations, the possibility of loss of life or limb for surgery, heart attack, stroke, etcetera     the patient is currently on Eliquis  It will be discontinued in the middle of next month  Her surgery is tentatively scheduled for Wednesday  May 5, 2021     physical exam shows limited range of motion A positive Josue's along the lateral side with joint line tenderness  Subjective:   Patient ID: Yuriy Wolf is a 54 y o  female  HPI  Patient presents with complaint of ongoing left knee pain secondary to meniscus tear  Patient was last seen in regards to this issue on 2020, at which time she was referred for continued physical therapy  She states that she did have  Improvement with physical therapy, and has since moved to [place], [place]  Unfortunately, her symptoms never completely resolved, and have been getting worse over the past few months  She describes her pain as being lateral, exacerbated with weight-bearing activity, sharp with certain motions, specifically pivoting motions  She reports daily swelling of the knee joint  She also notes warm with of instability  She is interested in pursuing surgical intervention at this time      The following portions of the patient's history were reviewed and updated as appropriate: allergies, current medications, past family history, past medical history, past social history, past surgical history and problem list     Past Medical History:   Diagnosis Date    Bulging of cervical intervertebral disc     Celiac disease     Crohn disease ([facility] [address] [facility] )     Disease of thyroid gland     DVT [facility]deep venous thrombosis) ([facility] [address] [facility]     Migraines     Neck pain, chronic     Polycystic ovarian syndrome     PONV (postoperative nausea and vomiting)     TMJ (temporomandibular joint syndrome)      Past Surgical History:   Procedure Laterality Date    ADENOIDECTOMY      ANKLE SURGERY Left     reconstruction     SECTION      x2    DIAGNOSTIC LAPAROSCOPY      HYSTERECTOMY      SC ARTHRODESIS ANT INTERBODY INC DISCECTOMY, CERVICAL BELOW C2 Bilateral 4/30/2019    Procedure: C5/6 ANTERIOR CERVICAL DECOMPRESSION AND FUSION;  Surgeon: Tita Ellsworth MD;  Location: [facility];  Service: Neurosurgery    TONSILLECTOMY      TUBAL LIGATION       Family History   Adopted: Yes     Social History     Socioeconomic History    Marital status: Legally      Spouse name: None    Number of children: None    Years of education: None    Highest education level: None   Occupational History    None   Social Needs    Financial resource strain: None    Food insecurity     Worry: None     Inability: None    Transportation needs     Medical: None     Non-medical: None   Tobacco Use    Smoking status: Current Every Day Smoker     Packs/day: 1 00     Types: Cigarettes    Smokeless tobacco: Never Used   Substance and Sexual Activity    Alcohol use: Never     Frequency: Never    Drug use: Never    Sexual activity: None   Lifestyle    Physical activity     Days per week: None     Minutes per session: None    Stress: None   Relationships    Social connections     Talks on phone: None     Gets together: None     Attends Nondenominational service: None     Active member of club or organization: None     Attends meetings of clubs or organizations: None     Relationship status: None    Intimate partner violence     Fear of current or ex partner: None     Emotionally abused: None     Physically abused: None     Forced sexual activity: None   Other Topics Concern    None   Social History Narrative    None       Current Outpatient Medications:     apixaban (Eliquis) 5 mg, Take 2 5 mg by mouth 2 (two) times a day , Disp: , Rfl:     cholecalciferol (VITAMIN D3) 1,000 units tablet, Take 2,000 Units by mouth daily, Disp: , Rfl:     Cyanocobalamin (VITAMIN B 12 PO), Take 1 tablet by mouth daily , Disp: , Rfl:     ferrous sulfate (IRON SUPPLEMENT) 325 (65 Fe) mg tablet, Take 325 mg by mouth daily with breakfast, Disp: , Rfl:     levothyroxine 100 mcg tablet, Take 100 mcg by mouth daily, Disp: , Rfl:     lidocaine (LIDODERM) 5 %, Apply 1 patch topically daily Remove & Discard patch within 12 hours or as directed by MD, Disp: 30 patch, Rfl: 0    mesalamine (PENTASA) 500 mg CR capsule, Take 1,000 mg by mouth 4 (four) times a day, Disp: , Rfl:     pantoprazole (PROTONIX) 40 mg tablet, Take 40 mg by mouth daily , Disp: , Rfl:     pramipexole (MIRAPEX) 0 5 mg tablet, Take 0 5 mg by mouth 2 (two) times a day , Disp: , Rfl:     tiZANidine (ZANAFLEX) 4 mg tablet, Take 1 tablet (4 mg total) by mouth 2 (two) times a day (Patient taking differently: Take 4 mg by mouth 2 (two) times a day as needed ), Disp: 60 tablet, Rfl: 0    topiramate (TOPAMAX) 25 mg sprinkle capsule, Take 25 mg by mouth daily in the early morning, Disp: , Rfl:     topiramate (TOPAMAX) 50 MG tablet, Take 50 mg by mouth daily at bedtime , Disp: , Rfl:     Ustekinumab (STELARA IV), Infuse into a venous catheter, Disp: , Rfl:     Allergies   Allergen Reactions    Amoxicillin Rash    Codeine Rash and Other (See Comments)     Rash    Diclofenac Rash    Penicillins Other (See Comments)     Rash    Sulfa Antibiotics Other (See Comments) and Hives     Rash  Other reaction(s): Other (See Comments)  Rash    Diclofenac Sodium Rash    Etodolac Hives, Rash and Other (See Comments)     Rash         Review of Systems   Constitutional: Negative for chills, fever and unexpected weight change  HENT: Negative for hearing loss, nosebleeds and sore throat  Eyes: Negative for pain, redness and visual disturbance  Respiratory: Negative for cough, shortness of breath and wheezing  Cardiovascular: Negative for chest pain, palpitations and leg swelling  Gastrointestinal: Negative for abdominal pain, nausea and vomiting  Endocrine: Negative for polydipsia and polyuria  Genitourinary: Negative for dysuria and hematuria  Musculoskeletal:        As noted in HPI   Skin: Negative for rash and wound     Neurological: Negative for dizziness, numbness and headaches  Psychiatric/Behavioral: Negative for decreased concentration and suicidal ideas  The patient is not nervous/anxious  Objective:  /78 (BP Location: Left arm, Patient Position: Sitting, Cuff Size: Large)   Pulse 66   Ht 5' 10" (1 778 m)   Wt 124 kg (274 lb)   BMI 39 31 kg/m²     Ortho Exam     Left knee -    Patient ambulates with  antalgic gait pattern favoring the right lower extremity  Uses  no assistive device   genu valgus anatomical deformity  Skin is warm and dry to touch with no signs of erythema, ecchymosis, infection   mild soft tissue swelling,  mild effusion noted  ROM  0°- 110°  TTP over  Medial joint line, medial tibial plateau, pes anserine bursa, minimally tender over lateral joint line  Flexor and extensor mechanisms intact  Knee is stable to varus, valgus, anterior, and posterior stress   positive lateral Josue's maneuver with pain exacerbation  Patella tracks centrally  with palpable crepitus  Calf compartments are soft and supple  2+ TP and DP pulses with brisk capillary refill to the toes  Sural, saphenous, tibial, superficial and deep peroneal motor and sensory distributions intact  Sensation light touch intact distally    Physical Exam  Constitutional:       Appearance: She is well-developed  HENT:      Right Ear: External ear normal       Left Ear: External ear normal       Nose: Nose normal    Eyes:      Conjunctiva/sclera: Conjunctivae normal       Pupils: Pupils are equal, round, and reactive to light  Neck:      Musculoskeletal: Normal range of motion  Pulmonary:      Effort: Pulmonary effort is normal    Musculoskeletal: Normal range of motion  Skin:     General: Skin is warm and dry  Neurological:      Mental Status: She is alert and oriented to person, place, and time  Psychiatric:         Behavior: Behavior normal          Thought Content:  Thought content normal          Judgment: Judgment normal  Imaging:   Attending Physician has personally reviewed pertinent imaging in PACS, impression is as follows:    Review of MRI series taken  11/3/2020 of the  left  knee is significant for  Complex lateral meniscal tear in the setting of tricompartmental osteoarthritis    Scribe Attestation    I,:  Lucia Starr am acting as a scribe while in the presence of the attending physician :       I,:  Marifer Wolfe, DO personally performed the services described in this documentation    as scribed in my presence :

## 2021-03-24 ENCOUNTER — PREP FOR PROCEDURE (OUTPATIENT)
Dept: OBGYN CLINIC | Facility: CLINIC | Age: 51
End: 2021-03-24

## 2021-04-12 ENCOUNTER — PREP FOR PROCEDURE (OUTPATIENT)
Dept: OBGYN CLINIC | Facility: CLINIC | Age: 51
End: 2021-04-12

## 2021-04-22 ENCOUNTER — TELEPHONE (OUTPATIENT)
Dept: OBGYN CLINIC | Facility: HOSPITAL | Age: 51
End: 2021-04-22

## 2021-04-23 ENCOUNTER — APPOINTMENT (OUTPATIENT)
Dept: LAB | Facility: CLINIC | Age: 51
End: 2021-04-23
Payer: COMMERCIAL

## 2021-04-23 DIAGNOSIS — Z01.812 PRE-OPERATIVE LABORATORY EXAMINATION: ICD-10-CM

## 2021-04-23 LAB
ALBUMIN SERPL BCP-MCNC: 3.9 G/DL (ref 3.5–5)
ALP SERPL-CCNC: 57 U/L (ref 46–116)
ALT SERPL W P-5'-P-CCNC: 18 U/L (ref 12–78)
ANION GAP SERPL CALCULATED.3IONS-SCNC: 8 MMOL/L (ref 4–13)
AST SERPL W P-5'-P-CCNC: 8 U/L (ref 5–45)
BILIRUB SERPL-MCNC: 0.52 MG/DL (ref 0.2–1)
BUN SERPL-MCNC: 14 MG/DL (ref 5–25)
CALCIUM SERPL-MCNC: 9.2 MG/DL (ref 8.3–10.1)
CHLORIDE SERPL-SCNC: 114 MMOL/L (ref 100–108)
CO2 SERPL-SCNC: 19 MMOL/L (ref 21–32)
CREAT SERPL-MCNC: 0.93 MG/DL (ref 0.6–1.3)
ERYTHROCYTE [DISTWIDTH] IN BLOOD BY AUTOMATED COUNT: 13.2 % (ref 11.6–15.1)
GFR SERPL CREATININE-BSD FRML MDRD: 72 ML/MIN/1.73SQ M
GLUCOSE P FAST SERPL-MCNC: 87 MG/DL (ref 65–99)
HCT VFR BLD AUTO: 42.7 % (ref 34.8–46.1)
HGB BLD-MCNC: 13.4 G/DL (ref 11.5–15.4)
MCH RBC QN AUTO: 29.1 PG (ref 26.8–34.3)
MCHC RBC AUTO-ENTMCNC: 31.4 G/DL (ref 31.4–37.4)
MCV RBC AUTO: 93 FL (ref 82–98)
PLATELET # BLD AUTO: 268 THOUSANDS/UL (ref 149–390)
PMV BLD AUTO: 9.2 FL (ref 8.9–12.7)
POTASSIUM SERPL-SCNC: 4.1 MMOL/L (ref 3.5–5.3)
PROT SERPL-MCNC: 7 G/DL (ref 6.4–8.2)
RBC # BLD AUTO: 4.6 MILLION/UL (ref 3.81–5.12)
SODIUM SERPL-SCNC: 141 MMOL/L (ref 136–145)
WBC # BLD AUTO: 9.43 THOUSAND/UL (ref 4.31–10.16)

## 2021-04-23 PROCEDURE — 80053 COMPREHEN METABOLIC PANEL: CPT

## 2021-04-23 PROCEDURE — 85027 COMPLETE CBC AUTOMATED: CPT

## 2021-04-23 PROCEDURE — 36415 COLL VENOUS BLD VENIPUNCTURE: CPT

## 2021-04-25 NOTE — PROGRESS NOTES
PT Evaluation     Today's date: 2021  Patient name: Irena Park  : 1970  MRN: [id number]  Referring provider: Loi Paredes  Dx:   Encounter Diagnosis     ICD-10-CM    1  Tear of lateral cartilage or meniscus of knee, current, left, subsequent encounter  S83 282D    2  Muscle weakness of lower extremity  M62 81    3  Left knee pain, unspecified chronicity  M25 562                   Assessment  Assessment details: Irena Park is a 54 y o  female with a history of celiac disease, crohn's disease, Hx DVT, migraines, Hx neck pain, PCOS, TMJ syndrome, and BMI> 30 that presents for a moderate complexity pre operative physical therapy initial evaluation  The patient demonstrates signs and symptoms consistent with left knee lateral meniscus tear (upcoming surgery  During the examination the patient demonstrated decreased L LE strength, decreased L knee ROM, gait dysfunction, and L knee pain  The patient's impairments are causing the following functional limitations: difficulty with prolonged standing, prolonged walking, walking on unlevel surfaces, difficulty squatting/kneeling, difficulty stair-climbing, and difficulty transferring from low surfaces  The patient's clinical presentation is evolving due to a number of participation restrictions, significant medial history, and functional limitation  The patient will benefit from skilled PT services to address impairments, work towards goals, and restore PLOF           Impairments: abnormal gait, abnormal or restricted ROM, activity intolerance, impaired balance, impaired physical strength, lacks appropriate home exercise program, pain with function and weight-bearing intolerance  Functional limitations: difficulty with prolonged standing, prolonged walking, walking on unlevel surfaces, difficulty squatting/kneeling, difficulty stair-climbing, and difficulty transferring from low surfacesUnderstanding of Dx/Px/POC: good   Prognosis: good    Goals    GOALS TO BE ACHIEVED BY THE END OF THE PRE-OP VISIT  1  Patient have all questions answered / receive pre operative education regarding precautions/wound care//ADL's  MET   2  Patient to be independent with his phase 1 post operative exercises to perform pre surgery  MET       GOALS TO BE UPDATED/ADJUSTED FOR PATIENT TO ACHIEVE POST OPERATIVELY   STG: Achieve in 4-6 weeks  1  Patient's L knee pain at worst less than 3/10 to allow for proper gait  2   Patient's L knee ROM improve by 20-45 degrees to improve squatting  3   LE MMT improve to > 4+/5 all motions tested to improve ADL/recreational activities  4   Timed up and go score improve to less than 14 seconds to indicate improved balance/decreased falls risk    LTG:  Achieve in 6-12 weeks  1  Patient's knee FOTO score improve to > 65% to indicate a return to normal functioning  2   Patient achieve personal goal of ambulating up/down the steps normally and toilet transfers without difficulty L knee  3  Patient to achieve independence with home exercise plan  Plan  Plan details: RE-ASSESS post operatively ON 5/7/2021 and then 1X/MONTH  Patient would benefit from: skilled physical therapy  Planned modality interventions: TENS, cryotherapy and thermotherapy: hydrocollator packs  Other planned modality interventions: IASTM  Planned therapy interventions: joint mobilization, manual therapy, massage, neuromuscular re-education, patient education, self care, strengthening, stretching, therapeutic activities, therapeutic exercise, home exercise program, abdominal trunk stabilization, balance, body mechanics training, balance/weight bearing training and gait training  Frequency: 1-3x/wk    Duration in weeks: 12  Plan of Care beginning date: 4/26/2021  Plan of Care expiration date: 7/25/2021  Treatment plan discussed with: PTA and patient        Subjective Evaluation    History of Present Illness  Date of onset: 9/9/2020  Mechanism of injury: Toya Kelley is a 54 y o  female that presents to outpatient physical therapy with complaints of left knee pain and instability walking  The patient reports feeling increased pain after turning while walking at home as onset of pain  The patient has attempted conservative measures which have failed  The patient presents for a Pre-Op visit to learn exercises, ask questions about the upcoming surgery 2021 and learn movement precautions/ambulatory techniques w/ a device  The patient's main goal for physical therapy is to learn her Pre op exercises and have all questions answered  Pain  Current pain ratin  At best pain ratin  At worst pain rating: 10  Location: L lateral knee; L medial patella  Quality: burning, dull ache and knife-like  Aggravating factors: standing, walking, stair climbing and running  Progression: worsening    Social Support  Steps to enter house: yes  Stairs in house: no   Lives in: [facility]  Lives with: spouse and adult children    Employment status: not working  Hand dominance: right    Treatments  Current treatment: physical therapy  Patient Goals  Patient goals for therapy: decreased edema, decreased pain, improved balance, increased motion, increased strength, independence with ADLs/IADLs and return to sport/leisure activities  Patient goal: ambulate on steps and toilet transfer without difficulty        Objective     Observations     Additional Observation Details  The patient was educated on caring for L knee incisions and watching for signs of infection to prepare for upcoming L knee scope  surgery  All patient questions related to the incision care and infection signs were answered at this time  We also reviewed ambulation/transfers with an assistive device, post operative movement contraindications/precautions, and ADL's    The patient accepted and understood all information - patient was advised to call if any other questions arise pre-surgery  Palpation   Left   No palpable tenderness to the distal semimembranosus, distal semitendinosus, lateral gastrocnemius and medial gastrocnemius  Tenderness of the rectus femoris, vastus lateralis and vastus medialis  Tenderness   Left Knee   Tenderness in the lateral joint line, medial joint line and patellar tendon  Neurological Testing     Sensation     Knee   Left Knee   Intact: light touch    Right Knee   Intact: light touch     Active Range of Motion   Left Knee   Flexion: 90 degrees   Extension: 0 degrees     Right Knee   Flexion: 130 degrees   Extension: 5 degrees     Mobility   Patellar Mobility:   Left Knee   Hypomobile: left medial, left lateral, left superior and left inferior    Right Knee   WFL: medial, lateral, superior and inferior    Strength/Myotome Testing     Left Hip   Planes of Motion   Flexion: 3+  Extension: 3+  Abduction: 3+  Adduction: 3+    Right Hip   Planes of Motion   Flexion: 5  Extension: 5  Abduction: 5  Adduction: 5    Left Knee   Flexion: 3+  Extension: 3  Quadriceps contraction: fair    Right Knee   Flexion: 5  Extension: 5  Quadriceps contraction: good    Tests     Additional Tests Details  TUG: 15 seconds no AD    Gait: Gait impairments: Patient ambulates with no AD WBAT L LE  The patient demonstrates slow gait speed, unequal step lengths, and decreased heel-toe rollover   (+) Antalgic gait pattern    KOOS: Symptoms: 71 4  Pain: 77 8  Function: 89 7  Recreation: 66 7  QOL: 100    Swelling     Left Knee Girth Measurement (cm)   Joint line: 46 cm    Right Knee Girth Measurement (cm)   Joint line: 42 cm             Precautions: N/A    Re-Evaluation:POST OP    Knee Specialty Daily Treatment Diary     Manual Therapy 4/26       MFR/STM/ IASTM        Hamstring stretch        Prone Quad stretch        Patellar mobs        Knee stretch on edge of mat            Ther Ex 4/26       Nu Step S=        Biodex Bike S=        Heel slides flex/abd X10 EA       PROM knee EXT BOARD        Prone knee flex        ANKLE PUMPS X10       SLR all planes        SAQ        LAQ X10       Hamstring stretch        Calf stretch        Standing hamstring curls                Mini Squat                TKE        Total gym squats (deep)        Neuro Re-ed        SLB        QS X10       Wall slides with feet stagger        GLUTEAL SETS X10       Fitter board        Eccentric Leg press        Clam Shells        QS+ SLR        [place]        GAIT Training        sidestepping        Heel-toe amb        Mirror walking        Ther Act        Amb up/down steps        Chair squats        Crate carry        Step ups            Modalities        CP  KNEE                                The patient was given a new home exercise plan with written handout, pictures, and verbal instruction  The patient accepts and understands the new home activities

## 2021-04-26 ENCOUNTER — EVALUATION (OUTPATIENT)
Dept: PHYSICAL THERAPY | Facility: CLINIC | Age: 51
End: 2021-04-26
Payer: COMMERCIAL

## 2021-04-26 DIAGNOSIS — S83.282D TEAR OF LATERAL CARTILAGE OR MENISCUS OF KNEE, CURRENT, LEFT, SUBSEQUENT ENCOUNTER: Primary | ICD-10-CM

## 2021-04-26 DIAGNOSIS — M25.562 LEFT KNEE PAIN, UNSPECIFIED CHRONICITY: ICD-10-CM

## 2021-04-26 DIAGNOSIS — M62.81 MUSCLE WEAKNESS OF LOWER EXTREMITY: ICD-10-CM

## 2021-04-26 PROCEDURE — 97112 NEUROMUSCULAR REEDUCATION: CPT | Performed by: PHYSICAL THERAPIST

## 2021-04-26 PROCEDURE — 97110 THERAPEUTIC EXERCISES: CPT | Performed by: PHYSICAL THERAPIST

## 2021-04-26 PROCEDURE — 97162 PT EVAL MOD COMPLEX 30 MIN: CPT | Performed by: PHYSICAL THERAPIST

## 2021-04-28 ENCOUNTER — APPOINTMENT (OUTPATIENT)
Dept: PREADMISSION TESTING | Facility: HOSPITAL | Age: 51
End: 2021-04-28
Payer: COMMERCIAL

## 2021-04-28 NOTE — PRE-PROCEDURE INSTRUCTIONS
Pre-Surgery Instructions:   Medication Instructions    apixaban (Eliquis) 5 mg- last dose 4/27/21 (hold until after procedure) Patient was instructed by Physician and understands   cholecalciferol (VITAMIN D3) 1,000 units tablet- 4/28 last dose, hold until after procedure Instructed patient per Anesthesia Guidelines   Cyanocobalamin (VITAMIN B 12 PO)- 4/28 last dose, hold until after procedure Instructed patient per Anesthesia Guidelines   ferrous sulfate (IRON SUPPLEMENT) 325 (65 Fe) mg tablet- 4/28 last dose, hold until after procedure Instructed patient per Anesthesia Guidelines   levothyroxine 100 mcg tablet-   ok to take DOP w/ sip of water Instructed patient per Anesthesia Guidelines   pramipexole (MIRAPEX) 0 5 mg tablet- ok to take DOP w/ sip of water Instructed patient per Anesthesia Guidelines   topiramate (TOPAMAX) 25 mg sprinkle capsule- ok to take DOP w/ sip of water Instructed patient per Anesthesia Guidelines   topiramate (TOPAMAX) 50 MG tablet- ok to take DOP w/ sip of water Instructed patient per Anesthesia Guidelines   Ustekinumab (STELARA IV)- last dose 3/17, every 8 weeks Instructed patient per Anesthesia Guidelines  Pt verbalizes understanding of med instructions  Has CHG & understands bathing instructions

## 2021-05-03 PROCEDURE — NC001 PR NO CHARGE: Performed by: ORTHOPAEDIC SURGERY

## 2021-05-03 NOTE — H&P
H&P Exam - Orthopedics   Ramona Mccabe 54 y o  female MRN: [id number]  Unit/Bed#:  Encounter: [id number]    Assessment/Plan     Assessment:    Lateral meniscal tear of the left knee  Plan:   elective left knee arthroscopy    History of Present Illness   HPI:  Ramona Mccabe is a 54 y o  female who presented to our office complaining of left knee pain  An MRI was performed which consistent with a lateral meniscal tear of her left knee  She was initially referred to physical therapy  She states that therapy did give her some relief, however, her symptoms are not completely resolved  After exhausting conservative treatment, the risks and benefits of surgery discussed with the patient  She decided on an elective left knee arthroscopy  Review of Systems   Constitutional: Negative for chills, fever and unexpected weight change  HENT: Negative for nosebleeds and sore throat  Eyes: Negative for pain, redness and visual disturbance  Respiratory: Negative for cough, shortness of breath and wheezing  Cardiovascular: Negative for chest pain, palpitations and leg swelling  Gastrointestinal: Negative for abdominal pain, nausea and vomiting  Endocrine: Negative for polydipsia and polyuria  Genitourinary: Negative for dysuria and hematuria  Musculoskeletal: Positive for arthralgias, gait problem and joint swelling  As noted in HPI   Skin: Negative for rash and wound  Neurological: Negative for dizziness, numbness and headaches  Psychiatric/Behavioral: Negative for decreased concentration and suicidal ideas  The patient is not nervous/anxious          Historical Information   Past Medical History:   Diagnosis Date    Bulging of cervical intervertebral disc     Celiac disease     Crohn disease ([facility] [address] )     Disease of thyroid gland     [facility]deep venous thrombosis) ([facility])     Migraines     Neck pain, chronic     Polycystic ovarian syndrome     PONV (postoperative nausea and vomiting)     TMJ (temporomandibular joint syndrome)     Wears glasses      Past Surgical History:   Procedure Laterality Date    ADENOIDECTOMY      ANKLE SURGERY Left     reconstruction     SECTION      x2    DIAGNOSTIC LAPAROSCOPY      HYSTERECTOMY      MO ARTHRODESIS ANT INTERBODY INC DISCECTOMY, CERVICAL BELOW C2 Bilateral 2019    Procedure: C5/6 ANTERIOR CERVICAL DECOMPRESSION AND FUSION;  Surgeon: Dai Green MD;  Location: [facility];  Service: Neurosurgery    TONSILLECTOMY      TUBAL LIGATION       Social History   Social History     Substance and Sexual Activity   Alcohol Use Never    Frequency: Never     Social History     Substance and Sexual Activity   Drug Use Never     Social History     Tobacco Use   Smoking Status Current Every Day Smoker    Packs/day: 1 00    Types: Cigarettes   Smokeless Tobacco Never Used   Tobacco Comment    she's "working on it"     Family History: non-contributory    Meds/Allergies   all medications and allergies reviewed  Allergies   Allergen Reactions    Amoxicillin Rash    Codeine Rash and Other (See Comments)     Rash    Diclofenac Rash    Penicillins Other (See Comments)     Rash    Sulfa Antibiotics Other (See Comments) and Hives     Rash  Other reaction(s): Other (See Comments)  Rash    Diclofenac Sodium Rash    Etodolac Hives, Rash and Other (See Comments)     Rash         Objective   Vitals: Height 5' 10" (1 778 m), weight 122 kg (268 lb), not currently breastfeeding  ,Body mass index is 38 45 kg/m²  No intake or output data in the 24 hours ending 21 1128    No intake/output data recorded      Invasive Devices     None                 Physical Exam  Ortho Exam     Left lower extremity is neurovascularly intact  Toes are pink and mobile   Compartments are soft   Positive Josue's   Range of motion of the knee is from 0-120 degrees   Brisk cap refill   Sensation intact   Medial joint line tenderness and lateral joint line tenderness Patellofemoral crepitation   Lungs CTA  Heart RRR  Lab Results: I have personally reviewed pertinent lab results  Imaging: I have personally reviewed pertinent reports  EKG, Pathology, and Other Studies: I have personally reviewed pertinent reports  Code Status: Prior  Advance Directive and Living Will:      Power of :    POLST:      Counseling / Coordination of Care  Total floor / unit time spent today 30 minutes  Greater than 50% of total time was spent with the patient and / or family counseling and / or coordination of care

## 2021-05-05 ENCOUNTER — ANESTHESIA (OUTPATIENT)
Dept: PERIOP | Facility: HOSPITAL | Age: 51
End: 2021-05-05
Payer: COMMERCIAL

## 2021-05-05 ENCOUNTER — ANESTHESIA EVENT (OUTPATIENT)
Dept: PERIOP | Facility: HOSPITAL | Age: 51
End: 2021-05-05
Payer: COMMERCIAL

## 2021-05-05 ENCOUNTER — HOSPITAL ENCOUNTER (OUTPATIENT)
Facility: HOSPITAL | Age: 51
Setting detail: OUTPATIENT SURGERY
Discharge: HOME | End: 2021-05-05
Attending: ORTHOPAEDIC SURGERY | Admitting: ORTHOPAEDIC SURGERY
Payer: COMMERCIAL

## 2021-05-05 VITALS
SYSTOLIC BLOOD PRESSURE: 154 MMHG | BODY MASS INDEX: 38.37 KG/M2 | HEART RATE: 54 BPM | OXYGEN SATURATION: 98 % | HEIGHT: 70 IN | RESPIRATION RATE: 15 BRPM | TEMPERATURE: 97 F | DIASTOLIC BLOOD PRESSURE: 69 MMHG | WEIGHT: 268 LBS

## 2021-05-05 DIAGNOSIS — S83.282D OTHER TEAR OF LATERAL MENISCUS OF LEFT KNEE AS CURRENT INJURY, SUBSEQUENT ENCOUNTER: Primary | ICD-10-CM

## 2021-05-05 PROCEDURE — 88304 TISSUE EXAM BY PATHOLOGIST: CPT | Performed by: PATHOLOGY

## 2021-05-05 PROCEDURE — 29880 ARTHRS KNE SRG MNISECTMY M&L: CPT | Performed by: PHYSICIAN ASSISTANT

## 2021-05-05 PROCEDURE — 88311 DECALCIFY TISSUE: CPT | Performed by: PATHOLOGY

## 2021-05-05 PROCEDURE — 29880 ARTHRS KNE SRG MNISECTMY M&L: CPT | Performed by: ORTHOPAEDIC SURGERY

## 2021-05-05 RX ORDER — CEFAZOLIN SODIUM 2 G/50ML
2000 SOLUTION INTRAVENOUS ONCE
Status: COMPLETED | OUTPATIENT
Start: 2021-05-05 | End: 2021-05-05

## 2021-05-05 RX ORDER — HYDROCODONE BITARTRATE AND ACETAMINOPHEN 5; 325 MG/1; MG/1
1 TABLET ORAL EVERY 6 HOURS PRN
Status: DISCONTINUED | OUTPATIENT
Start: 2021-05-05 | End: 2021-05-05 | Stop reason: HOSPADM

## 2021-05-05 RX ORDER — METHYLPREDNISOLONE ACETATE 40 MG/ML
INJECTION, SUSPENSION INTRA-ARTICULAR; INTRALESIONAL; INTRAMUSCULAR; SOFT TISSUE AS NEEDED
Status: DISCONTINUED | OUTPATIENT
Start: 2021-05-05 | End: 2021-05-05 | Stop reason: HOSPADM

## 2021-05-05 RX ORDER — HYDROMORPHONE HCL/PF 1 MG/ML
0.5 SYRINGE (ML) INJECTION
Status: DISCONTINUED | OUTPATIENT
Start: 2021-05-05 | End: 2021-05-05 | Stop reason: HOSPADM

## 2021-05-05 RX ORDER — FENTANYL CITRATE/PF 50 MCG/ML
50 SYRINGE (ML) INJECTION
Status: DISCONTINUED | OUTPATIENT
Start: 2021-05-05 | End: 2021-05-05 | Stop reason: HOSPADM

## 2021-05-05 RX ORDER — CHLORHEXIDINE GLUCONATE 4 G/100ML
SOLUTION TOPICAL DAILY PRN
Status: DISCONTINUED | OUTPATIENT
Start: 2021-05-05 | End: 2021-05-05 | Stop reason: HOSPADM

## 2021-05-05 RX ORDER — SODIUM CHLORIDE, SODIUM LACTATE, POTASSIUM CHLORIDE, CALCIUM CHLORIDE 600; 310; 30; 20 MG/100ML; MG/100ML; MG/100ML; MG/100ML
125 INJECTION, SOLUTION INTRAVENOUS CONTINUOUS
Status: DISCONTINUED | OUTPATIENT
Start: 2021-05-05 | End: 2021-05-05 | Stop reason: HOSPADM

## 2021-05-05 RX ORDER — LIDOCAINE HYDROCHLORIDE 10 MG/ML
INJECTION, SOLUTION EPIDURAL; INFILTRATION; INTRACAUDAL; PERINEURAL AS NEEDED
Status: DISCONTINUED | OUTPATIENT
Start: 2021-05-05 | End: 2021-05-05

## 2021-05-05 RX ORDER — ONDANSETRON 2 MG/ML
INJECTION INTRAMUSCULAR; INTRAVENOUS AS NEEDED
Status: DISCONTINUED | OUTPATIENT
Start: 2021-05-05 | End: 2021-05-05

## 2021-05-05 RX ORDER — PROPOFOL 10 MG/ML
INJECTION, EMULSION INTRAVENOUS AS NEEDED
Status: DISCONTINUED | OUTPATIENT
Start: 2021-05-05 | End: 2021-05-05

## 2021-05-05 RX ORDER — POVIDONE-IODINE 10 MG/G
OINTMENT TOPICAL AS NEEDED
Status: DISCONTINUED | OUTPATIENT
Start: 2021-05-05 | End: 2021-05-05 | Stop reason: HOSPADM

## 2021-05-05 RX ORDER — SCOLOPAMINE TRANSDERMAL SYSTEM 1 MG/1
PATCH, EXTENDED RELEASE TRANSDERMAL AS NEEDED
Status: DISCONTINUED | OUTPATIENT
Start: 2021-05-05 | End: 2021-05-05

## 2021-05-05 RX ORDER — OXYCODONE HYDROCHLORIDE AND ACETAMINOPHEN 5; 325 MG/1; MG/1
1 TABLET ORAL ONCE AS NEEDED
Status: DISCONTINUED | OUTPATIENT
Start: 2021-05-05 | End: 2021-05-05 | Stop reason: HOSPADM

## 2021-05-05 RX ORDER — CHLORHEXIDINE GLUCONATE 0.12 MG/ML
15 RINSE ORAL ONCE
Status: COMPLETED | OUTPATIENT
Start: 2021-05-05 | End: 2021-05-05

## 2021-05-05 RX ORDER — METOCLOPRAMIDE HYDROCHLORIDE 5 MG/ML
10 INJECTION INTRAMUSCULAR; INTRAVENOUS ONCE AS NEEDED
Status: DISCONTINUED | OUTPATIENT
Start: 2021-05-05 | End: 2021-05-05 | Stop reason: HOSPADM

## 2021-05-05 RX ORDER — BUPIVACAINE HYDROCHLORIDE AND EPINEPHRINE 5; 5 MG/ML; UG/ML
INJECTION, SOLUTION PERINEURAL AS NEEDED
Status: DISCONTINUED | OUTPATIENT
Start: 2021-05-05 | End: 2021-05-05 | Stop reason: HOSPADM

## 2021-05-05 RX ORDER — HYDROCODONE BITARTRATE AND ACETAMINOPHEN 5; 325 MG/1; MG/1
1 TABLET ORAL EVERY 6 HOURS PRN
Qty: 30 TABLET | Refills: 0 | Status: SHIPPED | OUTPATIENT
Start: 2021-05-05 | End: 2021-05-15

## 2021-05-05 RX ORDER — DEXAMETHASONE SODIUM PHOSPHATE 10 MG/ML
INJECTION, SOLUTION INTRAMUSCULAR; INTRAVENOUS AS NEEDED
Status: DISCONTINUED | OUTPATIENT
Start: 2021-05-05 | End: 2021-05-05

## 2021-05-05 RX ORDER — PROPOFOL 10 MG/ML
INJECTION, EMULSION INTRAVENOUS CONTINUOUS PRN
Status: DISCONTINUED | OUTPATIENT
Start: 2021-05-05 | End: 2021-05-05

## 2021-05-05 RX ORDER — ONDANSETRON 2 MG/ML
4 INJECTION INTRAMUSCULAR; INTRAVENOUS EVERY 6 HOURS PRN
Status: DISCONTINUED | OUTPATIENT
Start: 2021-05-05 | End: 2021-05-05 | Stop reason: HOSPADM

## 2021-05-05 RX ORDER — HYDROMORPHONE HCL/PF 1 MG/ML
0.5 SYRINGE (ML) INJECTION
Status: DISCONTINUED | OUTPATIENT
Start: 2021-05-05 | End: 2021-05-05

## 2021-05-05 RX ORDER — KETOROLAC TROMETHAMINE 30 MG/ML
30 INJECTION, SOLUTION INTRAMUSCULAR; INTRAVENOUS ONCE
Status: DISCONTINUED | OUTPATIENT
Start: 2021-05-05 | End: 2021-05-05

## 2021-05-05 RX ORDER — MIDAZOLAM HYDROCHLORIDE 2 MG/2ML
INJECTION, SOLUTION INTRAMUSCULAR; INTRAVENOUS AS NEEDED
Status: DISCONTINUED | OUTPATIENT
Start: 2021-05-05 | End: 2021-05-05

## 2021-05-05 RX ORDER — MAGNESIUM HYDROXIDE 1200 MG/15ML
LIQUID ORAL AS NEEDED
Status: DISCONTINUED | OUTPATIENT
Start: 2021-05-05 | End: 2021-05-05 | Stop reason: HOSPADM

## 2021-05-05 RX ORDER — CEPHALEXIN 500 MG/1
500 CAPSULE ORAL EVERY 8 HOURS SCHEDULED
Qty: 15 CAPSULE | Refills: 0 | Status: SHIPPED | OUTPATIENT
Start: 2021-05-05 | End: 2021-05-10

## 2021-05-05 RX ORDER — PROMETHAZINE HYDROCHLORIDE 25 MG/ML
25 INJECTION, SOLUTION INTRAMUSCULAR; INTRAVENOUS ONCE AS NEEDED
Status: DISCONTINUED | OUTPATIENT
Start: 2021-05-05 | End: 2021-05-05 | Stop reason: HOSPADM

## 2021-05-05 RX ORDER — FENTANYL CITRATE 50 UG/ML
INJECTION, SOLUTION INTRAMUSCULAR; INTRAVENOUS AS NEEDED
Status: DISCONTINUED | OUTPATIENT
Start: 2021-05-05 | End: 2021-05-05

## 2021-05-05 RX ADMIN — MIDAZOLAM HYDROCHLORIDE 2 MG: 1 INJECTION, SOLUTION INTRAMUSCULAR; INTRAVENOUS at 13:48

## 2021-05-05 RX ADMIN — SODIUM CHLORIDE, SODIUM LACTATE, POTASSIUM CHLORIDE, AND CALCIUM CHLORIDE 125 ML/HR: .6; .31; .03; .02 INJECTION, SOLUTION INTRAVENOUS at 12:40

## 2021-05-05 RX ADMIN — ONDANSETRON 4 MG: 2 INJECTION INTRAMUSCULAR; INTRAVENOUS at 14:43

## 2021-05-05 RX ADMIN — FENTANYL CITRATE 25 MCG: 50 INJECTION INTRAMUSCULAR; INTRAVENOUS at 13:50

## 2021-05-05 RX ADMIN — DEXAMETHASONE SODIUM PHOSPHATE 4 MG: 10 INJECTION, SOLUTION INTRAMUSCULAR; INTRAVENOUS at 14:18

## 2021-05-05 RX ADMIN — CHLORHEXIDINE GLUCONATE 0.12% ORAL RINSE 15 ML: 1.2 LIQUID ORAL at 12:41

## 2021-05-05 RX ADMIN — ONDANSETRON 4 MG: 2 INJECTION INTRAMUSCULAR; INTRAVENOUS at 14:23

## 2021-05-05 RX ADMIN — HYDROMORPHONE HYDROCHLORIDE 0.5 MG: 1 INJECTION, SOLUTION INTRAMUSCULAR; INTRAVENOUS; SUBCUTANEOUS at 15:06

## 2021-05-05 RX ADMIN — PROPOFOL 160 MCG/KG/MIN: 10 INJECTION, EMULSION INTRAVENOUS at 13:54

## 2021-05-05 RX ADMIN — PROPOFOL 200 MG: 10 INJECTION, EMULSION INTRAVENOUS at 13:50

## 2021-05-05 RX ADMIN — FENTANYL CITRATE 50 MCG: 50 INJECTION INTRAMUSCULAR; INTRAVENOUS at 13:54

## 2021-05-05 RX ADMIN — HYDROMORPHONE HYDROCHLORIDE 0.5 MG: 1 INJECTION, SOLUTION INTRAMUSCULAR; INTRAVENOUS; SUBCUTANEOUS at 14:53

## 2021-05-05 RX ADMIN — HYDROMORPHONE HYDROCHLORIDE 0.5 MG: 1 INJECTION, SOLUTION INTRAMUSCULAR; INTRAVENOUS; SUBCUTANEOUS at 15:24

## 2021-05-05 RX ADMIN — LIDOCAINE HYDROCHLORIDE 50 MG: 10 INJECTION, SOLUTION EPIDURAL; INFILTRATION; INTRACAUDAL; PERINEURAL at 13:50

## 2021-05-05 RX ADMIN — HYDROCODONE BITARTRATE AND ACETAMINOPHEN 1 TABLET: 5; 325 TABLET ORAL at 15:37

## 2021-05-05 RX ADMIN — CEFAZOLIN SODIUM 2000 MG: 2 SOLUTION INTRAVENOUS at 13:46

## 2021-05-05 RX ADMIN — FENTANYL CITRATE 25 MCG: 50 INJECTION INTRAMUSCULAR; INTRAVENOUS at 14:01

## 2021-05-05 RX ADMIN — SCOPALAMINE 1 PATCH: 1 PATCH, EXTENDED RELEASE TRANSDERMAL at 14:16

## 2021-05-05 NOTE — INTERVAL H&P NOTE
H&P reviewed  After examining the patient I find no changes in the patients condition since the H&P had been written      Vitals:    05/05/21 1228   BP: 115/59   Pulse: 63   Resp: 18   Temp: 98 4 °F (36 9 °C)   SpO2: 100%

## 2021-05-05 NOTE — OP NOTE
OPERATIVE REPORT  PATIENT NAME: Dio Stark    :  1970  MRN: [id number]  Pt Location: [facility] OR ROOM 02    SURGERY DATE: 2021    Surgeon(s) and Role:     * Damian Maldonado DO - Primary    Assistant:  Karl Hernandez PA-C    Preop Diagnosis:  Other tear of lateral meniscus of left knee as current injury, subsequent encounter [S83 282D]    Post-Op Diagnosis Codes:     * Other tear of lateral meniscus of left knee as current injury, subsequent encounter [S83 282D]  Tear of medial meniscus  Degenerative joint disease  Synovitis    Procedure(s) (LRB):  KNEE ARTHROSCOPY (Left)   Meniscectomy  Lateral meniscectomy  Chondroplasty  Synovectomy  Post arthroscopic injection of intra-articular joint space and peripheral portals    Specimen(s):  Shavings    Estimated Blood Loss:   Minimal    Drains:  None    Anesthesia Type:   LMA    Operative Indications: Other tear of lateral meniscus of left knee as current injury, subsequent encounter [S83 282D]      Operative Findings:  TT: 14    Complications:   None    Procedure and Technique:    The patient was properly identified and brought into the  operative suite  After  Successful induction of the general anesthetic, a tourniquet was placed on patient's left proximal thigh  The left lower extremity was then prepped and draped in the usual fashion  It was medically necessary that the physician's assistant be in the room to aid in positioning and placing  the appropriate amount of retraction on  the patient  Karl Hernandez PA-C-assisting necessary for the procedure for assistance with minimally invasive arthroscopic techniques for medial and lateral meniscectomies as well as assistance with utilization of the camera and shaver and the biter  The patient was also given a dose of intravenous antibiotics      An Esmarch was used to exsanguinate the left lower extremity  The tourniquet was then inflated    Using a #11  Scalpel blade an anterior lateral portal was made approximately 1 cm above the joint line 1 cm lateral to patellar tendon and an anterior medial portal was made approximately 1 cm above the joint line 1 cm medial to patellar tendon  The arthroscope was 1st introduced into the  into the lateral portal   First the  medial joint space was inspected  There was a tremendous amount of synovial tissue  A synovectomy did occur with these with the assistance of a shaver  There was a complex tear along the central and posterior 1/3 horn of the medial meniscus  There was grade 3 arthritic changes along the medial femoral condyle  Using an an arthroscopic biter, a medial meniscectomy  occurred  It was then smoothed out with shaver  A Chondroplasty did occur where there was rough articular cartilage  It was then confirmed with a probe that there was no further loosening of the meniscus and articular cartilage  The arthroscope was then introduced into the intercondylar notch  The ACL was probed and found be quite stable     The arthroscope was then introduced in the lateral side of her knee  After a synovectomy was performed, there was a complex tear along the lateral meniscus  Using arthroscopic shaver a lateral meniscectomy did occur  It was then confirmed with a probe that there is no further loosening of the meniscal tissue  There was grade 3 arthrosis on the lateral side of her knee where  a  chondroplasty was warranted  it was confirmed with a probe that there is no further loosening of the meniscal tissue    The arthroscope was then induced the patellofemoral joint  There was some grade 2 and grade 3 early arthritic changes along the undersurface of the patella and trochlea  Using arthroscopic shaver a chondroplasty to occur smoothing the articular cartilage down to a nice base  The medial and lateral gutters were inspected next and there was no loose bodies  The knee was then copiously irrigated sterile arthroscopic solution    The portals were closed with 3 O nylon  The portals were then injected with 0 5% Marcaine with epinephrine, and the knee was injected with 0 5% Marcaine with epinephrine and Depo-Medrol  The wounds were then dressed with ointment Xeroform 4x4s sterile Webril and an Ace bandage  The tourniquet was then deflated  There was no complications during the procedure  She was successfully awoken,  transferred to her hospital bed, and went to recovery room in stable condition          I was present for the entire procedure, A qualified resident physician was not available and A physician assistant was required during the procedure for retraction tissue handling,dissection and suturing    Patient Disposition:  PACU     SIGNATURE: Alaina Mendes DO  DATE: May 5, 2021  TIME: 1:43 PM

## 2021-05-05 NOTE — ANESTHESIA PREPROCEDURE EVALUATION
Procedure:  KNEE ARTHROSCOPY (Left Knee)    Relevant Problems   CARDIO   (+) Acute deep vein thrombosis (DVT) of left peroneal vein (HCC)      NEURO/PSYCH   (+) History of meniscal tear        Physical Exam    Airway    Mallampati score: III  TM Distance: >3 FB  Neck ROM: full     Dental   No notable dental hx     Cardiovascular      Pulmonary      Other Findings        Anesthesia Plan  ASA Score- 3     Anesthesia Type- general with ASA Monitors  Additional Monitors:   Airway Plan: LMA  Plan Factors-Exercise tolerance (METS): >4 METS  Chart reviewed  Patient summary reviewed  Patient is a current smoker  Patient instructed to abstain from smoking on day of procedure  Patient smoked on day of surgery  There is medical exclusion for perioperative obstructive sleep apnea risk education  Induction- intravenous  Postoperative Plan- Plan for postoperative opioid use  Informed Consent- Anesthetic plan and risks discussed with patient  I personally reviewed this patient with the CRNA  Discussed and agreed on the Anesthesia Plan with the CRNA  Bonny Herr

## 2021-05-05 NOTE — PROGRESS NOTES
PT Re-Evaluation     Today's date: 2021  Patient name: Klaudia Dow  : 1970  MRN: [id number]  Referring provider: Jose Salcido  Dx:   Encounter Diagnosis     ICD-10-CM    1  Tear of lateral cartilage or meniscus of knee, current, left, subsequent encounter  S83 282D    2  Muscle weakness of lower extremity  M62 81    3  Left knee pain, unspecified chronicity  M25 562                   Assessment  Assessment details: Klaudia Dow is a 54 y o  female with a history of celiac disease, crohn's disease, Hx DVT, migraines, Hx neck pain, PCOS, TMJ syndrome, and BMI> 30 that presents for a physical therapy RE evaluation  The patient demonstrates signs and symptoms consistent with left knee lateral meniscus tear s/p L knee scope  During the examination the patient demonstrated decreased L LE strength, decreased L knee ROM, gait dysfunction, and L knee pain  The patient's impairments are causing the following functional limitations: difficulty with prolonged standing, prolonged walking, walking on unlevel surfaces, difficulty squatting/kneeling, difficulty stair-climbing, and difficulty transferring from low surfaces  The patient will benefit from skilled PT services to address impairments, work towards goals, and restore PLOF         Impairments: abnormal gait, abnormal or restricted ROM, activity intolerance, impaired balance, impaired physical strength, lacks appropriate home exercise program, pain with function and weight-bearing intolerance  Functional limitations: difficulty with prolonged standing, prolonged walking, walking on unlevel surfaces, difficulty squatting/kneeling, difficulty stair-climbing, and difficulty transferring from low surfacesUnderstanding of Dx/Px/POC: good   Prognosis: good    Goals    GOALS TO BE ACHIEVED BY THE END OF THE PRE-OP VISIT  1  Patient have all questions answered / receive pre operative education regarding precautions/wound care//ADL's  MET   2  Patient to be independent with his phase 1 post operative exercises to perform pre surgery  MET       GOALS TO BE UPDATED/ADJUSTED on 5/7/2021  STG: Achieve in 4-6 weeks  1  Patient's L knee pain at worst less than 3/10 to allow for proper gait  PROGRESSING  2  Patient's L knee flexion ROM improve by 20-75 degrees to improve squatting  PROGRESSING  3  LE MMT improve to > 4+/5 all motions tested to improve ADL/recreational activities  PROGRESSING   4  Timed up and go score improve to less than 14 seconds to indicate improved balance/decreased falls risk  PROGRESSING    LTG:  Achieve in 6-12 weeks  1  Patient's knee FOTO score improve to > 60% to indicate a return to normal functioning  PROGRESSING  2  Patient achieve personal goal of ambulating up/down the steps normally and toilet transfers without difficulty L knee  PROGRESSING  3  Patient to achieve independence with home exercise plan  PROGRESSING      Plan  Plan details: RE-ASSESS  1X/MONTH  Patient would benefit from: skilled physical therapy  Planned modality interventions: TENS, cryotherapy and thermotherapy: hydrocollator packs  Other planned modality interventions: IASTM  Planned therapy interventions: joint mobilization, manual therapy, massage, neuromuscular re-education, patient education, self care, strengthening, stretching, therapeutic activities, therapeutic exercise, home exercise program, abdominal trunk stabilization, balance, body mechanics training, balance/weight bearing training and gait training  Frequency: 1-3x/wk  Duration in weeks: 12  Plan of Care beginning date: 4/26/2021  Plan of Care expiration date: 7/25/2021  Treatment plan discussed with: PTA and patient        Subjective Evaluation    History of Present Illness  Date of onset: 9/9/2020  Mechanism of injury: SUBJECTIVE: 5/7/2021:  The patient notes she experienced terrible whole left leg pain the day after surgery    The patient called and spoke with Dr Cayden Perez regarding the pain increase - he felt this was partially from the surgery but also from a Hx of LBP  The patient now feels improved  - now just having local left knee pain  The patient has the most difficulty bending the left knee  She is using the B/L crutches for community distance walking but feels she does not need them in the home  INJURY HISTORY: Carl Zhao is a 54 y o  female that presents to outpatient physical therapy with complaints of left knee pain and instability walking  The patient reports feeling increased pain after turning while walking at home as onset of pain  The patient has attempted conservative measures which have failed  The patient presents for a Pre-Op visit to learn exercises, ask questions about the upcoming surgery 2021 and learn movement precautions/ambulatory techniques w/ a device  The patient's main goal for physical therapy is to learn her Pre op exercises and have all questions answered  Pain  Current pain ratin  At best pain ratin  At worst pain rating: 10  Location: L lateral knee; L inferiorl patella  Quality: dull ache and knife-like  Aggravating factors: standing, walking, stair climbing and running  Progression: no change    Social Support  Steps to enter house: yes  Stairs in house: no   L[facility] in: [facility]  Lives with: spouse and adult children    Employment status: not working  Hand dominance: right    Treatments  Current treatment: physical therapy  Patient Goals  Patient goals for therapy: decreased edema, decreased pain, improved balance, increased motion, increased strength, independence with ADLs/IADLs and return to sport/leisure activities  Patient goal: ambulate on steps and toilet transfer without difficulty        Objective     Observations     Additional Observation Details  Patient's postoperative dressings removed and incisions inspected  The patient was educated on caring for his incisions and watching for signs of infection  All patient questions related to the incisions were answered at this time  The patient's incisions were cleaned with alcohol swabs and redressed with guaze and band aids  Palpation   Left   No palpable tenderness to the distal semimembranosus, distal semitendinosus, lateral gastrocnemius, medial gastrocnemius, rectus femoris, vastus lateralis and vastus medialis  Tenderness   Left Knee   Tenderness in the lateral joint line, medial joint line and patellar tendon  Neurological Testing     Sensation     Knee   Left Knee   Intact: light touch    Right Knee   Intact: light touch     Active Range of Motion   Left Knee   Flexion: 45 degrees   Extension: 0 degrees     Right Knee   Flexion: 130 degrees   Extension: 5 degrees     Mobility   Patellar Mobility:   Left Knee   Hypomobile: left medial, left lateral, left superior and left inferior    Right Knee   WFL: medial, lateral, superior and inferior    Strength/Myotome Testing     Left Hip   Planes of Motion   Flexion: 3-  Extension: 3  Abduction: 3  Adduction: 3    Right Hip   Planes of Motion   Flexion: 5  Extension: 5  Abduction: 5  Adduction: 5    Left Knee   Flexion: 3  Extension: 3  Quadriceps contraction: fair    Right Knee   Flexion: 5  Extension: 5  Quadriceps contraction: good    Tests     Additional Tests Details  TU seconds B/L crutches    Gait: Gait impairments: Patient ambulates with B/L axillary crutches WBAT L LE  The patient demonstrates slow gait speed, unequal step lengths, and decreased heel-toe rollover  (+) Antalgic gait pattern      KOOS: Symptoms: 71 4  Pain: 77 8  Function: 89 7  Recreation: 66 7  QOL: 100    Swelling     Left Knee Girth Measurement (cm)   Joint line: 46 cm    Right Knee Girth Measurement (cm)   Joint line: 42 cm             Precautions: HX DVT    Re-Evaluation:    Knee Specialty Daily Treatment Diary     Manual Therapy       MFR/STM/ IASTM        Hamstring stretch        Prone Quad stretch Patellar mobs        Knee stretch on edge of mat            Ther Ex 4/26 5/7      Nu Step Towel roll S=   *     Biodex Bike S=        Heel slides flex/abd X10 EA x10 ea      PROM knee   *     EXT BOARD        Prone knee flex        ANKLE PUMPS X10 x30      SLR all planes        SAQ   *     LAQ X10       Hamstring stretch  3x:30      Calf stretch  3x:30      Standing hamstring curls                Mini Squat                TKE        Total gym squats (deep)        Neuro Re-ed        SLB        QS X10 x15      Wall slides with feet stagger   *     GLUTEAL SETS X10 reviewed      Posture correction standing/sitting  Done       Fitter board        Tandem stand   *     Eccentric Leg press        Clam Shells        QS+ SLR        [place]        GAIT Training        sidestepping   *     Heel-toe amb        Mirror walking   *     Ther Act        Amb up/down steps        Chair squats        Crate carry        Step ups   2"          Modalities        CP  KNEE                                The patient was given a new home exercise plan with written handout, pictures, and verbal instruction  The patient accepts and understands the new home activities

## 2021-05-05 NOTE — ANESTHESIA POSTPROCEDURE EVALUATION
Post-Op Assessment Note    CV Status:  Stable    Pain management: adequate     Mental Status:  Alert and awake   Hydration Status:  Euvolemic   PONV Controlled:  Inadequately controlled (nurse giving additional zofran)   Airway Patency:  Patent      Post Op Vitals Reviewed: Yes      Staff: CRNA         No complications documented      BP   135/63   Temp      Pulse  62   Resp      SpO2   97

## 2021-05-06 ENCOUNTER — TELEPHONE (OUTPATIENT)
Dept: OTHER | Facility: OTHER | Age: 51
End: 2021-05-06

## 2021-05-06 ENCOUNTER — TELEPHONE (OUTPATIENT)
Dept: OBGYN CLINIC | Facility: HOSPITAL | Age: 51
End: 2021-05-06

## 2021-05-06 NOTE — TELEPHONE ENCOUNTER
I spoke to the patient    She will continue present care as well as add Tylenol for any increased pain

## 2021-05-06 NOTE — TELEPHONE ENCOUNTER
Spoke to patient  She stated she has pain in the whole leg  Stated she is unable to get comfortable and did not sleep well last night  Reports that her pain is a constant ache from her hip to her toes  Patient denies drainage, swelling, discoloration to the leg  Stated she has sensation to her toes  Reports pain in the whole bottom half of the leg, denies excess swelling heat ot touch, or redness to the calf  Stated the calf is slightly pink and has some slight swelling  Patient cannot take NSAIDs  Reports she is taking NORCO 1 tab Q6h and icing 20 mins on/20 mins off  Did instruct patient to loosen the bandage which she did while on the phone with nurse  No improvement  Advised that she can add some extra tylenol  Take 1-650 mg tablet of tylenol BID in addition to the [address]  Encouraged continued ice and elevation  Can we have her try 2 tablets of norco for 2 doses then go back to the regular dose at this time for acute PO pain? Please advise

## 2021-05-06 NOTE — TELEPHONE ENCOUNTER
Had a scrapping of her knee yesterday with Dr Nickerson Credit has been in excruciating pain throughout the night but its her entire leg not just the knee  Has tried pain meds, ice and elevating nothing seems to be working

## 2021-05-07 ENCOUNTER — EVALUATION (OUTPATIENT)
Dept: PHYSICAL THERAPY | Facility: CLINIC | Age: 51
End: 2021-05-07
Payer: COMMERCIAL

## 2021-05-07 DIAGNOSIS — M25.562 LEFT KNEE PAIN, UNSPECIFIED CHRONICITY: ICD-10-CM

## 2021-05-07 DIAGNOSIS — M62.81 MUSCLE WEAKNESS OF LOWER EXTREMITY: ICD-10-CM

## 2021-05-07 DIAGNOSIS — S83.282D TEAR OF LATERAL CARTILAGE OR MENISCUS OF KNEE, CURRENT, LEFT, SUBSEQUENT ENCOUNTER: Primary | ICD-10-CM

## 2021-05-07 PROCEDURE — 97110 THERAPEUTIC EXERCISES: CPT | Performed by: PHYSICAL THERAPIST

## 2021-05-07 PROCEDURE — 97112 NEUROMUSCULAR REEDUCATION: CPT | Performed by: PHYSICAL THERAPIST

## 2021-05-10 ENCOUNTER — OFFICE VISIT (OUTPATIENT)
Dept: PHYSICAL THERAPY | Facility: CLINIC | Age: 51
End: 2021-05-10
Payer: COMMERCIAL

## 2021-05-10 DIAGNOSIS — M25.562 LEFT KNEE PAIN, UNSPECIFIED CHRONICITY: ICD-10-CM

## 2021-05-10 DIAGNOSIS — S83.282D TEAR OF LATERAL CARTILAGE OR MENISCUS OF KNEE, CURRENT, LEFT, SUBSEQUENT ENCOUNTER: Primary | ICD-10-CM

## 2021-05-10 DIAGNOSIS — M62.81 MUSCLE WEAKNESS OF LOWER EXTREMITY: ICD-10-CM

## 2021-05-10 PROCEDURE — 97112 NEUROMUSCULAR REEDUCATION: CPT

## 2021-05-10 PROCEDURE — 97110 THERAPEUTIC EXERCISES: CPT

## 2021-05-10 PROCEDURE — 97116 GAIT TRAINING THERAPY: CPT

## 2021-05-10 NOTE — PROGRESS NOTES
Daily Note     Today's date: 5/10/2021  Patient name: Narayan Munoz  : 1970  MRN: [id number]  Referring provider: Noelle Richards  Dx:   Encounter Diagnosis     ICD-10-CM    1  Tear of lateral cartilage or meniscus of knee, current, left, subsequent encounter  S83 282D    2  Muscle weakness of lower extremity  M62 81    3  Left knee pain, unspecified chronicity  M25 562                   Subjective: Patient states her knee is feeling good today  No pain and she is not taking pain medication  She no longer uses the ace wrap and her swelling is down  She reports her incisions are clean and dry without drainage, sutures intact  She has started going up the stairs at home step over step  Objective: See treatment diary below    Patient's home exercise program updated to include additional exercises  Handout issued and explained with demonstration  Assessment: Tolerated treatment well  Patient would benefit from continued PT for stretching and strengthening  Patient was able to add and increase exercises to her program with little difficulty and discomfort  Patient seemed to understand all education on new exercises  Patient felt her pain stayed the same by the end of the program and she will go home and ice her knee  Plan: Continue per plan of care  Progress treatment as tolerated         Precautions: HX DVT    Re-Evaluation:    Knee Specialty Daily Treatment Diary     Manual Therapy 4/26 5/7 5/10     MFR/STM/ IASTM        Hamstring stretch        Prone Quad stretch        Patellar mobs        Knee stretch on edge of mat            Ther Ex 4/26 5/7 5/10     Nu Step Towel roll S=15   *L1 x5 min     Biodex Bike S=        Heel slides flex/abd X10 EA x10 ea x10 ea     PROM knee   *:30x3 flex only     EXT BOARD        Prone knee flex        ANKLE PUMPS X10 x30 x30     SLR all planes        SAQ   *:05x10     LAQ X10       Hamstring stretch  3x:30 :30x3     Calf stretch  3x:30 Wedge :30x3 Standing hamstring curls                Mini Squat                TKE        Total gym squats (deep)        Neuro Re-ed        SLB        QS X10 x15 2x10     Wall slides with feet stagger   *:15x4 L back     GLUTEAL SETS X10 reviewed      Posture correction standing/sitting  Done       Fitter board        Tandem stand   *:30x2 ea B/L     Eccentric Leg press        Clam Shells        QS+ SLR        [place]        GAIT Training        sidestepping   *15ft x4     Heel-toe amb        Mirror walking   *x3 min     Ther Act        Amb up/down steps        Chair squats        Crate carry        Step ups   4" x10 ea         Modalities        CP  KNEE

## 2021-05-18 ENCOUNTER — APPOINTMENT (OUTPATIENT)
Dept: PHYSICAL THERAPY | Facility: CLINIC | Age: 51
End: 2021-05-18
Payer: COMMERCIAL

## 2021-05-18 ENCOUNTER — OFFICE VISIT (OUTPATIENT)
Dept: OBGYN CLINIC | Facility: CLINIC | Age: 51
End: 2021-05-18

## 2021-05-18 VITALS
DIASTOLIC BLOOD PRESSURE: 78 MMHG | HEART RATE: 76 BPM | SYSTOLIC BLOOD PRESSURE: 117 MMHG | BODY MASS INDEX: 38.37 KG/M2 | HEIGHT: 70 IN | WEIGHT: 268 LBS

## 2021-05-18 DIAGNOSIS — Z98.890 S/P LEFT KNEE ARTHROSCOPY: Primary | ICD-10-CM

## 2021-05-18 DIAGNOSIS — S83.282D OTHER TEAR OF LATERAL MENISCUS OF LEFT KNEE AS CURRENT INJURY, SUBSEQUENT ENCOUNTER: ICD-10-CM

## 2021-05-18 PROCEDURE — 99024 POSTOP FOLLOW-UP VISIT: CPT | Performed by: ORTHOPAEDIC SURGERY

## 2021-05-18 NOTE — PROGRESS NOTES
Assessment/Plan:  Assessment/Plan   Diagnoses and all orders for this visit:    S/P left knee arthroscopy    Other tear of lateral meniscus of left knee as current injury, subsequent encounter      Discussed with patient that she is recuperating well postoperatively  She can continue NSAIDs / Tylenol as needed for pain and soreness  She expressed a desire to return to [place] [address] activities  Educated patient that she should avoid impact activity for an additional 2 weeks, and then progressed slowly using pain as her guide  Discussed with the patient that in addition to her meniscal tear, she also had developing osteoarthritis of the knee and that the presence of arthritis may produce continued soreness of the knee with activity  Patient expressed understanding  She will discontinue physical therapy, as it is a traveling burden to her based on where she currently resides, and will continue with home exercise program   She will be seen for follow-up in 1 month for re-evaluation  Patient is amenable to this treatment plan  The patient is doing quite well from her left knee arthroscopy  Continue home exercise program   Continue stretching  Return back in 1 month for strength and motion check  Subjective:   Patient ID: Klaudia Dow is a 54 y o  female  HPI      Patient presents today for follow-up evaluation 13 days s/p left knee arthroscopy with medial and lateral meniscectomy, chondroplasty, synovectomy, and injection  She states that she is progressing well postoperatively, and she feels like she has her knee back  She reports occasional soreness, but states that she has significantly improved compared to prior to surgery  She denies any recent bruising, swelling, numbness, tingling, or feelings of instability    She also denies any recent fever, chills, headache, nausea, dizziness, or malaise    The following portions of the patient's history were reviewed and updated as appropriate: allergies, current medications, past family history, past medical history, past social history, past surgical history and problem list     Past Medical History:   Diagnosis Date    Bulging of cervical intervertebral disc     Celiac disease     Crohn disease ([address] )     Disease of thyroid gland     DVT (deep venous thrombosis) (HCC)     Migraines     Neck pain, chronic     Polycystic ovarian syndrome     PONV (postoperative nausea and vomiting)     TMJ (temporomandibular joint syndrome)     Wears glasses      Past Surgical History:   Procedure Laterality Date    ADENOIDECTOMY      ANKLE SURGERY Left     reconstruction     SECTION      x2    DIAGNOSTIC LAPAROSCOPY      HYSTERECTOMY      UT ARTHRODESIS ANT INTERBODY INC DISCECTOMY, CERVICAL BELOW C2 Bilateral 2019    Procedure: C5/6 ANTERIOR CERVICAL DECOMPRESSION AND FUSION;  Surgeon: Joanie Mckenna MD;  [facility];  Service: Neurosurgery    UT KNEE SCOPE,MED/LAT MENISECTOMY Left 2021    Procedure: KNEE ARTHROSCOPY, medial and lateral meniscectomy, chondrooplasty, synovectomy, injection;  Surgeon: Jos Boucher DO;  Location: [address] [place] [address] MAIN OR;  Service: Orthopedics    TONSILLECTOMY      TUBAL LIGATION       Family History   Adopted: Yes   Family history unknown: Yes     Social History     Socioeconomic History    Marital status: Legally      Spouse name: None    Number of children: None    Years of education: None    Highest education level: None   Occupational History    None   Social Needs    Financial resource strain: None    Food insecurity     Worry: None     Inability: None    Transportation needs     Medical: None     Non-medical: None   Tobacco Use    Smoking status: Current Every Day Smoker     Packs/day: 1 00     Types: Cigarettes    Smokeless tobacco: Never Used    Tobacco comment: she's "working on it"   Substance and Sexual Activity    Alcohol use: Never     Frequency: Never    Drug use: Never    Sexual activity: None   Lifestyle    Physical activity     Days per week: None     Minutes per session: None    Stress: None   Relationships    Social connections     Talks on phone: None     Gets together: None     Attends [employer] service: None     Active member of club or organization: None     Attends meetings of clubs or organizations: None     Relationship status: None    Intimate partner violence     Fear of current or ex partner: None     Emotionally abused: None     Physically abused: None     Forced sexual activity: None   Other Topics Concern    None   Social History Narrative    None       Current Outpatient Medications:     cholecalciferol (VITAMIN D3) 1,000 units tablet, Take 2,000 Units by mouth daily, Disp: , Rfl:     Cyanocobalamin (VITAMIN B 12 PO), Take 1 tablet by mouth daily , Disp: , Rfl:     ferrous sulfate (IRON SUPPLEMENT) 325 (65 Fe) mg tablet, Take 325 mg by mouth daily with breakfast, Disp: , Rfl:     levothyroxine 100 mcg tablet, Take 100 mcg by mouth daily, Disp: , Rfl:     pramipexole (MIRAPEX) 0 5 mg tablet, Take 0 5 mg by mouth 2 (two) times a day , Disp: , Rfl:     topiramate (TOPAMAX) 50 MG tablet, Take 50 mg by mouth 2 (two) times a day , Disp: , Rfl:     Ustekinumab (STELARA IV), Infuse into a venous catheter every 56 days Every 8 weeks; home injection Last dose 3/17/2021, Disp: , Rfl:     apixaban (Eliquis) 5 mg, Take 2 5 mg by mouth 2 (two) times a day Last dose 4/27, Disp: , Rfl:     topiramate (TOPAMAX) 25 mg sprinkle capsule, Take 25 mg by mouth daily in the early morning, Disp: , Rfl:     Allergies   Allergen Reactions    Amoxicillin Rash    Codeine Rash and Other (See Comments)     Rash    Diclofenac Rash    Penicillins Other (See Comments)     Rash    Sulfa Antibiotics Other (See Comments) and Hives     Rash  Other reaction(s):  Other (See Comments)  Rash    Diclofenac Sodium Rash    Etodolac Hives, Rash and Other (See Comments)     Rash         Review of Systems   Constitutional: Negative for chills, fever and unexpected weight change  HENT: Negative for hearing loss, nosebleeds and sore throat  Eyes: Negative for pain, redness and visual disturbance  Respiratory: Negative for cough, shortness of breath and wheezing  Cardiovascular: Negative for chest pain, palpitations and leg swelling  Gastrointestinal: Negative for abdominal pain, nausea and vomiting  Endocrine: Negative for polydipsia and polyuria  Genitourinary: Negative for dysuria and hematuria  Musculoskeletal:        As noted in HPI   Skin: Negative for rash and wound  Neurological: Negative for dizziness, numbness and headaches  Psychiatric/Behavioral: Negative for decreased concentration and suicidal ideas  The patient is not nervous/anxious  Objective:  /78   Pulse 76   Ht 5' 10" (1 778 m)   Wt 122 kg (268 lb)   BMI 38 45 kg/m²     Ortho Exam    left knee -   Weight-bearing status:WBAT  Incision:  Clean, dry, healing, no signs of drainage or dehiscence  Skin is warm and dry with no signs of erythema, ecchymosis, or infection   mild soft tissue swelling, no effusion noted  ROM: 0°- 125°  Strength:  5/5 seated knee extension and flexion   knee is stable to varus, valgus, anterior, and posterior stress  Calf compartments are soft  2+ TP and DP pulses with brisk capillary refill to the toes  Sural, saphenous, tibial, superficial and deep peroneal motor and sensory distributions intact  Sensation light touch intact distally    Physical Exam  Constitutional:       Appearance: She is well-developed  HENT:      Right Ear: External ear normal       Left Ear: External ear normal       Nose: Nose normal    Eyes:      Conjunctiva/sclera: Conjunctivae normal       Pupils: Pupils are equal, round, and reactive to light  Neck:      Musculoskeletal: Normal range of motion  Pulmonary:      Effort: Pulmonary effort is normal    Musculoskeletal: Normal range of motion     Skin: General: Skin is warm and dry  Neurological:      Mental Status: She is alert and oriented to person, place, and time  Psychiatric:         Behavior: Behavior normal          Thought Content:  Thought content normal          Judgment: Judgment normal          Imaging:  No new imaging reviewed this visit    Scribe Attestation    I,:  Arnold Curtis am acting as a scribe while in the presence of the attending physician :       I,:  Jim Lopez DO personally performed the services described in this documentation    as scribed in my presence :

## 2021-05-21 ENCOUNTER — APPOINTMENT (OUTPATIENT)
Dept: PHYSICAL THERAPY | Facility: CLINIC | Age: 51
End: 2021-05-21
Payer: COMMERCIAL

## 2021-05-25 ENCOUNTER — APPOINTMENT (OUTPATIENT)
Dept: PHYSICAL THERAPY | Facility: CLINIC | Age: 51
End: 2021-05-25
Payer: COMMERCIAL

## 2021-05-25 NOTE — PROGRESS NOTES
PT Discharge    Today's date: 2021  Patient name: Aurelia Huggins  : 1970  MRN: [id number]  Referring provider: Greg Beckford  Dx:   Encounter Diagnosis     ICD-10-CM    1  Tear of lateral cartilage or meniscus of knee, current, left, subsequent encounter  S83 282D    2  Muscle weakness of lower extremity  M62 81    3  Left knee pain, unspecified chronicity  M25 562        Start Time: 1031  Stop Time: 1110  Total time in clinic (min): 39 minutes    Assessment  Assessment details: ADDENDUM: 2021: Aurelia Huggins is a 54 y o  female that has attended 3 sessions of physical therapy will be discharged from formal PT at this time  The patient had difficulty attending therapy due to personal reasons  The patient was given home exercise program information during the formal therapy sessions and is able to continue these post discharge  NO NEW OBJECTIVE MEASURES WERE COMPLETED  D/C FORMAL PT  Omero Manifold Understanding of Dx/Px/POC: good   Prognosis: good    Goals    GOALS TO BE ACHIEVED BY THE END OF THE PRE-OP VISIT  1  Patient have all questions answered / receive pre operative education regarding precautions/wound care//ADL's  MET   2  Patient to be independent with his phase 1 post operative exercises to perform pre surgery  MET     ALL GOALS N/A DUE TO PATIENT SELF DISCHARGE    GOALS TO BE UPDATED/ADJUSTED on 2021  STG: Achieve in 4-6 weeks  1  Patient's L knee pain at worst less than 3/10 to allow for proper gait  PROGRESSING  2  Patient's L knee flexion ROM improve by 20-75 degrees to improve squatting  PROGRESSING  3  LE MMT improve to > 4+/5 all motions tested to improve ADL/recreational activities  PROGRESSING   4  Timed up and go score improve to less than 14 seconds to indicate improved balance/decreased falls risk  PROGRESSING    LTG:  Achieve in 6-12 weeks  1  Patient's knee FOTO score improve to > 60% to indicate a return to normal functioning  PROGRESSING  2    Patient achieve personal goal of ambulating up/down the steps normally and toilet transfers without difficulty L knee  PROGRESSING  3  Patient to achieve independence with home exercise plan  PROGRESSING      Plan  Plan details: ADDENDUM: 5/25/2021: Jonn Butt is a 54 y o  female that has attended 3 sessions of physical therapy will be discharged from formal PT at this time  The patient had difficulty attending therapy due to personal reasons  The patient was given home exercise program information during the formal therapy sessions and is able to continue these post discharge  NO NEW OBJECTIVE MEASURES WERE COMPLETED  D/C FORMAL PT  Treatment plan discussed with: PTA and patient        Subjective Evaluation    History of Present Illness  Date of onset: 9/9/2020  Mechanism of injury: ADDENDUM: 5/25/2021: Jonn Butt is a 54 y o  female that has attended 3 sessions of physical therapy will be discharged from formal PT at this time  The patient had difficulty attending therapy due to personal reasons  The patient was given home exercise program information during the formal therapy sessions and is able to continue these post discharge  NO NEW OBJECTIVE MEASURES WERE COMPLETED  D/C FORMAL PT         SUBJECTIVE: 5/7/2021:  The patient notes she experienced terrible whole left leg pain the day after surgery  The patient called and spoke with Dr Liya Taylor regarding the pain increase - he felt this was partially from the surgery but also from a Hx of LBP  The patient now feels improved  - now just having local left knee pain  The patient has the most difficulty bending the left knee  She is using the B/L crutches for community distance walking but feels she does not need them in the home  INJURY HISTORY: Jonn Butt is a 54 y o  female that presents to outpatient physical therapy with complaints of left knee pain and instability walking    The patient reports feeling increased pain after turning while walking at home as onset of pain  The patient has attempted conservative measures which have failed  The patient presents for a Pre-Op visit to learn exercises, ask questions about the upcoming surgery 2021 and learn movement precautions/ambulatory techniques w/ a device  The patient's main goal for physical therapy is to learn her Pre op exercises and have all questions answered  Pain  Current pain ratin  At best pain ratin  At worst pain rating: 10  Location: L lateral knee; L inferiorl patella  Quality: dull ache and knife-like  Aggravating factors: standing, walking, stair climbing and running  Progression: no change    Social Support  Steps to enter house: yes  Stairs in house: no   Lives in: [facility]  Lives with: spouse and adult children    Employment status: not working  Hand dominance: right          Objective     Observations     Additional Observation Details  Patient's postoperative dressings removed and incisions inspected  The patient was educated on caring for his incisions and watching for signs of infection  All patient questions related to the incisions were answered at this time  The patient's incisions were cleaned with alcohol swabs and redressed with guaze and band aids  Palpation   Left   No palpable tenderness to the distal semimembranosus, distal semitendinosus, lateral gastrocnemius, medial gastrocnemius, rectus femoris, vastus lateralis and vastus medialis  Tenderness   Left Knee   Tenderness in the lateral joint line, medial joint line and patellar tendon       Neurological Testing     Sensation     Knee   Left Knee   Intact: light touch    Right Knee   Intact: light touch     Active Range of Motion   Left Knee   Flexion: 45 degrees   Extension: 0 degrees     Right Knee   Flexion: 130 degrees   Extension: 5 degrees     Mobility   Patellar Mobility:   Left Knee   Hypomobile: left medial, left lateral, left superior and left inferior    Right Knee WFL: medial, lateral, superior and inferior    Strength/Myotome Testing     Left Hip   Planes of Motion   Flexion: 3-  Extension: 3  Abduction: 3  Adduction: 3    Right Hip   Planes of Motion   Flexion: 5  Extension: 5  Abduction: 5  Adduction: 5    Left Knee   Flexion: 3  Extension: 3  Quadriceps contraction: fair    Right Knee   Flexion: 5  Extension: 5  Quadriceps contraction: good    Tests     Additional Tests Details  TU seconds B/L crutches    Gait: Gait impairments: Patient ambulates with B/L axillary crutches WBAT L LE  The patient demonstrates slow gait speed, unequal step lengths, and decreased heel-toe rollover  (+) Antalgic gait pattern      KOOS: Symptoms: 71 4  Pain: 77 8  Function: 89 7  Recreation: 66 7  QOL: 100    Swelling     Left Knee Girth Measurement (cm)   Joint line: 46 cm    Right Knee Girth Measurement (cm)   Joint line: 42 cm      Flowsheet Rows      Most Recent Value   PT/OT G-Codes   Current Score  72   Projected Score  60

## 2021-05-28 ENCOUNTER — APPOINTMENT (OUTPATIENT)
Dept: PHYSICAL THERAPY | Facility: CLINIC | Age: 51
End: 2021-05-28
Payer: COMMERCIAL

## 2021-06-15 ENCOUNTER — OFFICE VISIT (OUTPATIENT)
Dept: OBGYN CLINIC | Facility: CLINIC | Age: 51
End: 2021-06-15
Payer: COMMERCIAL

## 2021-06-15 VITALS
DIASTOLIC BLOOD PRESSURE: 79 MMHG | HEART RATE: 71 BPM | WEIGHT: 268 LBS | SYSTOLIC BLOOD PRESSURE: 116 MMHG | BODY MASS INDEX: 38.37 KG/M2 | HEIGHT: 70 IN

## 2021-06-15 DIAGNOSIS — M17.12 PRIMARY OSTEOARTHRITIS OF LEFT KNEE: ICD-10-CM

## 2021-06-15 DIAGNOSIS — Z98.890 S/P LEFT KNEE ARTHROSCOPY: Primary | ICD-10-CM

## 2021-06-15 PROCEDURE — 20610 DRAIN/INJ JOINT/BURSA W/O US: CPT | Performed by: PHYSICIAN ASSISTANT

## 2021-06-15 PROCEDURE — 99024 POSTOP FOLLOW-UP VISIT: CPT | Performed by: PHYSICIAN ASSISTANT

## 2021-06-15 RX ORDER — METHYLPREDNISOLONE ACETATE 40 MG/ML
2 INJECTION, SUSPENSION INTRA-ARTICULAR; INTRALESIONAL; INTRAMUSCULAR; SOFT TISSUE
Status: COMPLETED | OUTPATIENT
Start: 2021-06-15 | End: 2021-06-15

## 2021-06-15 RX ORDER — BUPIVACAINE HYDROCHLORIDE 2.5 MG/ML
4 INJECTION, SOLUTION INFILTRATION; PERINEURAL
Status: COMPLETED | OUTPATIENT
Start: 2021-06-15 | End: 2021-06-15

## 2021-06-15 RX ADMIN — METHYLPREDNISOLONE ACETATE 2 ML: 40 INJECTION, SUSPENSION INTRA-ARTICULAR; INTRALESIONAL; INTRAMUSCULAR; SOFT TISSUE at 08:56

## 2021-06-15 RX ADMIN — BUPIVACAINE HYDROCHLORIDE 4 ML: 2.5 INJECTION, SOLUTION INFILTRATION; PERINEURAL at 08:56

## 2021-06-15 NOTE — PROGRESS NOTES
ASSESSMENT/PLAN:    Diagnoses and all orders for this visit:    S/P left knee arthroscopy    Primary osteoarthritis of left knee    Other orders  -     Large joint arthrocentesis         the patient's left knee was aspirated for 60 cc of clear, yellow fluid  Her knee was then injected with Depo-Medrol and Marcaine  She tolerated the injections quite well  Her range of motion was greatly improved after the aspiration  She will follow up with our office in 6 weeks for strength and motion check  The patient is acceptable to this plan  Return in about 6 weeks (around 7/27/2021)  _____________________________________________________  CHIEF COMPLAINT:  Chief Complaint   Patient presents with    Left Knee - Follow-up, Post-op         SUBJECTIVE:  Vaughn Driver is a 54 y o  female status post left knee arthroscopy from 05/05/2021  The patient presents to our office for a postop visit  She states that postoperatively she felt much better; however, she recently moved and her activity level has increased  She is unsure whether she twisted her knee at all, but she notes increased pain and swelling today  She denies any numbness or tingling  She denies any fever or chills       The following portions of the patient's history were reviewed and updated as appropriate: allergies, current medications, past family history, past medical history, past social history, past surgical history and problem list     PAST MEDICAL HISTORY:  Past Medical History:   Diagnosis Date    Bulging of cervical intervertebral disc     Celiac disease     [address] disease ([facility] [address] )     Disease of thyroid gland     DVT (deep venous thrombosis) ([facility] [address] )     Migraines     Neck pain, chronic     Polycystic ovarian syndrome     PONV (postoperative nausea and vomiting)     TMJ (temporomandibular joint syndrome)     Wears glasses        PAST SURGICAL HISTORY:  Past Surgical History:   Procedure Laterality Date    ADENOIDECTOMY      ANKLE SURGERY Left     reconstruction     SECTION      x2    DIAGNOSTIC LAPAROSCOPY      HYSTERECTOMY      [place] ARTHRODESIS ANT INTERBODY INC DISCECTOMY, CERVICAL BELOW C2 Bilateral 2019    Procedure: C5/6 ANTERIOR CERVICAL DECOMPRESSION AND FUSION;  Surgeon: Yuni Tucker MD;  [facility];  Service: Neurosurgery    [place] KNEE SCOPE,MED/LAT MENISECTOMY Left 2021    Procedure: KNEE ARTHROSCOPY, medial and lateral meniscectomy, chondrooplasty, synovectomy, injection;  Surgeon: Shakira Banks DO;  Location: [facility] MAIN OR;  Service: Orthopedics    TONSILLECTOMY      TUBAL LIGATION         FAMILY HISTORY:  Family History   Adopted: Yes   Family history unknown: Yes       SOCIAL HISTORY:  Social History     Tobacco Use    Smoking status: Current Every Day Smoker     Packs/day: 1 00     Types: Cigarettes    Smokeless tobacco: Never Used    Tobacco comment: she's "working on it"   Vaping Use    Vaping Use: Never used   Substance Use Topics    Alcohol use: Never    Drug use: Never       MEDICATIONS:    Current Outpatient Medications:     cholecalciferol (VITAMIN D3) 1,000 units tablet, Take 2,000 Units by mouth daily, Disp: , Rfl:     Cyanocobalamin (VITAMIN B 12 PO), Take 1 tablet by mouth daily , Disp: , Rfl:     ferrous sulfate (IRON SUPPLEMENT) 325 (65 Fe) mg tablet, Take 325 mg by mouth daily with breakfast, Disp: , Rfl:     levothyroxine 100 mcg tablet, Take 100 mcg by mouth daily, Disp: , Rfl:     pramipexole (MIRAPEX) 0 5 mg tablet, Take 0 5 mg by mouth 2 (two) times a day , Disp: , Rfl:     topiramate (TOPAMAX) 50 MG tablet, Take 50 mg by mouth 2 (two) times a day , Disp: , Rfl:     Ustekinumab (STELARA IV), Infuse into a venous catheter every 56 days Every 8 weeks; home injection Last dose 3/17/2021, Disp: , Rfl:     apixaban (Eliquis) 5 mg, Take 2 5 mg by mouth 2 (two) times a day Last dose , Disp: , Rfl:     topiramate (TOPAMAX) 25 mg sprinkle capsule, Take 25 mg by mouth daily in the early morning, Disp: , Rfl:     ALLERGIES:  Allergies   Allergen Reactions    Amoxicillin Rash    Codeine Rash and Other (See Comments)     Rash    Diclofenac Rash    Penicillins Other (See Comments)     Rash    Sulfa Antibiotics Other (See Comments) and Hives     Rash  Other reaction(s): Other (See Comments)  Rash    Diclofenac Sodium Rash    Etodolac Hives, Rash and Other (See Comments)     Rash         ROS:  Review of Systems     Constitutional: Negative for fatigue, fever or loss of appetite  HENT: Negative  Respiratory: Negative for shortness of breath, dyspnea  Cardiovascular: Negative for chest pain/tightness  Gastrointestinal: Negative for abdominal pain, N/V  Endocrine: Negative for cold/heat intolerance, unexplained weight loss/gain  Genitourinary: Negative for flank pain, dysuria, hematuria  Musculoskeletal: Positive for arthralgia   Skin: Negative for rash  Neurological: Negative for numbness or tingling  Psychiatric/Behavioral: Negative for agitation  _____________________________________________________  PHYSICAL EXAMINATION:    Blood pressure 116/79, pulse 71, height 5' 10" (1 778 m), weight 122 kg (268 lb), not currently breastfeeding  Constitutional: Oriented to person, place, and time  Appears well-developed and well-nourished  No distress  HENT:   Head: Normocephalic  Eyes: Conjunctivae are normal  Right eye exhibits no discharge  Left eye exhibits no discharge  No scleral icterus  Cardiovascular: Normal rate  Pulmonary/Chest: Effort normal    Neurological: Alert and oriented to person, place, and time  Skin: Skin is warm and dry  No rash noted  Not diaphoretic  No erythema  No pallor  Psychiatric: Normal mood and affect   Behavior is normal  Judgment and thought content normal       MUSCULOSKELETAL EXAMINATION:   Physical Exam  Ortho Exam      Left lower extremity is neurovascularly intact   Toes are pink and mobile   Compartments are soft   No warmth, erythema or ecchymosis present  There is appreciable effusion present   Medial joint line and slight lateral joint line tenderness   A brisk cap refill   Sensation intact   range of motion of the knee was improved after aspiration to 0 to 115°  Objective:  BP Readings from Last 1 Encounters:   06/15/21 116/79      Wt Readings from Last 1 Encounters:   06/15/21 122 kg (268 lb)        BMI:   Estimated body mass index is 38 45 kg/m² as calculated from the following:    Height as of this encounter: 5' 10" (1 778 m)  Weight as of this encounter: 122 kg (268 lb)  PROCEDURES PERFORMED:  Large joint arthrocentesis: L knee  Universal Protocol:  Consent given by: patient  Time out: Immediately prior to procedure a "time out" was called to verify the correct patient, procedure, equipment, support staff and site/side marked as required    Site marked: the operative site was marked  Supporting Documentation  Indications: pain   Procedure Details  Location: knee - L knee  Preparation: Patient was prepped and draped in the usual sterile fashion  Needle size: 22 G  Approach: lateral  Medications administered: 4 mL bupivacaine 0 25 %; 2 mL methylPREDNISolone acetate 40 mg/mL    Aspirate amount: 60 mL  Aspirate: clear and yellow    Patient tolerance: patient tolerated the procedure well with no immediate complications  Dressing:  Sterile dressing applied            Marvin Evans PA-C

## 2021-07-12 ENCOUNTER — HOSPITAL ENCOUNTER (OUTPATIENT)
Dept: RADIOLOGY | Facility: HOSPITAL | Age: 51
Discharge: HOME | End: 2021-07-12
Payer: COMMERCIAL

## 2021-07-12 DIAGNOSIS — M54.50 CHRONIC MIDLINE LOW BACK PAIN WITHOUT SCIATICA: ICD-10-CM

## 2021-07-12 DIAGNOSIS — K50.811 CROHN'S DISEASE OF BOTH SMALL AND LARGE INTESTINE WITH RECTAL BLEEDING (HCC): ICD-10-CM

## 2021-07-12 DIAGNOSIS — M70.62 TROCHANTERIC BURSITIS OF LEFT HIP: ICD-10-CM

## 2021-07-12 DIAGNOSIS — G89.29 CHRONIC MIDLINE LOW BACK PAIN WITHOUT SCIATICA: ICD-10-CM

## 2021-07-12 PROCEDURE — 72100 X-RAY EXAM L-S SPINE 2/3 VWS: CPT

## 2021-07-12 PROCEDURE — 72202 X-RAY EXAM SI JOINTS 3/> VWS: CPT

## 2021-08-16 ENCOUNTER — OFFICE VISIT (OUTPATIENT)
Dept: URGENT CARE | Facility: CLINIC | Age: 51
End: 2021-08-16
Payer: COMMERCIAL

## 2021-08-16 ENCOUNTER — APPOINTMENT (OUTPATIENT)
Dept: RADIOLOGY | Facility: CLINIC | Age: 51
End: 2021-08-16
Payer: COMMERCIAL

## 2021-08-16 VITALS
DIASTOLIC BLOOD PRESSURE: 56 MMHG | BODY MASS INDEX: 38.45 KG/M2 | TEMPERATURE: 97.7 F | WEIGHT: 268 LBS | RESPIRATION RATE: 20 BRPM | HEART RATE: 64 BPM | OXYGEN SATURATION: 100 % | SYSTOLIC BLOOD PRESSURE: 112 MMHG

## 2021-08-16 DIAGNOSIS — M25.562 ACUTE PAIN OF LEFT KNEE: ICD-10-CM

## 2021-08-16 DIAGNOSIS — M25.562 ACUTE PAIN OF LEFT KNEE: Primary | ICD-10-CM

## 2021-08-16 PROCEDURE — 73564 X-RAY EXAM KNEE 4 OR MORE: CPT

## 2021-08-16 PROCEDURE — 99213 OFFICE O/P EST LOW 20 MIN: CPT | Performed by: PHYSICIAN ASSISTANT

## 2021-08-16 NOTE — PATIENT INSTRUCTIONS
Knee Pain   WHAT YOU NEED TO KNOW:   Knee pain may start suddenly, or it may be a long-term problem  You may have pain on the side, front, or back of your knee  You may have knee stiffness and swelling  You may hear popping sounds or feel like your knee is giving way or locking up as you walk  You may feel pain when you sit, stand, walk, or climb up and down stairs  Knee pain can be caused by conditions such as obesity, inflammation, or strains or tears in ligaments or tendons  DISCHARGE INSTRUCTIONS:   Return to the emergency department if:   · Your pain is worse, even after treatment  · You cannot bend or straighten your leg completely  · The swelling around your knee does not go down even with treatment  · Your knee is painful and hot to the touch  Contact your healthcare provider if:   · You have questions or concerns about your condition or care  Medicines: You may need any of the following:  · NSAIDs  help decrease swelling and pain or fever  This medicine is available with or without a doctor's order  NSAIDs can cause stomach bleeding or kidney problems in certain people  If you take blood thinner medicine, always ask your healthcare provider if NSAIDs are safe for you  Always read the medicine label and follow directions  · Acetaminophen  decreases pain and fever  It is available without a doctor's order  Ask how much to take and how often to take it  Follow directions  Read the labels of all other medicines you are using to see if they also contain acetaminophen, or ask your doctor or pharmacist  Acetaminophen can cause liver damage if not taken correctly  Do not use more than 4 grams (4,000 milligrams) total of acetaminophen in one day  · Prescription pain medicine  may be given  Ask your healthcare provider how to take this medicine safely  Some prescription pain medicines contain acetaminophen   Do not take other medicines that contain acetaminophen without talking to your healthcare provider  Too much acetaminophen may cause liver damage  Prescription pain medicine may cause constipation  Ask your healthcare provider how to prevent or treat constipation  · Take your medicine as directed  Contact your healthcare provider if you think your medicine is not helping or if you have side effects  Tell him or her if you are allergic to any medicine  Keep a list of the medicines, vitamins, and herbs you take  Include the amounts, and when and why you take them  Bring the list or the pill bottles to follow-up visits  Carry your medicine list with you in case of an emergency  What you can do to manage your symptoms:   · Rest your knee so it can heal   Limit activities that increase your pain  Do low-impact exercises, such as walking or swimming  · Apply ice to help reduce swelling and pain  Use an ice pack, or put crushed ice in a plastic bag  Cover it with a towel before you apply it to your knee  Apply ice for 15 to 20 minutes every hour, or as directed  · Apply compression to help reduce swelling  Use a brace or bandage only as directed  · Elevate your knee to help decrease pain and swelling  Elevate your knee while you are sitting or lying down  Prop your leg on pillows to keep your knee above the level of your heart  · Prevent your knee from moving as directed  Your healthcare provider may put on a cast or splint  You may need to wear a leg brace to stabilize your knee  A leg brace can be adjusted to increase your range of motion as your knee heals  What you can do to prevent knee pain:   · Maintain a healthy weight  Extra weight increases your risk for knee pain  Ask your healthcare provider how much you should weigh  He or she can help you create a safe weight loss plan if you need to lose weight  · Exercise or train properly  Use the correct equipment for sports  Wear shoes that provide good support   Check your posture often as you exercise, play sports, or train for an event  This can help prevent stress and strain on your knees  Rest between sessions so you do not overwork your knees  Follow up with your healthcare provider within 24 hours or as directed: You may need follow-up treatments, such as steroid injections to decrease pain  Write down your questions so you remember to ask them during your visits  © Copyright [address] 2021 Information is for End User's use only and may not be sold, redistributed or otherwise used for commercial purposes  All illustrations and images included in CareNotes® are the copyrighted property of [employer]  or [facility] Matt Galarza   The above information is an  only  It is not intended as medical advice for individual conditions or treatments  Talk to your doctor, nurse or pharmacist before following any medical regimen to see if it is safe and effective for you

## 2021-08-16 NOTE — PROGRESS NOTES
[facility]        NAME: oCdy Hills is a 54 y o  female  : 1970    MRN: [id number]  DATE: 2021  TIME: 3:56 PM    Assessment and Plan   Acute pain of left knee [M25 562]  1  Acute pain of left knee  XR knee 4+ vw left injury         Patient Instructions       Follow up with orthopedics  Take Tylenol as needed for pain  Proceed to  ER if symptoms worsen  Chief Complaint     Chief Complaint   Patient presents with    Knee Pain     surg 21, continues to be painful         History of Present Illness         Patient presents with persistent left knee pain she had arthroscopic surgery on May 5th repair and meniscal tear and to remove arthritis  On  the large effusion which was drained  He has persistent left knee pain it difficult to ambulate weight bear and maneuver steps  Review of Systems   Review of Systems   Constitutional: Negative for chills and fever  Musculoskeletal: Positive for arthralgias  Skin: Negative for wound           Current Medications       Current Outpatient Medications:     apixaban (Eliquis) 5 mg, Take 2 5 mg by mouth 2 (two) times a day Last dose , Disp: , Rfl:     cholecalciferol (VITAMIN D3) 1,000 units tablet, Take 2,000 Units by mouth daily, Disp: , Rfl:     Cyanocobalamin (VITAMIN B 12 PO), Take 1 tablet by mouth daily , Disp: , Rfl:     ferrous sulfate (IRON SUPPLEMENT) 325 (65 Fe) mg tablet, Take 325 mg by mouth daily with breakfast, Disp: , Rfl:     levothyroxine 100 mcg tablet, Take 100 mcg by mouth daily, Disp: , Rfl:     pramipexole (MIRAPEX) 0 5 mg tablet, Take 0 5 mg by mouth 2 (two) times a day , Disp: , Rfl:     topiramate (TOPAMAX) 25 mg sprinkle capsule, Take 25 mg by mouth daily in the early morning, Disp: , Rfl:     topiramate (TOPAMAX) 50 MG tablet, Take 50 mg by mouth 2 (two) times a day , Disp: , Rfl:     Ustekinumab (STELARA IV), Infuse into a venous catheter every 56 days Every 8 weeks; home injection Last dose 3/17/2021, Disp: , Rfl:     Current Allergies     Allergies as of 2021 - Reviewed 2021   Allergen Reaction Noted    Amoxicillin Rash 2018    Codeine Rash and Other (See Comments) 2018    Diclofenac Rash 2019    Penicillins Other (See Comments) 2018    Sulfa antibiotics Other (See Comments) and Hives 2018    Diclofenac sodium Rash 2019    Etodolac Hives, Rash, and Other (See Comments) 2018            The following portions of the patient's history were reviewed and updated as appropriate: allergies, current medications, past family history, past medical history, past social history, past surgical history and problem list      Past Medical History:   Diagnosis Date    Bulging of cervical intervertebral disc     Celiac disease     Crohn disease ([address] )     Disease of thyroid gland     [facility])     Migraines     Neck pain, chronic     Polycystic ovarian syndrome     PONV (postoperative nausea and vomiting)     TMJ (temporomandibular joint syndrome)     Wears glasses        Past Surgical History:   Procedure Laterality Date    ADENOIDECTOMY      ANKLE SURGERY Left     reconstruction     SECTION      x2    DIAGNOSTIC LAPAROSCOPY      HYSTERECTOMY      MI ARTHRODESIS ANT INTERBODY INC DISCECTOMY, CERVICAL BELOW C2 Bilateral 2019    Procedure: C5/6 ANTERIOR CERVICAL DECOMPRESSION AND FUSION;  Surgeon: Octavio Quintanilla MD;  [facility];  Service: Neurosurgery    MI KNEE SCOPE,MED/LAT MENISECTOMY Left 2021    Procedure: KNEE ARTHROSCOPY, medial and lateral meniscectomy, chondrooplasty, synovectomy, injection;  Surgeon: Oswald Naylor DO;  Location: [facility] MAIN OR;  Service: Orthopedics    TONSILLECTOMY      TUBAL LIGATION         Family History   Adopted: Yes   Family history unknown: Yes         Medications have been verified          Objective   /56   Pulse 64   Temp 97 7 °F (36 5 °C) Resp 20   Wt 122 kg (268 lb)   SpO2 100%   BMI 38 45 kg/m²   No LMP recorded  Patient has had a hysterectomy  Physical Exam     Physical Exam  Constitutional:       Appearance: Normal appearance  HENT:      Head: Normocephalic and atraumatic  Cardiovascular:      Rate and Rhythm: Normal rate and regular rhythm  Pulmonary:      Effort: Pulmonary effort is normal    Musculoskeletal:      Comments: Left knee without erythema or ecchymosis  There is swelling  Skin:     General: Skin is warm  Neurological:      Mental Status: She is alert

## 2021-08-31 ENCOUNTER — HOSPITAL ENCOUNTER (EMERGENCY)
Facility: HOSPITAL | Age: 51
Discharge: HOME | End: 2021-08-31
Attending: EMERGENCY MEDICINE | Admitting: EMERGENCY MEDICINE
Payer: COMMERCIAL

## 2021-08-31 ENCOUNTER — APPOINTMENT (EMERGENCY)
Dept: NON INVASIVE DIAGNOSTICS | Facility: HOSPITAL | Age: 51
End: 2021-08-31
Payer: COMMERCIAL

## 2021-08-31 ENCOUNTER — APPOINTMENT (EMERGENCY)
Dept: RADIOLOGY | Facility: HOSPITAL | Age: 51
End: 2021-08-31
Payer: COMMERCIAL

## 2021-08-31 VITALS
WEIGHT: 270 LBS | SYSTOLIC BLOOD PRESSURE: 136 MMHG | TEMPERATURE: 97.5 F | BODY MASS INDEX: 38.74 KG/M2 | DIASTOLIC BLOOD PRESSURE: 66 MMHG | OXYGEN SATURATION: 96 % | HEART RATE: 74 BPM | RESPIRATION RATE: 18 BRPM

## 2021-08-31 DIAGNOSIS — I80.9 SUPERFICIAL THROMBOPHLEBITIS: Primary | ICD-10-CM

## 2021-08-31 PROCEDURE — 99284 EMERGENCY DEPT VISIT MOD MDM: CPT

## 2021-08-31 PROCEDURE — 93971 EXTREMITY STUDY: CPT

## 2021-08-31 PROCEDURE — 73564 X-RAY EXAM KNEE 4 OR MORE: CPT

## 2021-08-31 PROCEDURE — 99284 EMERGENCY DEPT VISIT MOD MDM: CPT | Performed by: EMERGENCY MEDICINE

## 2021-08-31 PROCEDURE — 93971 EXTREMITY STUDY: CPT | Performed by: SURGERY

## 2021-08-31 RX ORDER — IBUPROFEN 800 MG/1
800 TABLET ORAL 3 TIMES DAILY
Qty: 21 TABLET | Refills: 0 | Status: SHIPPED | OUTPATIENT
Start: 2021-08-31

## 2021-08-31 RX ORDER — ACETAMINOPHEN 325 MG/1
975 TABLET ORAL ONCE
Status: COMPLETED | OUTPATIENT
Start: 2021-08-31 | End: 2021-08-31

## 2021-08-31 RX ADMIN — ACETAMINOPHEN 975 MG: 325 TABLET ORAL at 12:12

## 2021-08-31 NOTE — ED PROVIDER NOTES
History  Chief Complaint   Patient presents with    Leg Pain     Patient is a 72-year-old female with history of varicose veins, DVT left lower extremity recently taken off Eliquis that presents with left leg pain  Patient says over the past several days she has had gradual worsening of sharp/stabbing left calf pain  Pain is located over 1 of her varicosities  She denies any direct trauma to the area  She has been using Tylenol without much relief  Patient admits that she has not been using compression and elevation like she should for her was veins  She denies associated nausea vomiting, chest pain, dyspnea abdominal pain  Patient also has arthritis in both knees and is being treated for that by orthopedics  She denies weakness, numbness, paresthesias left foot  Prior to Admission Medications   Prescriptions Last Dose Informant Patient Reported? Taking?    Cyanocobalamin (VITAMIN B 12 PO)  Self Yes No   Sig: Take 1 tablet by mouth daily    Ustekinumab (STELARA IV)  Self Yes No   Sig: Infuse into a venous catheter every 56 days Every 8 weeks; home injection  Last dose 3/17/2021   cholecalciferol (VITAMIN D3) 1,000 units tablet  Self Yes No   Sig: Take 2,000 Units by mouth daily   ferrous sulfate (IRON SUPPLEMENT) 325 (65 Fe) mg tablet  Self Yes No   Sig: Take 325 mg by mouth daily with breakfast   levothyroxine 100 mcg tablet  Self Yes No   Sig: Take 100 mcg by mouth daily   pramipexole (MIRAPEX) 0 5 mg tablet  Self Yes No   Sig: Take 0 5 mg by mouth 2 (two) times a day    topiramate (TOPAMAX) 25 mg sprinkle capsule  Self Yes No   Sig: Take 25 mg by mouth daily in the early morning   topiramate (TOPAMAX) 50 MG tablet  Self Yes No   Sig: Take 50 mg by mouth 2 (two) times a day       Facility-Administered Medications: None       Past Medical History:   Diagnosis Date    Bulging of cervical intervertebral disc     Celiac disease     [facility] [address] [facility]     Disease of thyroid gland     DVT (deep venous thrombosis) ([facility])     Migraines     Neck pain, chronic     Polycystic ovarian syndrome     PONV (postoperative nausea and vomiting)     TMJ (temporomandibular joint syndrome)     Wears glasses        Past Surgical History:   Procedure Laterality Date    ADENOIDECTOMY      ANKLE SURGERY Left     reconstruction     SECTION      x2    DIAGNOSTIC LAPAROSCOPY      HYSTERECTOMY      OH ARTHRODESIS ANT INTERBODY INC DISCECTOMY, CERVICAL BELOW C2 Bilateral 2019    Procedure: C5/6 ANTERIOR CERVICAL DECOMPRESSION AND FUSION;  Surgeon: Trace Mobley MD;  [facility];  Service: Neurosurgery    OH KNEE SCOPE,MED/LAT MENISECTOMY Left 2021    Procedure: KNEE ARTHROSCOPY, medial and lateral meniscectomy, chondrooplasty, synovectomy, injection;  Surgeon: Doreen Carlos DO;  Location[facility] MAIN OR;  Service: Orthopedics    TONSILLECTOMY      TUBAL LIGATION         Family History   Adopted: Yes   Family history unknown: Yes     I have reviewed and agree with the history as documented  E-Cigarette/Vaping    E-Cigarette Use Never User      E-Cigarette/Vaping Substances    Nicotine No     THC No     CBD No     Flavoring No     Other No     Unknown No      Social History     Tobacco Use    Smoking status: Current Every Day Smoker     Packs/day: 1 00     Types: Cigarettes    Smokeless tobacco: Never Used    Tobacco comment: she's "working on it"   Vaping Use    Vaping Use: Never used   Substance Use Topics    Alcohol use: Never    Drug use: Never       Review of Systems   Constitutional: Negative for fever  HENT: Negative for sore throat  Respiratory: Negative for shortness of breath  Cardiovascular: Negative for chest pain  Gastrointestinal: Negative for abdominal pain  Genitourinary: Negative for dysuria  Musculoskeletal: Negative for back pain  Left leg pain   Skin: Negative for rash  Neurological: Negative for light-headedness     Psychiatric/Behavioral: Negative for agitation  All other systems reviewed and are negative  Physical Exam  Physical Exam  Vitals reviewed  Constitutional:       General: She is not in acute distress  Appearance: She is well-developed  HENT:      Head: Normocephalic  Eyes:      Pupils: Pupils are equal, round, and reactive to light  Cardiovascular:      Rate and Rhythm: Normal rate and regular rhythm  Heart sounds: Normal heart sounds  Pulmonary:      Effort: Pulmonary effort is normal       Breath sounds: Normal breath sounds  Abdominal:      General: Bowel sounds are normal  There is no distension  Palpations: Abdomen is soft  Tenderness: There is no abdominal tenderness  There is no guarding  Musculoskeletal:         General: Tenderness present  No deformity  Normal range of motion  Cervical back: Normal range of motion and neck supple  Comments: Left lower extremity:  Below left knee area varicosity with tenderness and mild erythema  No bony tenderness left knee left ankle  Distally neurovascularly intact  Skin:     General: Skin is warm and dry  Capillary Refill: Capillary refill takes less than 2 seconds  Neurological:      Mental Status: She is alert and oriented to person, place, and time  Cranial Nerves: No cranial nerve deficit  Sensory: No sensory deficit  Psychiatric:         Behavior: Behavior normal          Thought Content:  Thought content normal          Judgment: Judgment normal          Vital Signs  ED Triage Vitals [08/31/21 1140]   Temperature Pulse Respirations Blood Pressure SpO2   97 5 °F (36 4 °C) 74 18 136/66 96 %      Temp Source Heart Rate Source Patient Position - Orthostatic VS BP Location FiO2 (%)   Tympanic Monitor Sitting Left arm --      Pain Score       Worst Possible Pain           Vitals:    08/31/21 1140   BP: 136/66   Pulse: 74   Patient Position - Orthostatic VS: Sitting         Visual Acuity      ED Medications  Medications acetaminophen (TYLENOL) tablet 975 mg (975 mg Oral Given 8/31/21 1212)       Diagnostic Studies  Results Reviewed     None                 VAS lower limb venous duplex study, unilateral/limited   Final Result by Grace Ganser, MD (08/31 1436)      XR knee 4+ vw left injury   ED Interpretation by Garrett De Jesus MD (08/31 1337)   ED wet read:  No acute fracture/dislocation noted                 Procedures  Procedures         ED Course                             SBIRT 22yo+      Most Recent Value   SBIRT (25 yo +)   In order to provide better care to our patients, we are screening all of our patients for alcohol and drug use  Would it be okay to ask you these screening questions? Yes Filed at: 08/31/2021 1214   Initial Alcohol Screen: US AUDIT-C    1  How often do you have a drink containing alcohol?  0 Filed at: 08/31/2021 1214   2  How many drinks containing alcohol do you have on a typical day you are drinking? 0 Filed at: 08/31/2021 1214   3a  Male UNDER 65: How often do you have five or more drinks on one occasion? 0 Filed at: 08/31/2021 1214   3b  FEMALE Any Age, or MALE 65+: How often do you have 4 or more drinks on one occassion? 0 Filed at: 08/31/2021 1214   Audit-C Score  0 Filed at: 08/31/2021 1214   TALAT: How many times in the past year have you    Used an illegal drug or used a prescription medication for non-medical reasons?   Never Filed at: 08/31/2021 1214            Wells' Criteria for DVT      Most Recent Value   Wells' Criteria for DVT   Active cancer Treatment or palliation within 6 months  0 Filed at: 08/31/2021 1157   Bedridden recently >3 days or major surgery within 12 weeks  0 Filed at: 08/31/2021 1157   Calf swelling >3 cm compared to the other leg  0 Filed at: 08/31/2021 1157   Entire leg swollen  0 Filed at: 08/31/2021 1157   Collateral (nonvaricose) superficial veins present  1 Filed at: 08/31/2021 1157   Localized tenderness along the deep venous system  1 Filed at: 08/31/2021 1157   Pitting edema, confined to symptomatic leg  0 Filed at: 08/31/2021 1157   Paralysis, paresis, or recent plaster immobilization of the lower extremity  0 Filed at: 08/31/2021 1157   Previously documented DVT  1 Filed at: 08/31/2021 1157   Alternative diagnosis to DVT as likely or more likely  2 Filed at: 08/31/2021 1157   Ruthven DVT Critera Score  1 Filed at: 08/31/2021 1157              [facility]  Number of Diagnoses or Management Options  Superficial thrombophlebitis  Diagnosis management comments: Patient is a 61-year-old female who presents for evaluation of left calf pain  Duplex negative  Duplex did show that she has superficial thrombophlebitis and varicose vein likely causing her symptoms  Imaging shows arthritis but otherwise no acute abnormalities  Patient advised on supportive measures and follow-up to vascular surgery given  Strict return precautions given  Disposition  Final diagnoses:   Superficial thrombophlebitis     Time reflects when diagnosis was documented in both MDM as applicable and the Disposition within this note     Time User Action Codes Description Comment    8/31/2021  1:37 PM Ruth Scott Add [I80 9] Superficial thrombophlebitis       ED Disposition     ED Disposition Condition Date/Time Comment    Discharge Stable Tue Aug 31, 2021  1:37 PM Kory Gustafson discharge to home/self care              Follow-up Information     Follow up With Specialties Details Why Contact Info Additional [address]  Emergency Department Emergency Medicine  If symptoms worsen [address]  [phone] [place] [id number]  Emergency [address] [place] [address] Vascular Surgery Schedule an appointment as soon as possible for a visit   [address] [place] [phone]  [address], [address] [place] [address]

## 2021-08-31 NOTE — DISCHARGE INSTRUCTIONS
-treat superficial thrombophlebitis with elevation, compression, ice/heat  -use NSAIDs including Motrin  -follow-up with vascular surgeon and return to ED if you have any new or worsening symptoms

## 2021-09-03 ENCOUNTER — TELEPHONE (OUTPATIENT)
Dept: VASCULAR SURGERY | Facility: CLINIC | Age: 51
End: 2021-09-03

## 2021-09-03 NOTE — TELEPHONE ENCOUNTER
Patient was seen in ER on 8/31 and had a doppler and showed superficial phlebetis in a cluster of varicose veins   She is noticing increased tenderness and redenss in the area, but not going higher in leg or any increased tenderness anywhere else in leg  I advised her to try warm soaks qid and she is using NSAIDs   If any change in sympotms , she could report to ED , and then I transferred call to call center to set up folloup appt

## 2021-09-05 ENCOUNTER — HOSPITAL ENCOUNTER (EMERGENCY)
Facility: HOSPITAL | Age: 51
Discharge: HOME | End: 2021-09-05
Attending: EMERGENCY MEDICINE
Payer: COMMERCIAL

## 2021-09-05 ENCOUNTER — APPOINTMENT (EMERGENCY)
Dept: NON INVASIVE DIAGNOSTICS | Facility: HOSPITAL | Age: 51
End: 2021-09-05
Payer: COMMERCIAL

## 2021-09-05 VITALS
HEIGHT: 69 IN | WEIGHT: 273 LBS | HEART RATE: 60 BPM | OXYGEN SATURATION: 100 % | SYSTOLIC BLOOD PRESSURE: 136 MMHG | TEMPERATURE: 97 F | DIASTOLIC BLOOD PRESSURE: 58 MMHG | BODY MASS INDEX: 40.43 KG/M2 | RESPIRATION RATE: 18 BRPM

## 2021-09-05 DIAGNOSIS — I80.02 SUPERFICIAL THROMBOPHLEBITIS OF LEFT LEG: Primary | ICD-10-CM

## 2021-09-05 LAB
ANION GAP SERPL CALCULATED.3IONS-SCNC: 8 MMOL/L (ref 4–13)
BASOPHILS # BLD AUTO: 0 THOUSANDS/ΜL (ref 0–0.1)
BASOPHILS NFR BLD AUTO: 0 % (ref 0–2)
BUN SERPL-MCNC: 13 MG/DL (ref 7–25)
CALCIUM SERPL-MCNC: 9.1 MG/DL (ref 8.6–10.5)
CHLORIDE SERPL-SCNC: 108 MMOL/L (ref 98–107)
CO2 SERPL-SCNC: 21 MMOL/L (ref 21–31)
CREAT SERPL-MCNC: 0.8 MG/DL (ref 0.6–1.2)
EOSINOPHIL # BLD AUTO: 0.3 THOUSAND/ΜL (ref 0–0.61)
EOSINOPHIL NFR BLD AUTO: 4 % (ref 0–5)
ERYTHROCYTE [DISTWIDTH] IN BLOOD BY AUTOMATED COUNT: 13.9 % (ref 11.5–14.5)
GFR SERPL CREATININE-BSD FRML MDRD: 87 ML/MIN/1.73SQ M
GLUCOSE SERPL-MCNC: 91 MG/DL (ref 65–99)
HCT VFR BLD AUTO: 38.3 % (ref 42–47)
HGB BLD-MCNC: 12.8 G/DL (ref 12–16)
INR PPP: 0.94 (ref 0.84–1.19)
LYMPHOCYTES # BLD AUTO: 1.4 THOUSANDS/ΜL (ref 0.6–4.47)
LYMPHOCYTES NFR BLD AUTO: 20 % (ref 21–51)
MCH RBC QN AUTO: 29.9 PG (ref 26–34)
MCHC RBC AUTO-ENTMCNC: 33.4 G/DL (ref 31–37)
MCV RBC AUTO: 89 FL (ref 81–99)
MONOCYTES # BLD AUTO: 0.4 THOUSAND/ΜL (ref 0.17–1.22)
MONOCYTES NFR BLD AUTO: 6 % (ref 2–12)
NEUTROPHILS # BLD AUTO: 5.1 THOUSANDS/ΜL (ref 1.4–6.5)
NEUTS SEG NFR BLD AUTO: 70 % (ref 42–75)
PLATELET # BLD AUTO: 220 THOUSANDS/UL (ref 149–390)
PMV BLD AUTO: 6.9 FL (ref 8.6–11.7)
POTASSIUM SERPL-SCNC: 3.9 MMOL/L (ref 3.5–5.5)
PROTHROMBIN TIME: 12.7 SECONDS (ref 11.6–14.5)
RBC # BLD AUTO: 4.29 MILLION/UL (ref 3.9–5.2)
SODIUM SERPL-SCNC: 137 MMOL/L (ref 134–143)
WBC # BLD AUTO: 7.3 THOUSAND/UL (ref 4.8–10.8)

## 2021-09-05 PROCEDURE — 36415 COLL VENOUS BLD VENIPUNCTURE: CPT | Performed by: EMERGENCY MEDICINE

## 2021-09-05 PROCEDURE — 85610 PROTHROMBIN TIME: CPT | Performed by: EMERGENCY MEDICINE

## 2021-09-05 PROCEDURE — 99284 EMERGENCY DEPT VISIT MOD MDM: CPT

## 2021-09-05 PROCEDURE — 93971 EXTREMITY STUDY: CPT

## 2021-09-05 PROCEDURE — 93971 EXTREMITY STUDY: CPT | Performed by: SURGERY

## 2021-09-05 PROCEDURE — 80048 BASIC METABOLIC PNL TOTAL CA: CPT | Performed by: EMERGENCY MEDICINE

## 2021-09-05 PROCEDURE — 99284 EMERGENCY DEPT VISIT MOD MDM: CPT | Performed by: EMERGENCY MEDICINE

## 2021-09-05 PROCEDURE — 85025 COMPLETE CBC W/AUTO DIFF WBC: CPT | Performed by: EMERGENCY MEDICINE

## 2021-09-05 RX ORDER — OXYCODONE HYDROCHLORIDE AND ACETAMINOPHEN 5; 325 MG/1; MG/1
1 TABLET ORAL ONCE
Status: COMPLETED | OUTPATIENT
Start: 2021-09-05 | End: 2021-09-05

## 2021-09-05 RX ADMIN — OXYCODONE HYDROCHLORIDE AND ACETAMINOPHEN 1 TABLET: 5; 325 TABLET ORAL at 06:51

## 2021-09-05 NOTE — DISCHARGE INSTRUCTIONS
Please follow-up with vascular surgery on September 10, 2021  Please specifically reviewed the need for Eliquis anticoagulation

## 2021-09-05 NOTE — ED CARE HANDOFF
Emergency Department Sign Out Note        Sign out and transfer of care from Dr Stephen Narvaez  See Separate Emergency Department note  The patient, Meliton Harris, was evaluated by the previous provider for L lower extremity swelling w/ pain  Workup Completed: All diagnostic workup pending  ED Course / Workup Pending (followup):  Labs, venous doppler US of L lower extremity  ED Course as of Sep 05 1030   Sun Sep 05, 2021   0700 Received signout from Dr Stephen Narvaez, use HPI and initial diagnostic impression  Patient is due for a venous ultrasound this morning ordered by Dr Stephen Narvaez  81-year-old female remote history of DVT in 2011 secondary to a left ankle surgery with a subsequent DVT within the last 18 months  Followed as an outpatient by [facility]  hematology, outpatient workup for intrinsic clotting disorders negative, been off Eliquis for approximately 3 months (joint decision by patient and hematology)  Seen here on 08/31/21 for L calf pain, swelling, venous Doppler showed patient to have superficial thrombophlebitis, advised patient to continue warm compresses  Patient noted over the last few days pain left calves gotten worse  No associated shortness of breath or chest pain  Awaiting baseline labs  7230 Vascular technician gave brief limb report, essentially unchanged from prior imaging  4782 Patient has a appointment on the 10th September 2021 with vascular surgery  Likely discussion about restarting the patient on Eliquis, patient's Hematology-Oncology provider noted (self reported by the patient), that her symptoms got worse she can roll was restarting the Eliquis, patient feels more comfortable restarting Eliquis and if needs to be continued as per vascular surgery they in make recommendations how to proceed          Procedures  MDM    Disposition  Final diagnoses:   Superficial thrombophlebitis of left leg     Time reflects when diagnosis was documented in both MDM as applicable and the Disposition within this note     Time User Action Codes Description Comment    9/5/2021 10:07 AM Candelaria Charlton Add [I80 02] Superficial thrombophlebitis of left leg       ED Disposition     ED Disposition Condition Date/Time Comment    Discharge Stable Sun Sep 5, 2021 10:06 AM Savita Durand discharge to home/self care  Follow-up Information     Follow up With Specialties Details Why Sania Mccoy MD [facility]  [address] [place] [address]  [phone]          Discharge Medication List as of 9/5/2021 10:09 AM      START taking these medications    Details   apixaban (ELIQUIS) 5 mg Multiple Dosages:Starting Sun 9/5/2021, Until Sat 9/11/2021 at 2359, THEN Starting Sun 9/12/2021, Until Mon 10/4/2021 at 2359Take 1 tablet (5 mg total) by mouth 2 (two) times a day for 7 days, THEN 1 tablet (5 mg total) 2 (two) times a day for 23 days   , Print         CONTINUE these medications which have NOT CHANGED    Details   cholecalciferol (VITAMIN D3) 1,000 units tablet Take 2,000 Units by mouth daily, Historical Med      Cyanocobalamin (VITAMIN B 12 PO) Take 1 tablet by mouth daily , Historical Med      ferrous sulfate (IRON SUPPLEMENT) 325 (65 Fe) mg tablet Take 325 mg by mouth daily with breakfast, Historical Med      ibuprofen (MOTRIN) 800 mg tablet Take 1 tablet (800 mg total) by mouth 3 (three) times a day, Starting Tue 8/31/2021, Print      levothyroxine 100 mcg tablet Take 100 mcg by mouth daily, Historical Med      pramipexole (MIRAPEX) 0 5 mg tablet Take 0 5 mg by mouth 2 (two) times a day , Historical Med      topiramate (TOPAMAX) 25 mg sprinkle capsule Take 25 mg by mouth daily in the early morning, Historical Med      topiramate (TOPAMAX) 50 MG tablet Take 50 mg by mouth 2 (two) times a day , Starting Thu 4/25/2019, Historical Med      Ustekinumab (STELARA IV) Infuse into a venous catheter every 56 days Every 8 weeks; home injection  Last dose 3/17/2021, Historical Med           No discharge procedures on file         ED Provider  Electronically Signed by     Montserrat Yanez III,   09/05/21 1404

## 2021-09-05 NOTE — ED PROVIDER NOTES
History  Chief Complaint   Patient presents with    Leg Swelling     Red, swollen L leg    Patient was here last week and was diagnosed with a blood clot    it has since gotten worse     Patient is a pleasant 72-year-old female presenting with left-sided leg pain  Patient was evaluated in the ER earlier in the week and diagnosed with superficial phlebitis of the left lower extremity  She also follow-up with her vascular surgeon several days later  She states over last several days her pain, swelling has been worse  She has a history of DVT and her anticoagulation was stopped 3 months ago as a trial to see how she does"  She has been taking Tylenol for pain and due to her history of Crohn's disease she has not been taking any NSAIDs or aspirin  Leg Pain  Location:  Leg  Injury: no    Leg location:  L leg  Pain details:     Quality:  Dull    Radiates to:  Does not radiate    Severity:  Moderate    Onset quality:  Gradual    Timing:  Constant    Progression:  Worsening  Chronicity:  New  Foreign body present:  No foreign bodies  Relieved by:  Nothing  Worsened by:  Nothing  Ineffective treatments:  Acetaminophen  Associated symptoms: no back pain and no fever        Prior to Admission Medications   Prescriptions Last Dose Informant Patient Reported? Taking?    Cyanocobalamin (VITAMIN B 12 PO)  Self Yes No   Sig: Take 1 tablet by mouth daily    Ustekinumab (STELARA IV)  Self Yes No   Sig: Infuse into a venous catheter every 56 days Every 8 weeks; home injection  Last dose 3/17/2021   cholecalciferol (VITAMIN D3) 1,000 units tablet  Self Yes No   Sig: Take 2,000 Units by mouth daily   ferrous sulfate (IRON SUPPLEMENT) 325 (65 Fe) mg tablet  Self Yes No   Sig: Take 325 mg by mouth daily with breakfast   ibuprofen (MOTRIN) 800 mg tablet   No No   Sig: Take 1 tablet (800 mg total) by mouth 3 (three) times a day   levothyroxine 100 mcg tablet  Self Yes No   Sig: Take 100 mcg by mouth daily   pramipexole (MIRAPEX) 0 5 mg tablet  Self Yes No   Sig: Take 0 5 mg by mouth 2 (two) times a day    topiramate (TOPAMAX) 25 mg sprinkle capsule  Self Yes No   Sig: Take 25 mg by mouth daily in the early morning   topiramate (TOPAMAX) 50 MG tablet  Self Yes No   Sig: Take 50 mg by mouth 2 (two) times a day       Facility-Administered Medications: None       Past Medical History:   Diagnosis Date    Arthritis     Bulging of cervical intervertebral disc     Celiac disease     Crohn disease ([address] )     Disease of thyroid gland     [facility]     Migraines     Neck pain, chronic     Polycystic ovarian syndrome     PONV (postoperative nausea and vomiting)     TMJ (temporomandibular joint syndrome)     Wears glasses        Past Surgical History:   Procedure Laterality Date    ADENOIDECTOMY      ANKLE SURGERY Left     reconstruction     SECTION      x2    DIAGNOSTIC LAPAROSCOPY      HYSTERECTOMY      CA ARTHRODESIS ANT INTERBODY INC DISCECTOMY, CERVICAL BELOW C2 Bilateral 2019    Procedure: C5/6 ANTERIOR CERVICAL DECOMPRESSION AND FUSION;  Surgeon: Nasra Riggs MD;  Location: [facility];  Service: Neurosurgery    CA KNEE SCOPE,MED/LAT MENISECTOMY Left 2021    Procedure: KNEE ARTHROSCOPY, medial and lateral meniscectomy, chondrooplasty, synovectomy, injection;  Surgeon: Eugenio Junior DO;  Location: [facility] MAIN OR;  Service: Orthopedics    TONSILLECTOMY      TUBAL LIGATION         Family History   Adopted: Yes   Family history unknown: Yes     I have reviewed and agree with the history as documented      E-Cigarette/Vaping    E-Cigarette Use Never User      E-Cigarette/Vaping Substances    Nicotine No     THC No     CBD No     Flavoring No     Other No     Unknown No      Social History     Tobacco Use    Smoking status: Current Every Day Smoker     Packs/day: 1 00     Types: Cigarettes    Smokeless tobacco: Never Used    Tobacco comment: she's "working on it"   Vaping Use    Vaping Use: Never used   Substance Use Topics    Alcohol use: Never    Drug use: Never       Review of Systems   Constitutional: Negative for chills and fever  HENT: Negative for congestion, nosebleeds, rhinorrhea and sore throat  Eyes: Negative for pain and visual disturbance  Respiratory: Negative for cough and wheezing  Cardiovascular: Negative for chest pain and leg swelling  Gastrointestinal: Negative for abdominal distention, abdominal pain, diarrhea, nausea and vomiting  Genitourinary: Negative for dysuria and frequency  Musculoskeletal: Negative for back pain and joint swelling  Skin: Negative for rash and wound  Neurological: Negative for weakness and numbness  Psychiatric/Behavioral: Negative for decreased concentration and suicidal ideas  Physical Exam  Physical Exam  Vitals and nursing note reviewed  Constitutional:       Appearance: She is normal weight  HENT:      Head: Normocephalic and atraumatic  Eyes:      Conjunctiva/sclera: Conjunctivae normal       Pupils: Pupils are equal, round, and reactive to light  Cardiovascular:      Rate and Rhythm: Normal rate and regular rhythm  Pulmonary:      Effort: Pulmonary effort is normal  No respiratory distress  Abdominal:      General: Abdomen is flat  There is no distension  Musculoskeletal:         General: No swelling or deformity  Cervical back: Normal range of motion and neck supple  Comments: Erythematous, tender superficial veins with mild calf swelling   Skin:     General: Skin is dry  Coloration: Skin is not jaundiced  Neurological:      General: No focal deficit present  Mental Status: She is alert and oriented to person, place, and time  Psychiatric:         Mood and Affect: Mood normal          Thought Content:  Thought content normal          Vital Signs  ED Triage Vitals [09/05/21 0618]   Temperature Pulse Respirations Blood Pressure SpO2   (!) 97 °F (36 1 °C) 58 17 126/59 100 % Temp Source Heart Rate Source Patient Position - Orthostatic VS BP Location FiO2 (%)   Temporal Monitor Lying Left arm --      Pain Score       8           Vitals:    09/05/21 0618   BP: 126/59   Pulse: 58   Patient Position - Orthostatic VS: Lying         Visual Acuity      ED Medications  Medications   oxyCODONE-acetaminophen (PERCOCET) 5-325 mg per tablet 1 tablet (1 tablet Oral Given 9/5/21 0651)       Diagnostic Studies  Results Reviewed     Procedure Component Value Units Date/Time    Basic metabolic panel [[id number]] Collected: 09/05/21 0658    Lab Status: In process Specimen: Blood from Arm, Right Updated: 09/05/21 0703    CBC and differential [[id number]] Collected: 09/05/21 0658    Lab Status: In process Specimen: Blood from Arm, Right Updated: 09/05/21 Sherie Kline [[id number]] Collected: 09/05/21 0658    Lab Status: In process Specimen: Blood from Arm, Right Updated: 09/05/21 0702                 VAS lower limb venous duplex study, unilateral/limited    (Results Pending)              Procedures  Procedures         ED Course                                           MDM  Number of Diagnoses or Management Options  Diagnosis management comments: Very pleasant 62-year-old female with signs symptoms concerning for DVT versus superficial phlebitis  Her symptoms have been worsening since her prior visit  Will get a vascular study    Her Eliquis was discontinued as a trial" and it seems that this trial is going per orally as she developed the phlebitis which is now worsening  In my opinion it is only a matter time until this developed into deep vein thrombosis  Patient believes that she would benefit from resuming her anticoagulation and I agree with this      Will get a vascular study to investigate the true depth of this clot, but plan on discharge on Eliquis with hematology follow-up    Patient signed out to oncoming physician Dr Rosamaria Chaudhary  Will also get basic blood work to assess renal function, coags       Amount and/or Complexity of Data Reviewed  Decide to obtain previous medical records or to obtain history from someone other than the patient: yes  Review and summarize past medical records: yes    Risk of Complications, Morbidity, and/or Mortality  Presenting problems: high  Diagnostic procedures: minimal  Management options: moderate        Disposition  Final diagnoses:   None     ED Disposition     None      Follow-up Information    None         Patient's Medications   Discharge Prescriptions    No medications on file     No discharge procedures on file      PDMP Review       Value Time User    PDMP Reviewed  Yes 5/5/2021  1:07 PM Dione Lerner PA-C          ED Provider  Electronically Signed by           Nina Patel DO  09/05/21 8306

## 2021-09-10 ENCOUNTER — OFFICE VISIT (OUTPATIENT)
Dept: VASCULAR SURGERY | Facility: CLINIC | Age: 51
End: 2021-09-10
Payer: COMMERCIAL

## 2021-09-10 VITALS
DIASTOLIC BLOOD PRESSURE: 66 MMHG | TEMPERATURE: 97.1 F | WEIGHT: 266.8 LBS | BODY MASS INDEX: 39.52 KG/M2 | HEART RATE: 61 BPM | HEIGHT: 69 IN | SYSTOLIC BLOOD PRESSURE: 98 MMHG

## 2021-09-10 DIAGNOSIS — I83.893 SYMPTOMATIC VARICOSE VEINS OF BOTH LOWER EXTREMITIES: Primary | ICD-10-CM

## 2021-09-10 DIAGNOSIS — I80.02 THROMBOPHLEBITIS OF SUPERFICIAL VEINS OF LEFT LOWER EXTREMITY: ICD-10-CM

## 2021-09-10 DIAGNOSIS — Z72.0 TOBACCO USE: ICD-10-CM

## 2021-09-10 PROBLEM — K50.811 CROHN'S DISEASE OF BOTH SMALL AND LARGE INTESTINE WITH RECTAL BLEEDING (HCC): Status: ACTIVE | Noted: 2020-04-14

## 2021-09-10 PROBLEM — E28.2 POLYCYSTIC OVARY SYNDROME: Status: ACTIVE | Noted: 2018-02-26

## 2021-09-10 PROBLEM — E03.9 HYPOTHYROID: Status: ACTIVE | Noted: 2018-02-26

## 2021-09-10 PROCEDURE — 99214 OFFICE O/P EST MOD 30 MIN: CPT | Performed by: PHYSICIAN ASSISTANT

## 2021-09-10 RX ORDER — TOPIRAMATE 50 MG/1
50 TABLET, FILM COATED ORAL EVERY 12 HOURS SCHEDULED
COMMUNITY

## 2021-09-10 RX ORDER — TRAMADOL HYDROCHLORIDE 50 MG/1
50 TABLET ORAL EVERY 8 HOURS PRN
COMMUNITY
Start: 2021-09-01 | End: 2022-09-01

## 2021-09-10 RX ORDER — ADALIMUMAB 80MG/0.8ML
KIT SUBCUTANEOUS
COMMUNITY
Start: 2021-08-31

## 2021-09-10 NOTE — PATIENT INSTRUCTIONS
Superficial Thrombophlebitis   WHAT YOU NEED TO KNOW:   What is superficial thrombophlebitis (STP)? STP is inflammation of a vein just under your skin (superficial vein)  The inflammation causes a blood clot to form in your vein  STP most often happens in your leg but may also happen in your arm  What increases my risk for STP? · A condition that affects your blood vessels, such as varicose veins    · A long-term IV catheter    · Recent surgery    · Multiple IV injections or IV drug abuse    · Obesity, pregnancy, or cancer    · Limited activity caused by bed rest, a leg cast, or sitting for long periods    · A blood disorder that makes your blood clot faster than normal, such as factor V Leiden mutation    · In women, hormone replacement therapy or birth control pills    What are the signs and symptoms of STP? You may see a red line on your skin that covers the vein  You may also have swelling and pain near the vein  You may have a fever if infection has spread from your vein to others places in your body  How is STP diagnosed? Your healthcare provider will examine you  You may need any of the following:  · Blood tests  may be done to check for infection and test how fast your blood clots  · Doppler ultrasound  uses sound waves to check for blood clots or damage to your vein  How is STP treated? · Medicines  may be given to treat an infection and decrease swelling and pain  Medicine may also be given to prevent more blood clots  · Removal of an IV catheter  may be needed if your IV is infected  · Surgery  may be needed to remove the blood clot or part of your vein  Surgery may also be needed to remove a collection of infected fluid from your vein  What can I do to manage STP? · Apply a warm compress to your arm or leg  This will help decrease swelling and pain  Wet a washcloth in warm water  Do not  use hot water  Apply the warm compress for 10 minutes  Repeat this 4 times each day  · Wear pressure stockings as directed  Pressure stockings improve blood flow and help prevent clots in your legs  Wear the stockings during the day  Do not wear them when you sleep  · Elevate your leg or arm above the level of your heart as often as you can  This will help decrease swelling and pain  Prop your leg or arm on pillows or blankets to keep it elevated comfortably  What can I do to prevent STP? · Maintain a healthy weight  This will help decrease your risk for another blood clot  Ask your healthcare provider how much you should weigh  Ask him or her to help you create a weight loss plan if you are overweight  · Do not smoke  Nicotine and other chemicals in cigarettes and cigars can damage blood vessels and increase your risk for blood clots  Ask your healthcare provider for information if you currently smoke and need help to quit  E-cigarettes or smokeless tobacco still contain nicotine  Talk to your healthcare provider before you use these products  · Change your body position or move around often  Move and stretch in your seat several times each hour if you travel by car or work at a desk  In an airplane, get up and walk every hour  Move your legs by tightening and releasing your leg muscles while sitting  You can move your legs while sitting by raising and lowering your heels  Keep your toes on the floor while you do this  You can also raise and lower your toes while keeping your heels on the floor  · Exercise regularly to help increase your blood flow  Walking is a good low-impact exercise  Talk to your healthcare provider about the best exercise plan for you  · Do not inject illegal drugs  Talk to your healthcare provider if you use IV drugs and need help to quit  Call your local emergency number (911 in the [address]) if:   · You feel lightheaded, short of breath, and have chest pain  · You cough up blood  When should I call my doctor?    · Your arm or leg feels warm, tender, and painful  It may look swollen and red  · You have questions or concerns about your condition or care  CARE AGREEMENT:   You have the right to help plan your care  Learn about your health condition and how it may be treated  Discuss treatment options with your healthcare providers to decide what care you want to receive  You always have the right to refuse treatment  The above information is an  only  It is not intended as medical advice for individual conditions or treatments  Talk to your doctor, nurse or pharmacist before following any medical regimen to see if it is safe and effective for you  © Copyright [address] Information is for End User's use only and may not be sold, redistributed or otherwise used for commercial purposes  All illustrations and images included in CareNotes® are the copyrighted property of [employer]  or [address] [place] [address]    -recent ultrasound demonstrates blood clot in a superficial vein of the leg  No evidence of  DVT  -recommend conservative management with elevation of extremity, compression, warm compresses to affected area and NSAIDs as needed for symptomatic relief   -continue anticoagulation therapy with Eliquis  Recommend lifelong anticoagulation therapy given recurrent VTE   -f/u w/Hematology for recommendations regarding long-term and perioperative management of anticoagulation therapy  -continue conservative measures to include daily use of compression stockings, lower extremity elevation, low-sodium diet, weight management and skin moisturization  -there is a direct causal association with smoking and blood clots  Is imperative that you quit smoking, especially given your history of recurrent blood clots    -will repeat venous reflux study in 3 months for reassessment of underlying venous disease and to assess for resolution of superficial venous phlebitis  -return to office with surgeon in 3 months with repeat venous reflux study   -instructed to contact the office with new symptoms or concerns

## 2021-09-10 NOTE — ASSESSMENT & PLAN NOTE
-discussed the pathophysiology and relationship of smoking and venous thromboembolism  -encourage smoking cessation

## 2021-09-10 NOTE — PROGRESS NOTES
Assessment/Plan:    Symptomatic varicose veins of both lower extremities  68-year-old female smoker with Crohn's disease, hypothyroidism, MIROSLAVA, PCOS, recurrent DVT w/provoked L calf DVT '11 and unprovoked nonocclusive L peroneal DVT 8/26/2020, negative hypercoagulable workup, recent L SSV and anterolateral calf varicose vein SVT 8/31/21 and symptomatic BLE truncal varicosities w/deep and superficial venous incompetence presents for 6 month f/u for possible R GSV EVLT and re-evaluation due to recent LLE SVT  -LEVDR 12/18/2020 reviewed with deep venous incompetence of L FV and bilat PV and superficial venous incompetence of R GSV and L SSV     -plan per Dr Bard Allen for R GSV EVLT after recovery from L peroneal DVT and cleared by Hematology  Pt developed LLE SVT in the interim  Acute LLE symptoms resolving   -previously evaluated by Hematology ([facility]) with negative hypercoagulable workup  Eliquis discontinued 3 months ago at pt request despite risk of recurrent VTE  -Eliquis restarted due to LLE SVT 8/31/2021  -continue anticoagulation therapy with Eliquis  Recommend lifelong anticoagulation therapy given recurrent VTE   -f/u w/Hematology for recommendations regarding long-term and perioperative management of anticoagulation therapy  -continue conservative measures to include daily use of compression stockings, lower extremity elevation, low-sodium diet, weight management and skin moisturization  -discussed the pathophysiology of venous thromboembolism and and direct causal association with smoking  Encourage smoking cessation    -will obtain repeat LEVDR in 3 months for reassessment of underlying venous disease and resolution of SVT  -return to office with surgeon in 3 months with repeat LEVDR   -instructed to contact the office with new symptoms or concerns    Thrombophlebitis of superficial veins of left lower extremity  Acute superficial thrombophlebitis of mid/proximal SSV and anterolateral calf varicose veins 8/31/2021   -LLE symptoms markedly improved  -continue compression, elevation and warm compresses to affected area   -previously evaluated by Hematology ([facility]) with negative hypercoagulable workup  Eliquis discontinued 3 months ago at pt request despite risk of recurrent VTE  -Eliquis restarted due to LLE SVT 8/31/2021  -continue anticoagulation therapy with Eliquis  Recommend lifelong anticoagulation therapy given recurrent VTE   -f/u w/Hematology for recommendations regarding long-term and perioperative management of anticoagulation therapy  -smoking cessation   -see plan as outlined above    Tobacco use  -discussed the pathophysiology and relationship of smoking and venous thromboembolism  -encourage smoking cessation       Diagnoses and all orders for this visit:    Symptomatic varicose veins of both lower extremities  -     VAS Lower extremity venous insufficiency duplex, bilateral w/ measurements; Future    Thrombophlebitis of superficial veins of left lower extremity  -     VAS Lower extremity venous insufficiency duplex, bilateral w/ measurements; Future    Tobacco use    Other orders  -     Humira Pen-CD/UC/HS Starter 80 MG/0 8ML PNKT  -     traMADol (ULTRAM) 50 mg tablet; Take 50 mg by mouth every 8 (eight) hours as needed  -     topiramate (TOPAMAX) 50 MG tablet; Take 50 mg by mouth every 12 (twelve) hours          Subjective:      Patient ID: Kae Hector is a 54 y o  female  Pt is here today to review results of LEV done 9/5/2021  She has had pain redness and swelling in her left calf since 8/27/2021  She said that the veins in her calf felt hard  Pt was seen at the ED on 8/31/2021 and 9/5/2021  She was told to use warm,moist compresses  Pt is taking Eliquis  Pt is a current smoker       53-year-old female smoker with Crohn's disease, hypothyroidism, MIROSLAVA, PCOS, recurrent DVT w/provoked L calf DVT '11 and unprovoked nonocclusive L peroneal DVT 8/26/2020, negative hypercoagulable workup, recent L SSV and anterolateral calf varicose vein SVT 8/31/21 and symptomatic BLE truncal varicosities w/deep and superficial venous incompetence presents for 6 month f/u for possible R GSV EVLT and re-evaluation due to recent LLE SVT  LEVDR 12/18/2020 reviewed with deep venous incompetence of L FV and bilat PV and superficial venous incompetence of R GSV and L SSV  Patient last evaluated in the office by Dr Teddy Crockett on 3/9/2021  At that time, plan per Dr Teddy Crockett was for R GSV EVLT after recovery from L peroneal DVT and cleared by Hematology  Pt developed LLE SVT in the interim  Patient reports markedly improved left leg symptoms  Pain over the thrombosed varicosities is significantly decreased and no skin changes  Continues to have complaints of bilateral lower extremity heaviness, aching, burning, edema and pain related to her underlying venous incompetence  She continues to wear compression which does not relieved her burning symptoms  Patient continues to be interested in undergoing venous intervention  Previously evaluated by Hematology ([facility]) with negative hypercoagulable workup  Eliquis discontinued 3 months ago at pt request despite risk of recurrent VTE  Eliquis restarted due to LLE SVT 8/31/2021  The following portions of the patient's history were reviewed and updated as appropriate: allergies, current medications, past family history, past medical history, past social history, past surgical history and problem list     Review of Systems   Constitutional: Positive for fatigue  HENT: Negative  Eyes: Negative  Respiratory: Negative  Cardiovascular: Positive for leg swelling  Painful veins   Gastrointestinal: Positive for diarrhea  Endocrine: Negative  Genitourinary: Negative  Musculoskeletal: Positive for arthralgias and back pain  Leg pain   Skin: Negative  Neurological: Positive for headaches  Hematological: Negative      Psychiatric/Behavioral: The patient is nervous/anxious  I have reviewed and made appropriate changes to the review of systems input by the medical assistant      Vitals:    09/10/21 1355   BP: 98/66   BP Location: Left arm   Patient Position: Sitting   Cuff Size: Large   Pulse: 61   Temp: (!) 97 1 °F (36 2 °C)   TempSrc: Tympanic   Weight: 121 kg (266 lb 12 8 oz)   Height: 5' 9" (1 753 m)       Patient Active Problem List   Diagnosis    Cervical spinal stenosis    Radiculopathy, cervical    RLQ abdominal tenderness    Acute deep vein thrombosis (DVT) of left peroneal vein (HCC)    History of meniscal tear    Tear of lateral meniscus of left knee, current    Symptomatic varicose veins of both lower extremities    Thrombophlebitis of superficial veins of left lower extremity    Crohn's disease of both small and large intestine with rectal bleeding (HCC)    Hypothyroid    Polycystic ovary syndrome    Tobacco use       Past Surgical History:   Procedure Laterality Date    ADENOIDECTOMY      ANKLE SURGERY Left     reconstruction     SECTION      x2    DIAGNOSTIC LAPAROSCOPY      HYSTERECTOMY      AK ARTHRODESIS ANT INTERBODY INC DISCECTOMY, CERVICAL BELOW C2 Bilateral 2019    Procedure: C5/6 ANTERIOR CERVICAL DECOMPRESSION AND FUSION;  Surgeon: Ankush Keating MD;  [facility];  Service: Neurosurgery    AK KNEE SCOPE,MED/LAT MENISECTOMY Left 2021    Procedure: KNEE ARTHROSCOPY, medial and lateral meniscectomy, chondrooplasty, synovectomy, injection;  Surgeon: Seda Blanca DO;  Location: [address] [place] [address] MAIN OR;  Service: Orthopedics    TONSILLECTOMY      TUBAL LIGATION         Family History   Adopted: Yes   Family history unknown: Yes       Social History     Socioeconomic History    Marital status: /Civil Union     Spouse name: Not on file    Number of children: Not on file    Years of education: Not on file    Highest education level: Not on file   Occupational History    Not on file   Tobacco Use    Smoking status: Current Every Day Smoker     Packs/day: 1 00     Types: Cigarettes    Smokeless tobacco: Never Used    Tobacco comment: she's "working on it"   Vaping Use    Vaping Use: Never used   Substance and Sexual Activity    Alcohol use: Never    Drug use: Never    Sexual activity: Not Currently   Other Topics Concern    Not on file   Social History Narrative    Not on file     Social Determinants of Health     Financial Resource Strain:     Difficulty of Paying Living Expenses:    Food Insecurity:     Worried About Running Out of Food in the Last Year:     [address] in the Last Year:    Transportation Needs:     Lack of Transportation (Medical):  Lack of Transportation (Non-Medical):    Physical Activity:     Days of Exercise per Week:     Minutes of Exercise per Session:    Stress:     Feeling of Stress :    Social Connections:     Frequency of Communication with Friends and Family:     Frequency of Social Gatherings with Friends and Family:     Attends [employer] Services:     Active Member of Clubs or Organizations:     Attends Club or Organization Meetings:     Marital Status:    Intimate Partner Violence:     Fear of Current or Ex-Partner:     Emotionally Abused:     Physically Abused:     Sexually Abused: Allergies   Allergen Reactions    Amoxicillin Rash    Codeine Rash and Other (See Comments)     Rash    Diclofenac Rash    Penicillins Other (See Comments)     Rash    Sulfa Antibiotics Other (See Comments) and Hives     Rash  Other reaction(s): Other (See Comments)  Rash    Diclofenac Sodium Rash    Etodolac Hives, Rash and Other (See Comments)     Rash           Current Outpatient Medications:     apixaban (ELIQUIS) 5 mg, Take 1 tablet (5 mg total) by mouth 2 (two) times a day for 7 days, THEN 1 tablet (5 mg total) 2 (two) times a day for 23 days  , Disp: 60 tablet, Rfl: 0    cholecalciferol (VITAMIN D3) 1,000 units tablet, Take 2,000 Units by mouth daily, Disp: , Rfl:     Cyanocobalamin (VITAMIN B 12 PO), Take 1 tablet by mouth daily , Disp: , Rfl:     ferrous sulfate (IRON SUPPLEMENT) 325 (65 Fe) mg tablet, Take 325 mg by mouth daily with breakfast, Disp: , Rfl:     Humira Pen-CD/UC/HS Starter 80 MG/0 8ML PNKT, , Disp: , Rfl:     ibuprofen (MOTRIN) 800 mg tablet, Take 1 tablet (800 mg total) by mouth 3 (three) times a day, Disp: 21 tablet, Rfl: 0    levothyroxine 100 mcg tablet, Take 100 mcg by mouth daily, Disp: , Rfl:     pramipexole (MIRAPEX) 0 5 mg tablet, Take 0 5 mg by mouth 2 (two) times a day , Disp: , Rfl:     topiramate (TOPAMAX) 50 MG tablet, Take 50 mg by mouth every 12 (twelve) hours, Disp: , Rfl:     traMADol (ULTRAM) 50 mg tablet, Take 50 mg by mouth every 8 (eight) hours as needed, Disp: , Rfl:     Ustekinumab (STELARA IV), Infuse into a venous catheter every 56 days Every 8 weeks; home injection Last dose 3/17/2021, Disp: , Rfl:     Objective:  Imaging studies:  L EVD 8/31/2021 and 9/5/2021 reviewed and as noted above  BP 98/66 (BP Location: Left arm, Patient Position: Sitting, Cuff Size: Large)   Pulse 61   Temp (!) 97 1 °F (36 2 °C) (Tympanic)   Ht 5' 9" (1 753 m)   Wt 121 kg (266 lb 12 8 oz)   BMI 39 40 kg/m²          Physical Exam  Vitals and nursing note reviewed  Constitutional:       General: She is not in acute distress  Appearance: She is well-developed  She is obese  HENT:      Head: Normocephalic and atraumatic  Eyes:      General: No scleral icterus  Conjunctiva/sclera: Conjunctivae normal       Pupils: Pupils are equal, round, and reactive to light  Neck:      Thyroid: No thyromegaly  Vascular: No carotid bruit or JVD  Trachea: No tracheal deviation  Cardiovascular:      Rate and Rhythm: Normal rate and regular rhythm  Pulses: Normal pulses  Carotid pulses are 2+ on the right side and 2+ on the left side  Radial pulses are 2+ on the right side and 2+ on the left side  Dorsalis pedis pulses are 2+ on the right side and 2+ on the left side  Heart sounds: S1 normal and S2 normal  No murmur heard  No friction rub  No gallop  No S3 sounds  Comments: Bilateral lower extremities warm, pink, motor and sensory intact and well perfused without cyanosis, pallor, rubor, lipodermatosclerosis  Mild hemosiderin staining bilateral lower extremities  No venous ulcerations  Significant truncal varicosities on anterior thighs, lateral thighs and anterior lateral calf  Palpable cord over thrombosed varicosities left anterior lateral proximal shin  Minimally tender  No erythema  Pulmonary:      Effort: No respiratory distress  Breath sounds: Normal breath sounds  No stridor  No wheezing, rhonchi or rales  Abdominal:      General: Bowel sounds are normal  There is no distension or abdominal bruit  Palpations: Abdomen is soft  There is no mass or pulsatile mass  Tenderness: There is no abdominal tenderness  There is no rebound  Musculoskeletal:         General: No deformity  Normal range of motion  Cervical back: Normal range of motion and neck supple  Right lower le+ Edema present  Left lower le+ Edema present  Skin:     General: Skin is warm and dry  Coloration: Skin is not pale  Findings: No erythema or lesion  Neurological:      General: No focal deficit present  Mental Status: She is alert and oriented to person, place, and time  Psychiatric:         Mood and Affect: Mood normal          Thought Content:  Thought content normal

## 2021-09-10 NOTE — ASSESSMENT & PLAN NOTE
73-year-old female smoker with Crohn's disease, hypothyroidism, MIROSLAVA, PCOS, recurrent DVT w/provoked L calf DVT '11 and unprovoked nonocclusive L peroneal DVT 8/26/2020, negative hypercoagulable workup, recent L SSV and anterolateral calf varicose vein SVT 8/31/21 and symptomatic BLE truncal varicosities w/deep and superficial venous incompetence presents for 6 month f/u for possible R GSV EVLT and re-evaluation due to recent LLE SVT  -LEVDR 12/18/2020 reviewed with deep venous incompetence of L FV and bilat PV and superficial venous incompetence of R GSV and L SSV     -plan per Dr Carol Marr for R GSV EVLT after recovery from L peroneal DVT and cleared by Hematology  Pt developed LLE SVT in the interim  Acute LLE symptoms resolving   -previously evaluated by Hematology ([facility]) with negative hypercoagulable workup  Eliquis discontinued 3 months ago at pt request despite risk of recurrent VTE  -Eliquis restarted due to LLE SVT 8/31/2021  -continue anticoagulation therapy with Eliquis  Recommend lifelong anticoagulation therapy given recurrent VTE   -f/u w/Hematology for recommendations regarding long-term and perioperative management of anticoagulation therapy  -continue conservative measures to include daily use of compression stockings, lower extremity elevation, low-sodium diet, weight management and skin moisturization  -discussed the pathophysiology of venous thromboembolism and and direct causal association with smoking  Encourage smoking cessation    -will obtain repeat LEVDR in 3 months for reassessment of underlying venous disease and resolution of SVT  -return to office with surgeon in 3 months with repeat LEVDR   -instructed to contact the office with new symptoms or concerns

## 2021-09-10 NOTE — ASSESSMENT & PLAN NOTE
Acute superficial thrombophlebitis of mid/proximal SSV and anterolateral calf varicose veins 8/31/2021   -LLE symptoms markedly improved  -continue compression, elevation and warm compresses to affected area   -previously evaluated by Hematology ([facility]) with negative hypercoagulable workup  Eliquis discontinued 3 months ago at pt request despite risk of recurrent VTE  -Eliquis restarted due to LLE SVT 8/31/2021  -continue anticoagulation therapy with Eliquis    Recommend lifelong anticoagulation therapy given recurrent VTE   -f/u w/Hematology for recommendations regarding long-term and perioperative management of anticoagulation therapy  -smoking cessation   -see plan as outlined above

## 2022-08-17 ENCOUNTER — OFFICE VISIT (OUTPATIENT)
Dept: URGENT CARE | Age: 52
End: 2022-08-17
Payer: COMMERCIAL

## 2022-08-17 VITALS
RESPIRATION RATE: 17 BRPM | HEART RATE: 75 BPM | TEMPERATURE: 99 F | OXYGEN SATURATION: 99 % | BODY MASS INDEX: 41.23 KG/M2 | WEIGHT: 288 LBS | HEIGHT: 70 IN

## 2022-08-17 DIAGNOSIS — R05.1 ACUTE COUGH: Primary | ICD-10-CM

## 2022-08-17 DIAGNOSIS — U07.1 COVID: ICD-10-CM

## 2022-08-17 LAB
SARS-COV-2 AG UPPER RESP QL IA: POSITIVE
VALID CONTROL: ABNORMAL

## 2022-08-17 PROCEDURE — 99213 OFFICE O/P EST LOW 20 MIN: CPT

## 2022-08-17 PROCEDURE — 87811 SARS-COV-2 COVID19 W/OPTIC: CPT

## 2022-08-17 NOTE — LETTER
August 17, 2022     Patient: Rut Manrique   YOB: 1970   Date of Visit: 8/17/2022       To Whom it May Concern:    Rut Manrique was seen in my clinic on 8/17/2022  She may return to work on 8/22/2022       If you have any questions or concerns, please don't hesitate to call           Sincerely,          HELEN Patel        CC: No Recipients

## 2022-08-23 NOTE — PROGRESS NOTES
[address] [facility]        NAME: Logan Enamorado is a 50 y o  female  : 1970    MRN: [id number]  DATE: 2022  TIME: 4:57 PM    Assessment and Plan   Acute cough [R05 1]  1  Acute cough  Poct Covid 19 Rapid Antigen Test   2  COVID       Patient presents with complaints of cough, sore throat, congestion and HA  Nausea at times  Family members are also sick at home with [facility]  Requesting a test      POCT COVID positive  Breath sounds clear, congestion present  Discussed symptom management  F/U with PCP as needed  Patient Instructions       Follow up with PCP as needed    Chief Complaint     Chief Complaint   Patient presents with    Cold Like Symptoms     Awoke w/ nasal congestion, dry throat, H/A and nausea, vomited at 0900  Nausea remains  Family members sick  History of Present Illness       Patient presents with complaints of cough, sore throat, congestion and HA  Nausea at times  Family members are also sick at home with [facility]  Requesting a test      POCT COVID positive  Breath sounds clear, congestion present  Discussed symptom management  F/U with PCP as needed  Review of Systems   Review of Systems   Constitutional: Negative for chills and fever  HENT: Positive for congestion and sore throat  Negative for ear pain, postnasal drip and sinus pain  Eyes: Negative for pain and itching  Respiratory: Positive for cough  Negative for shortness of breath and wheezing  Cardiovascular: Negative for chest pain and palpitations  Gastrointestinal: Positive for nausea  Negative for abdominal pain, constipation, diarrhea and vomiting  Genitourinary: Negative for difficulty urinating and hematuria  Musculoskeletal: Negative for arthralgias and myalgias  Skin: Negative for rash  Neurological: Negative for dizziness, light-headedness and headaches  Psychiatric/Behavioral: Negative for agitation and sleep disturbance  The patient is not nervous/anxious            Current Medications       Current Outpatient Medications:     cholecalciferol (VITAMIN D3) 1,000 units tablet, Take 2,000 Units by mouth daily, Disp: , Rfl:     Cyanocobalamin (VITAMIN B 12 PO), Take 1 tablet by mouth daily , Disp: , Rfl:     ferrous sulfate 325 (65 Fe) mg tablet, Take 325 mg by mouth daily with breakfast, Disp: , Rfl:     Humira Pen-CD/UC/HS Starter 80 MG/0 8ML PNKT, , Disp: , Rfl:     ibuprofen (MOTRIN) 800 mg tablet, Take 1 tablet (800 mg total) by mouth 3 (three) times a day, Disp: 21 tablet, Rfl: 0    levothyroxine 100 mcg tablet, Take 100 mcg by mouth daily, Disp: , Rfl:     pramipexole (MIRAPEX) 0 5 mg tablet, Take 0 5 mg by mouth 2 (two) times a day , Disp: , Rfl:     topiramate (TOPAMAX) 50 MG tablet, Take 50 mg by mouth every 12 (twelve) hours, Disp: , Rfl:     apixaban (ELIQUIS) 5 mg, Take 1 tablet (5 mg total) by mouth 2 (two) times a day for 7 days, THEN 1 tablet (5 mg total) 2 (two) times a day for 23 days   (Patient taking differently: Take 1 tablet (5 mg total) by mouth 2 (two) times a day for 7 days, THEN 1 tablet (5 mg total) 2 (two) times a day for), Disp: 60 tablet, Rfl: 0    traMADol (ULTRAM) 50 mg tablet, Take 50 mg by mouth every 8 (eight) hours as needed (Patient not taking: Reported on 8/17/2022), Disp: , Rfl:     Ustekinumab (STELARA IV), Infuse into a venous catheter every 56 days Every 8 weeks; home injection Last dose 3/17/2021 (Patient not taking: Reported on 8/17/2022), Disp: , Rfl:     Current Allergies     Allergies as of 08/17/2022 - Reviewed 08/17/2022   Allergen Reaction Noted    Amoxicillin Rash 05/05/2018    Codeine Rash and Other (See Comments) 05/05/2018    Diclofenac Rash 04/24/2019    Penicillins Other (See Comments) 05/05/2018    Sulfa antibiotics Other (See Comments) and Hives 01/01/2018    Diclofenac sodium Rash 04/08/2019    Lamictal [lamotrigine] Hives 08/17/2022    Etodolac Hives, Rash, and Other (See Comments) 06/01/2018            The following portions of the patient's history were reviewed and updated as appropriate: allergies, current medications, past family history, past medical history, past social history, past surgical history and problem list      Past Medical History:   Diagnosis Date    Arthritis     Bulging of cervical intervertebral disc     Celiac disease     Crohn disease ([facility] [address] )     Disease of thyroid gland     DVT (deep venous thrombosis) ([facility] [address] )     Migraines     Neck pain, chronic     Polycystic ovarian syndrome     PONV (postoperative nausea and vomiting)     TMJ (temporomandibular joint syndrome)     Wears glasses        Past Surgical History:   Procedure Laterality Date    ADENOIDECTOMY      ANKLE SURGERY Left     reconstruction     SECTION      x2    DIAGNOSTIC LAPAROSCOPY      HYSTERECTOMY      [place] ARTHRODESIS ANT INTERBODY INC DISCECTOMY, CERVICAL BELOW C2 Bilateral 2019    Procedure: C5/6 ANTERIOR CERVICAL DECOMPRESSION AND FUSION;  Surgeon: Martha Yu MD;  [facility];  Service: Neurosurgery    [place] KNEE SCOPE,MED/LAT MENISECTOMY Left 2021    Procedure: KNEE ARTHROSCOPY, medial and lateral meniscectomy, chondrooplasty, synovectomy, injection;  Surgeon: Beauford Brittle, DO;  Location: [address] [place] [address] MAIN OR;  Service: Orthopedics    TONSILLECTOMY      TUBAL LIGATION         Family History   Adopted: Yes   Family history unknown: Yes         Medications have been verified  Objective   Pulse 75   Temp 99 °F (37 2 °C)   Resp 17   Ht 5' 10" (1 778 m)   Wt 131 kg (288 lb)   SpO2 99%   BMI 41 32 kg/m²   No LMP recorded  Patient has had a hysterectomy  Physical Exam     Physical Exam  Vitals reviewed  Constitutional:       General: She is not in acute distress  Appearance: Normal appearance  HENT:      Head: Normocephalic and atraumatic  Right Ear: Tympanic membrane and ear canal normal       Left Ear: Tympanic membrane and ear canal normal       Nose: Congestion present  Mouth/Throat:      Mouth: Mucous membranes are moist       Pharynx: Posterior oropharyngeal erythema present  No oropharyngeal exudate  Eyes:      Extraocular Movements: Extraocular movements intact  Conjunctiva/sclera: Conjunctivae normal       Pupils: Pupils are equal, round, and reactive to light  Cardiovascular:      Rate and Rhythm: Normal rate and regular rhythm  Pulses: Normal pulses  Heart sounds: Normal heart sounds  No murmur heard  Pulmonary:      Effort: Pulmonary effort is normal  No respiratory distress  Breath sounds: Normal breath sounds  Abdominal:      General: Abdomen is flat  Bowel sounds are normal  There is no distension  Palpations: Abdomen is soft  Tenderness: There is no abdominal tenderness  There is no guarding or rebound  Musculoskeletal:         General: No swelling or tenderness  Normal range of motion  Cervical back: Normal range of motion and neck supple  No tenderness  Lymphadenopathy:      Cervical: No cervical adenopathy  Skin:     General: Skin is warm  Neurological:      General: No focal deficit present  Mental Status: She is alert     Psychiatric:         Mood and Affect: Mood normal          Behavior: Behavior normal          Judgment: Judgment normal

## 2022-11-22 NOTE — PLAN OF CARE
Problem: PAIN - ADULT  Goal: Verbalizes/displays adequate comfort level or baseline comfort level  Description  Interventions:  - Encourage patient to monitor pain and request assistance  - Assess pain using appropriate pain scale  - Administer analgesics based on type and severity of pain and evaluate response  - Implement non-pharmacological measures as appropriate and evaluate response  - Consider cultural and social influences on pain and pain management  - Notify physician/advanced practitioner if interventions unsuccessful or patient reports new pain  Outcome: Progressing     Problem: INFECTION - ADULT  Goal: Absence or prevention of progression during hospitalization  Description  INTERVENTIONS:  - Assess and monitor for signs and symptoms of infection  - Monitor lab/diagnostic results  - Monitor all insertion sites, i e  indwelling lines, tubes, and drains  - Monitor endotracheal if appropriate and nasal secretions for changes in amount and color  - [place] appropriate cooling/warming therapies per order  - Administer medications as ordered  - Instruct and encourage patient and family to use good hand hygiene technique     Outcome: Progressing  Goal: Absence of fever/infection during neutropenic period  Description  INTERVENTIONS:  - Monitor WBC    Outcome: Progressing     Problem: SAFETY ADULT  Goal: Patient will remain free of falls  Description  INTERVENTIONS:  - Assess patient frequently for physical needs  -  Identify cognitive and physical deficits and behaviors that affect risk of falls    -  [place] fall precautions as indicated by assessment   - Educate patient/family on patient safety including physical limitations  - Instruct patient to call for assistance with activity based on assessment  - Modify environment to reduce risk of injury  - Consider OT/PT consult to assist with strengthening/mobility  Outcome: Progressing  Goal: Maintain or return to baseline ADL function  Description  INTERVENTIONS:  -  Assess patient's ability to carry out ADLs; assess patient's baseline for ADL function and identify physical deficits which impact ability to perform ADLs (bathing, care of mouth/teeth, toileting, grooming, dressing, etc )  - Assess/evaluate cause of self-care deficits   - Assess range of motion  - Assess patient's mobility; develop plan if impaired  - Assess patient's need for assistive devices and provide as appropriate  - Encourage maximum independence but intervene and supervise when necessary  - Involve family in performance of ADLs  - Assess for home care needs following discharge   - Consider OT consult to assist with ADL evaluation and planning for discharge  - Provide patient education as appropriate  Outcome: Progressing  Goal: Maintain or return mobility status to optimal level  Description  INTERVENTIONS:  - Assess patient's baseline mobility status (ambulation, transfers, stairs, etc )    - Identify cognitive and physical deficits and behaviors that affect mobility  - Identify mobility aids required to assist with transfers and/or ambulation (gait belt, sit-to-stand, lift, walker, cane, etc )  - [place] fall precautions as indicated by assessment  - Record patient progress and toleration of activity level on Mobility SBAR; progress patient to next Phase/Stage  - Instruct patient to call for assistance with activity based on assessment  - Consider rehabilitation consult to assist with strengthening/weightbearing, etc   Outcome: Progressing     Problem: DISCHARGE PLANNING  Goal: Discharge to home or other facility with appropriate resources  Description  INTERVENTIONS:  - Identify barriers to discharge w/patient and caregiver  - Arrange for needed discharge resources and transportation as appropriate  - Identify discharge learning needs (meds, wound care, etc )    - Refer to Case Management Department for coordinating discharge planning if the patient needs post-hospital services based on physician/advanced practitioner order or complex needs related to functional status, cognitive ability, or social support system   Outcome: Progressing     Problem: Knowledge Deficit  Goal: Patient/family/caregiver demonstrates understanding of disease process, treatment plan, medications, and discharge instructions  Description  Complete learning assessment and assess knowledge base  Interventions:  - Provide teaching at level of understanding  - Provide teaching via preferred learning methods  Outcome: Progressing     Problem: DISCHARGE PLANNING - CARE MANAGEMENT  Goal: Discharge to post-acute care or home with appropriate resources  Description  INTERVENTIONS:  - Conduct assessment to determine patient/family and health care team treatment goals, and need for post-acute services based on payer coverage, community resources, and patient preferences, and barriers to discharge  - Address psychosocial, clinical, and financial barriers to discharge as identified in assessment in conjunction with the patient/family and health care team  - Arrange appropriate level of post-acute services according to patient's   needs and preference and payer coverage in collaboration with the physician and health care team  - Communicate with and update the patient/family, physician, and health care team regarding progress on the discharge plan  - Arrange appropriate transportation to post-acute venues  Pt will be discharged home with [facility] who will transport  Pt will have no needs upon discharge     Outcome: Progressing Yes...

## 2024-01-21 NOTE — PROGRESS NOTES
[facility]    NAME: Jose David Adamson is a 57 y o  female  : 1970    MRN: [id number]  DATE: May 5, 2018  TIME: 10:13 AM    Assessment and Plan   Bilateral otitis media, unspecified otitis media type [H66 93]  1  Bilateral otitis media, unspecified otitis media type  azithromycin (ZITHROMAX) 250 mg tablet    fluconazole (DIFLUCAN) 150 mg tablet       Patient Instructions     Patient Instructions   I have prescribed an antibiotic for the infection  Please take the antibiotic as prescribed and finish the entire prescription  I recommend that the patient takes an over the counter probiotic or eats yogurt with live cultures in it [place]) to keep good bacteria in the gut and help prevent diarrhea  Wash hands frequently to prevent the spread of infection  Can use over the counter cough and cold medications to help with symptoms  Ibuprofen and/or tylenol as needed for pain or fever  If not improving over the next 7-10 days, follow up with PCP  Chief Complaint     Chief Complaint   Patient presents with    Nasal Congestion     x 2 days    Earache    Cold Like Symptoms       History of Present Illness   46-year-old female here with upper respiratory complaints for the last week  Over the last 2 days noticed more pressure and pain in both of her ears  Has a lot of sinus pressure and congestion  Also has a sore throat  No fever chills  Review of Systems   Review of Systems   Constitutional: Negative for activity change, appetite change, chills, diaphoresis, fatigue, fever and unexpected weight change  HENT: Positive for congestion, ear pain, sinus pressure and sore throat  Negative for dental problem, hearing loss, sneezing, tinnitus, trouble swallowing and voice change  Eyes: Negative for photophobia, redness and visual disturbance  Respiratory: Positive for cough  Negative for apnea, chest tightness, shortness of breath, wheezing and stridor      Cardiovascular: Negative for "[facility]  W D AND C AND/OR EVACUATION       Medications:  Current Facility-Administered Medications   Medication    diphenhydrAMINE (BENADRYL) injection 25 mg     Current Outpatient Medications   Medication    capsaicin " chest pain, palpitations and leg swelling  Gastrointestinal: Negative for abdominal distention, abdominal pain, blood in stool, constipation, diarrhea, nausea and vomiting  Endocrine: Negative for cold intolerance, heat intolerance, polydipsia, polyphagia and polyuria  Genitourinary: Negative for difficulty urinating, dysuria, flank pain, frequency, hematuria and urgency  Musculoskeletal: Negative for arthralgias, back pain, gait problem, joint swelling, myalgias, neck pain and neck stiffness  Skin: Negative for pallor, rash and wound  Neurological: Negative for dizziness, tremors, seizures, speech difficulty, weakness and headaches  Hematological: Negative for adenopathy  Does not bruise/bleed easily  Psychiatric/Behavioral: Negative for agitation, confusion, dysphoric mood and sleep disturbance  The patient is not nervous/anxious  All other systems reviewed and are negative  Current Medications     Current Outpatient Prescriptions:     amLODIPine (NORVASC) 5 mg tablet, Take 5 mg by mouth daily, Disp: , Rfl:     levothyroxine 100 mcg tablet, Take 100 mcg by mouth daily, Disp: , Rfl:     pramipexole (MIRAPEX) 0 5 mg tablet, Take 0 25 mg by mouth 3 (three) times a day, Disp: , Rfl:     topiramate (TOPAMAX) 25 mg tablet, Take 25 mg by mouth 2 (two) times a day, Disp: , Rfl:     azithromycin (ZITHROMAX) 250 mg tablet, Take 2 tablets today then 1 tablet daily for 4 days  , Disp: 6 tablet, Rfl: 0    fluconazole (DIFLUCAN) 150 mg tablet, Take 1 tablet (150 mg total) by mouth once for 1 dose, Disp: 2 tablet, Rfl: 0    Current Allergies     Allergies as of 05/05/2018 - Reviewed 05/05/2018   Allergen Reaction Noted    Amoxicillin  05/05/2018    Codeine  05/05/2018    Penicillins  05/05/2018    Sulfa antibiotics  05/05/2018          The following portions of the patient's history were reviewed and updated as appropriate: allergies, current medications, past family history, past medical (ZOSTRIX) 0.025 % external cream    cloNIDine (CATAPRES) 0.1 MG tablet    metoclopramide (REGLAN) 10 MG tablet    nicotine (NICODERM CQ) 14 MG/24HR 24 hr patch    nicotine (NICODERM CQ) 21 MG/24HR 24 hr patch    nicotine (NICODERM CQ) 7 MG/24HR 24 hr patch    omeprazole (PRILOSEC) 20 MG DR capsule    ondansetron (ZOFRAN ODT) 4 MG ODT tab    potassium chloride ER (KLOR-CON M) 20 MEQ CR tablet    prochlorperazine (COMPAZINE) 10 MG tablet       Allergies:  Ceftriaxone, Amoxicillin, Decadron [dexamethasone], and Penicillins    Relevant labs, images, EKGs, Epic and outside hospital (if applicable) charts were reviewed. The findings, diagnosis, plan, and need for follow up were discussed with the patient/family. Nursing notes were reviewed.      Hussein Ash MD  01/21/24 5121     history, past social history, past surgical history and problem list    Past Medical History:   Diagnosis Date    Disease of thyroid gland     Hypertension      No past surgical history on file  No family history on file  Medications have been verified  Objective   /59   Pulse 58   Temp (!) 96 8 °F (36 °C)   Resp 16   SpO2 100%      Physical Exam   Physical Exam   Constitutional: She appears well-developed and well-nourished  No distress  HENT:   Head: Normocephalic  Right Ear: External ear normal  Tympanic membrane is erythematous and bulging  Left Ear: External ear normal  Tympanic membrane is erythematous and bulging  Nose: Mucosal edema present  Mouth/Throat: Posterior oropharyngeal erythema present  No oropharyngeal exudate  Neck: Normal range of motion  Neck supple  Cardiovascular: Normal rate, regular rhythm and normal heart sounds  No murmur heard  Pulmonary/Chest: Effort normal and breath sounds normal  No respiratory distress  She has no wheezes  She has no rales  Abdominal: Soft  Bowel sounds are normal  There is no tenderness  Musculoskeletal: Normal range of motion  Lymphadenopathy:     She has no cervical adenopathy  Skin: Skin is warm  No rash noted

## 2024-09-19 ENCOUNTER — HOSPITAL ENCOUNTER (EMERGENCY)
Facility: HOSPITAL | Age: 54
End: 2024-09-19
Attending: EMERGENCY MEDICINE
Payer: COMMERCIAL

## 2024-09-19 VITALS
OXYGEN SATURATION: 98 % | RESPIRATION RATE: 18 BRPM | TEMPERATURE: 98.3 F | BODY MASS INDEX: 42.7 KG/M2 | SYSTOLIC BLOOD PRESSURE: 118 MMHG | HEART RATE: 64 BPM | WEIGHT: 293 LBS | DIASTOLIC BLOOD PRESSURE: 69 MMHG

## 2024-09-19 DIAGNOSIS — R45.851 DEPRESSION WITH SUICIDAL IDEATION: Primary | ICD-10-CM

## 2024-09-19 DIAGNOSIS — F32.A DEPRESSION WITH SUICIDAL IDEATION: Primary | ICD-10-CM

## 2024-09-19 LAB
ALBUMIN SERPL BCG-MCNC: 4 G/DL (ref 3.5–5)
ALP SERPL-CCNC: 59 U/L (ref 34–104)
ALT SERPL W P-5'-P-CCNC: 9 U/L (ref 7–52)
AMPHETAMINES SERPL QL SCN: NEGATIVE
ANION GAP SERPL CALCULATED.3IONS-SCNC: 4 MMOL/L (ref 4–13)
AST SERPL W P-5'-P-CCNC: 8 U/L (ref 13–39)
ATRIAL RATE: 68 BPM
BACTERIA UR QL AUTO: ABNORMAL /HPF
BARBITURATES UR QL: NEGATIVE
BASOPHILS # BLD AUTO: 0.03 THOUSANDS/ΜL (ref 0–0.1)
BASOPHILS NFR BLD AUTO: 0 % (ref 0–1)
BENZODIAZ UR QL: NEGATIVE
BILIRUB SERPL-MCNC: 0.44 MG/DL (ref 0.2–1)
BILIRUB UR QL STRIP: NEGATIVE
BUN SERPL-MCNC: 15 MG/DL (ref 5–25)
CALCIUM SERPL-MCNC: 9.5 MG/DL (ref 8.4–10.2)
CAOX CRY URNS QL MICRO: ABNORMAL /HPF
CARDIAC TROPONIN I PNL SERPL HS: 6 NG/L
CHLORIDE SERPL-SCNC: 113 MMOL/L (ref 96–108)
CLARITY UR: CLEAR
CO2 SERPL-SCNC: 24 MMOL/L (ref 21–32)
COCAINE UR QL: NEGATIVE
COLOR UR: ABNORMAL
CREAT SERPL-MCNC: 0.91 MG/DL (ref 0.6–1.3)
EOSINOPHIL # BLD AUTO: 0.16 THOUSAND/ΜL (ref 0–0.61)
EOSINOPHIL NFR BLD AUTO: 2 % (ref 0–6)
ERYTHROCYTE [DISTWIDTH] IN BLOOD BY AUTOMATED COUNT: 12.8 % (ref 11.6–15.1)
ETHANOL EXG-MCNC: 0 MG/DL
EXT PREGNANCY TEST URINE: NEGATIVE
EXT. CONTROL: NORMAL
FENTANYL UR QL SCN: NEGATIVE
GFR SERPL CREATININE-BSD FRML MDRD: 72 ML/MIN/1.73SQ M
GLUCOSE SERPL-MCNC: 103 MG/DL (ref 65–140)
GLUCOSE UR STRIP-MCNC: NEGATIVE MG/DL
HCT VFR BLD AUTO: 36.4 % (ref 34.8–46.1)
HGB BLD-MCNC: 11.6 G/DL (ref 11.5–15.4)
HGB UR QL STRIP.AUTO: NEGATIVE
HYDROCODONE UR QL SCN: NEGATIVE
IMM GRANULOCYTES # BLD AUTO: 0.03 THOUSAND/UL (ref 0–0.2)
IMM GRANULOCYTES NFR BLD AUTO: 0 % (ref 0–2)
KETONES UR STRIP-MCNC: NEGATIVE MG/DL
LEUKOCYTE ESTERASE UR QL STRIP: ABNORMAL
LYMPHOCYTES # BLD AUTO: 1.52 THOUSANDS/ΜL (ref 0.6–4.47)
LYMPHOCYTES NFR BLD AUTO: 22 % (ref 14–44)
MCH RBC QN AUTO: 28.4 PG (ref 26.8–34.3)
MCHC RBC AUTO-ENTMCNC: 31.9 G/DL (ref 31.4–37.4)
MCV RBC AUTO: 89 FL (ref 82–98)
METHADONE UR QL: NEGATIVE
MONOCYTES # BLD AUTO: 0.4 THOUSAND/ΜL (ref 0.17–1.22)
MONOCYTES NFR BLD AUTO: 6 % (ref 4–12)
MUCOUS THREADS UR QL AUTO: ABNORMAL
NEUTROPHILS # BLD AUTO: 4.73 THOUSANDS/ΜL (ref 1.85–7.62)
NEUTS SEG NFR BLD AUTO: 70 % (ref 43–75)
NITRITE UR QL STRIP: NEGATIVE
NON-SQ EPI CELLS URNS QL MICRO: ABNORMAL /HPF
NRBC BLD AUTO-RTO: 0 /100 WBCS
OPIATES UR QL SCN: NEGATIVE
OXYCODONE+OXYMORPHONE UR QL SCN: NEGATIVE
P AXIS: 61 DEGREES
PCP UR QL: NEGATIVE
PH UR STRIP.AUTO: 6.5 [PH]
PLATELET # BLD AUTO: 231 THOUSANDS/UL (ref 149–390)
PMV BLD AUTO: 8.8 FL (ref 8.9–12.7)
POTASSIUM SERPL-SCNC: 4.1 MMOL/L (ref 3.5–5.3)
PR INTERVAL: 158 MS
PROT SERPL-MCNC: 6.4 G/DL (ref 6.4–8.4)
PROT UR STRIP-MCNC: NEGATIVE MG/DL
QRS AXIS: 18 DEGREES
QRSD INTERVAL: 96 MS
QT INTERVAL: 402 MS
QTC INTERVAL: 427 MS
RBC # BLD AUTO: 4.09 MILLION/UL (ref 3.81–5.12)
RBC #/AREA URNS AUTO: ABNORMAL /HPF
SODIUM SERPL-SCNC: 141 MMOL/L (ref 135–147)
SP GR UR STRIP.AUTO: 1.02 (ref 1–1.03)
T WAVE AXIS: 52 DEGREES
THC UR QL: NEGATIVE
UROBILINOGEN UR STRIP-ACNC: <2 MG/DL
VENTRICULAR RATE: 68 BPM
WBC # BLD AUTO: 6.87 THOUSAND/UL (ref 4.31–10.16)
WBC #/AREA URNS AUTO: ABNORMAL /HPF

## 2024-09-19 PROCEDURE — 93005 ELECTROCARDIOGRAM TRACING: CPT

## 2024-09-19 PROCEDURE — 81025 URINE PREGNANCY TEST: CPT | Performed by: EMERGENCY MEDICINE

## 2024-09-19 PROCEDURE — 80053 COMPREHEN METABOLIC PANEL: CPT | Performed by: EMERGENCY MEDICINE

## 2024-09-19 PROCEDURE — 81001 URINALYSIS AUTO W/SCOPE: CPT | Performed by: EMERGENCY MEDICINE

## 2024-09-19 PROCEDURE — 99285 EMERGENCY DEPT VISIT HI MDM: CPT | Performed by: EMERGENCY MEDICINE

## 2024-09-19 PROCEDURE — 93010 ELECTROCARDIOGRAM REPORT: CPT | Performed by: INTERNAL MEDICINE

## 2024-09-19 PROCEDURE — 36415 COLL VENOUS BLD VENIPUNCTURE: CPT | Performed by: EMERGENCY MEDICINE

## 2024-09-19 PROCEDURE — 85025 COMPLETE CBC W/AUTO DIFF WBC: CPT | Performed by: EMERGENCY MEDICINE

## 2024-09-19 PROCEDURE — 84484 ASSAY OF TROPONIN QUANT: CPT | Performed by: EMERGENCY MEDICINE

## 2024-09-19 PROCEDURE — 99284 EMERGENCY DEPT VISIT MOD MDM: CPT

## 2024-09-19 PROCEDURE — 80307 DRUG TEST PRSMV CHEM ANLYZR: CPT | Performed by: EMERGENCY MEDICINE

## 2024-09-19 PROCEDURE — 82075 ASSAY OF BREATH ETHANOL: CPT | Performed by: EMERGENCY MEDICINE

## 2024-09-19 RX ORDER — TRAZODONE HYDROCHLORIDE 50 MG/1
50 TABLET, FILM COATED ORAL
COMMUNITY

## 2024-09-19 RX ORDER — HYDROXYZINE HYDROCHLORIDE 25 MG/1
25 TABLET, FILM COATED ORAL EVERY 8 HOURS PRN
COMMUNITY

## 2024-09-19 RX ORDER — LORAZEPAM 1 MG/1
1 TABLET ORAL ONCE
Status: COMPLETED | OUTPATIENT
Start: 2024-09-19 | End: 2024-09-19

## 2024-09-19 RX ORDER — LIDOCAINE 50 MG/G
1 PATCH TOPICAL EVERY 12 HOURS
COMMUNITY

## 2024-09-19 RX ADMIN — LORAZEPAM 1 MG: 1 TABLET ORAL at 14:48

## 2024-09-19 NOTE — EMTALA/ACUTE CARE TRANSFER
[facility] EMERGENCY DEPARTMENT  [address] [address]  Dept: [phone]      EMTALA TRANSFER CONSENT    NAME Radha Strickland                                         1970                              MRN [id number]    I have been informed of my rights regarding examination, treatment, and transfer   by Dr. Pinky Swanson DO    Benefits: Specialized equipment and/or services available at the receiving facility (Include comment)________________________ (inpt psych)    Risks: Potential for delay in receiving treatment, Potential deterioration of medical condition, Increased discomfort during transfer, Possible worsening of condition or death during transfer      Consent for Transfer:  I acknowledge that my medical condition has been evaluated and explained to me by the emergency department physician or other qualified medical person and/or my attending physician, who has recommended that I be transferred to the service of  Accepting Physician: Dr. Fadi Hollingsworth at Accepting Facility Name, City & State : [facility], [place]. The above potential benefits of such transfer, the potential risks associated with such transfer, and the probable risks of not being transferred have been explained to me, and I fully understand them.  The doctor has explained that, in my case, the benefits of transfer outweigh the risks.  I agree to be transferred.    I authorize the performance of emergency medical procedures and treatments upon me in both transit and upon arrival at the receiving facility.  Additionally, I authorize the release of any and all medical records to the receiving facility and request they be transported with me, if possible.  I understand that the safest mode of transportation during a medical emergency is an ambulance and that the Hospital advocates the use of this mode of transport. Risks of traveling to the receiving facility by car, including absence of  medical control, life sustaining equipment, such as oxygen, and medical personnel has been explained to me and I fully understand them.    (JEAN PAUL CORRECT BOX BELOW)  [  ]  I consent to the stated transfer and to be transported by ambulance/helicopter.  [  ]  I consent to the stated transfer, but refuse transportation by ambulance and accept full responsibility for my transportation by car.  I understand the risks of non-ambulance transfers and I exonerate the Hospital and its staff from any deterioration in my condition that results from this refusal.    X___________________________________________    DATE  24  TIME________  Signature of patient or legally responsible individual signing on patient behalf           RELATIONSHIP TO PATIENT_________________________          Provider Certification    NAME Radha Strickland                                         1970                              MRN [id number]    A medical screening exam was performed on the above named patient.  Based on the examination:    Condition Necessitating Transfer The encounter diagnosis was Depression with suicidal ideation.    Patient Condition: The patient has been stabilized such that within reasonable medical probability, no material deterioration of the patient condition or the condition of the unborn child(willie) is likely to result from the transfer    Reason for Transfer: Level of Care needed not available at this facility (inpt psych)    Transfer Requirements: Facility [facility]   Space available and qualified personnel available for treatment as acknowledged by Demetrio [phone]  Agreed to accept transfer and to provide appropriate medical treatment as acknowledged by       Dr. Fadi Hollingsworth  Appropriate medical records of the examination and treatment of the patient are provided at the time of transfer   STAFF INITIAL WHEN COMPLETED _______  Transfer will be performed by qualified personnel from [facility]  and  appropriate transfer equipment as required, including the use of necessary and appropriate life support measures.    Provider Certification: I have examined the patient and explained the following risks and benefits of being transferred/refusing transfer to the patient/family:  General risk, such as traffic hazards, adverse weather conditions, rough terrain or turbulence, possible failure of equipment (including vehicle or aircraft), or consequences of actions of persons outside the control of the transport personnel      Based on these reasonable risks and benefits to the patient and/or the unborn child(willie), and based upon the information available at the time of the patient’s examination, I certify that the medical benefits reasonably to be expected from the provision of appropriate medical treatments at another medical facility outweigh the increasing risks, if any, to the individual’s medical condition, and in the case of labor to the unborn child, from effecting the transfer.    X____________________________________________ DATE 09/19/24        TIME_______      ORIGINAL - SEND TO MEDICAL RECORDS   COPY - SEND WITH PATIENT DURING TRANSFER

## 2024-09-19 NOTE — ED NOTES
Patients belongings in [facility] locker. Purse and bag with patients visitor to go home with him     Monica Tejada  09/19/24 0392

## 2024-09-19 NOTE — ED NOTES
201 Signed and Dated- Rights explained, bed search explained- Faxed to [facility] per request- Original on patient chart.     Cedric Ellis  Crisis Intervention Specialist II  09/19/24

## 2024-09-19 NOTE — ED NOTES
Patient made aware of acceptance to [facility], and is in agreement with plan of care.    Patient to be updated when transport time established.    SDM P/U 2070    Vicki at [facility] notified      Cedric Ellis  Crisis Intervention Specialist II  09/19/24

## 2024-09-19 NOTE — ED NOTES
Referral faxed to [facility] per request.    Cedric Ellis  Crisis Intervention Specialist II  09/19/24

## 2024-09-19 NOTE — ED NOTES
Insurance Authorization for admission:   Phone call placed to Quang Cedeño.  Phone number: [phone].     Spoke to Gloria QUIJANO     5 days approved.  Level of care: Acute Inpt  (201).  Review on TBD.   Authorization # to be obtained by accepting facility upon arrival.        Eligibility Verification System checked - ([phone]).  Online system / automated system indicates: Active with [facility]ang Cedeño (ID# [id number])    Insurance Authorization for Transportation:    To be completed, if needed, once transportation arranged.     Mary Encarnacion, ROSARIO  09/19/24    2417

## 2024-09-19 NOTE — ED NOTES
Patient is accepted at [place].  Patient is accepted by Dr. Fadi Hollingsworth per Nuria in Admissions.    Transportation is arranged with [facility].  Transportation is scheduled for TBD.  Patient may go to the floor at No time constraints.      Cedric Ellis  Crisis Intervention Specialist II  09/19/24

## 2024-09-19 NOTE — ED NOTES
Patient is a 54 year old female presenting to the emergency department with increased anxiety, depression and suicidal ideations. Patient says she is overwhelmed living with her daughter and they dont have any money. She says her bills keep piling up and she is about to lose her car. She is at bedside with her ex boyfriend who is very supportive. Patient says she is bipolar and stopped her medications because she was getting too drowsy. She has a suicidal plan to slice her wrists. She says she is tried of being there for everyone else and then when she is in crisis, she has no support. She says people are writing bad things about her on social media and feels she cant escape. She is extremely anxious and wants help but shes afraid to be inpatient but knows she needs it. She has been admitted in the past at [facility] and is connected with a psychiatrist-Cesar Helm at [facility] as well. She does not have a therapist at present and says she had one in the past that really helped her but now thats shes on medical assistance they wont accept that insurance. Patient is agreeable to sign a 201 for treatment and is requesting [facility] or [facility] for treatment.

## 2024-09-19 NOTE — ED PROVIDER NOTES
1. Depression with suicidal ideation      ED Disposition       ED Disposition   Transfer to Behavioral Health Condition   --    Date/Time   Thu Sep 19, 2024  2:32 PM    Comment                  Assessment & Plan       Medical Decision Making  Depression and si with plan-will consult crisis for mental health evaluation/treatment.     Amount and/or Complexity of Data Reviewed  Labs: ordered.    Risk  Decision regarding hospitalization.                     Medications   LORazepam (ATIVAN) tablet 1 mg (has no administration in time range)       History of Present Illness         Psychiatric Evaluation  Presenting symptoms: depression and suicidal thoughts    Presenting symptoms: no agitation and no hallucinations    Degree of incapacity (severity):  Moderate  Onset quality:  Gradual  Duration: several days.  Timing:  Constant  Progression:  Worsening  Context: recent medication change    Context comment:  Multiple stressors  Treatment compliance:  Untreated  Time since last psychoactive medication taken:  1 week  Relieved by:  Nothing  Worsened by:  Family interactions  Associated symptoms: no abdominal pain, no appetite change, no chest pain and no fatigue        Review of Systems   Constitutional:  Negative for activity change, appetite change, fatigue and fever.   HENT:  Negative for congestion, dental problem, ear pain, rhinorrhea and sore throat.    Eyes:  Negative for pain and redness.   Respiratory:  Negative for chest tightness, shortness of breath and wheezing.    Cardiovascular:  Negative for chest pain and palpitations.   Gastrointestinal:  Negative for abdominal pain, blood in stool, constipation, diarrhea, nausea and vomiting.   Endocrine: Negative for cold intolerance and heat intolerance.   Genitourinary:  Negative for dysuria, frequency and hematuria.   Musculoskeletal:  Negative for arthralgias and myalgias.   Skin:  Negative for color change, pallor and rash.   Neurological:  Negative for weakness  and numbness.   Hematological:  Does not bruise/bleed easily.   Psychiatric/Behavioral:  Positive for dysphoric mood and suicidal ideas. Negative for agitation and hallucinations.            Objective     ED Triage Vitals   Temperature Pulse Blood Pressure Respirations SpO2 Patient Position - Orthostatic VS   09/19/24 1348 09/19/24 1352 09/19/24 1352 09/19/24 1352 09/19/24 1352 09/19/24 1352   98.3 °F (36.8 °C) 75 102/62 22 96 % Sitting      Temp Source Heart Rate Source BP Location FiO2 (%) Pain Score    09/19/24 1348 09/19/24 1352 09/19/24 1352 -- 09/19/24 1352    Oral Monitor Right arm  9        Physical Exam  Constitutional:       Appearance: She is well-developed.   HENT:      Head: Normocephalic and atraumatic.   Eyes:      Extraocular Movements: EOM normal.      Pupils: Pupils are equal, round, and reactive to light.   Neck:      Vascular: No JVD.      Trachea: No tracheal deviation.   Cardiovascular:      Rate and Rhythm: Normal rate and regular rhythm.   Pulmonary:      Effort: No tachypnea, accessory muscle usage or respiratory distress.   Abdominal:      General: There is no distension.   Musculoskeletal:      Right lower leg: Normal.      Left lower leg: Normal.   Skin:     General: Skin is warm.      Capillary Refill: Capillary refill takes less than 2 seconds.   Neurological:      General: No focal deficit present.      Mental Status: She is alert and oriented to person, place, and time.   Psychiatric:         Attention and Perception: Attention and perception normal.         Mood and Affect: Mood is depressed. Affect is flat.         Speech: Speech normal.         Behavior: Behavior is withdrawn. Behavior is cooperative.         Thought Content: Thought content includes suicidal ideation.         Labs Reviewed   POCT ALCOHOL BREATH TEST - Normal       Result Value    EXTBreath Alcohol 0.00     RAPID DRUG SCREEN, URINE     No orders to display       Procedures    ED Medication and Procedure Management    Prior to Admission Medications   Prescriptions Last Dose Informant Patient Reported? Taking?   Cyanocobalamin (VITAMIN B 12 PO)  Self Yes No   Sig: Take 1 tablet by mouth daily    Humira Pen-CD/UC/HS Starter 80 MG/0.8ML PNKT  Self Yes No   Ustekinumab (STELARA IV)  Self Yes No   Sig: Infuse into a venous catheter every 56 days Every 8 weeks; home injection  Last dose 3/17/2021   Patient not taking: Reported on 8/17/2022   apixaban (ELIQUIS) 5 mg  Self No No   Sig: Take 1 tablet (5 mg total) by mouth 2 (two) times a day for 7 days, THEN 1 tablet (5 mg total) 2 (two) times a day for 23 days.   Patient taking differently: Take 1 tablet (5 mg total) by mouth 2 (two) times a day for 7 days, THEN 1 tablet (5 mg total) 2 (two) times a day for   apixaban (Eliquis) 2.5 mg   Yes Yes   Sig: Take 2.5 mg by mouth 2 (two) times a day   cholecalciferol (VITAMIN D3) 1,000 units tablet  Self Yes No   Sig: Take 2,000 Units by mouth daily   ferrous sulfate 325 (65 Fe) mg tablet  Self Yes Yes   Sig: Take 325 mg by mouth daily with breakfast   hydrOXYzine HCL (ATARAX) 25 mg tablet   Yes Yes   Sig: Take 25 mg by mouth every 8 (eight) hours as needed for anxiety   ibuprofen (MOTRIN) 800 mg tablet  Self No No   Sig: Take 1 tablet (800 mg total) by mouth 3 (three) times a day   levothyroxine 100 mcg tablet  Self Yes Yes   Sig: Take 100 mcg by mouth daily   pramipexole (MIRAPEX) 0.5 mg tablet  Self Yes Yes   Sig: Take 0.5 mg by mouth 2 (two) times a day    topiramate (TOPAMAX) 50 MG tablet  Self Yes No   Sig: Take 50 mg by mouth every 12 (twelve) hours   traZODone (DESYREL) 50 mg tablet   Yes Yes   Sig: Take 50 mg by mouth daily at bedtime      Facility-Administered Medications: None     Patient's Medications   Discharge Prescriptions    No medications on file     No discharge procedures on file.     Terrance Almaraz MD  09/19/24 [id number]

## (undated) DEVICE — ELECTRODE BLADE MOD E-Z CLEAN  2.75IN 7CM -0012AM

## (undated) DEVICE — BLADE SHAVER EXCALIBUR 4MM 13CM COOLCUT

## (undated) DEVICE — SUT VICRYL PLUS 3-0 RB-1 CR/8 18 IN VCP713D

## (undated) DEVICE — BETHLEHEM UNIVERSAL  ARTHRO PK: Brand: CARDINAL HEALTH

## (undated) DEVICE — ACE WRAP 6 IN XL STERILE

## (undated) DEVICE — NEEDLE 18 G X 1 1/2 SAFETY

## (undated) DEVICE — PROXIMATE SKIN STAPLERS (35 WIDE) CONTAINS 35 STAINLESS STEEL STAPLES (FIXED HEAD): Brand: PROXIMATE

## (undated) DEVICE — GLOVE INDICATOR UNDERGLOVE SZ 7.5 GREEN

## (undated) DEVICE — PREP SURGICAL PURPREP 26ML

## (undated) DEVICE — SPECIMEN CONTAINER STERILE PEEL PACK

## (undated) DEVICE — TUBING ARTHROSCOPIC WAVE  MAIN PUMP

## (undated) DEVICE — SYRINGE 5ML LL

## (undated) DEVICE — DISPOSABLE EQUIPMENT COVER: Brand: SMALL TOWEL DRAPE

## (undated) DEVICE — 3M™ STERI-STRIP™ REINFORCED ADHESIVE SKIN CLOSURES, R1547, 1/2 IN X 4 IN (12 MM X 100 MM), 6 STRIPS/ENVELOPE: Brand: 3M™ STERI-STRIP™

## (undated) DEVICE — ABDOMINAL PAD: Brand: DERMACEA

## (undated) DEVICE — GAUZE SPONGES,16 PLY: Brand: CURITY

## (undated) DEVICE — STRAP LITHOTOMY CANDY CANE

## (undated) DEVICE — ROSEBUD DISSECTORS: Brand: DEROYAL

## (undated) DEVICE — GLOVE INDICATOR UNDERGLOVE SZ 8 GREEN

## (undated) DEVICE — GLOVE INDICATOR PI UNDERGLOVE SZ 8 BLUE

## (undated) DEVICE — TIBURON SPLIT SHEET: Brand: CONVERTORS

## (undated) DEVICE — INTENDED FOR TISSUE SEPARATION, AND OTHER PROCEDURES THAT REQUIRE A SHARP SURGICAL BLADE TO PUNCTURE OR CUT.: Brand: BARD-PARKER ® CARBON RIB-BACK BLADES

## (undated) DEVICE — 3M™ STERI-DRAPE™ U-DRAPE 1015: Brand: STERI-DRAPE™

## (undated) DEVICE — SPECIMEN TISSUE TRAP 12MM RIGID

## (undated) DEVICE — DRAPE EQUIPMENT RF WAND

## (undated) DEVICE — 4-PORT MANIFOLD: Brand: NEPTUNE 2

## (undated) DEVICE — DRAPE MICROSCOPE OPMI PENTERO

## (undated) DEVICE — SYRINGE 10ML LL

## (undated) DEVICE — DRESSING XEROFORM 5 X 9

## (undated) DEVICE — PADDING CAST 6IN COTTON STRL

## (undated) DEVICE — GLOVE SRG BIOGEL 8

## (undated) DEVICE — STOCKINETTE,IMPERVIOUS,12X48,STERILE: Brand: MEDLINE

## (undated) DEVICE — READY WET SKIN SCRUB TRAY-LF: Brand: MEDLINE INDUSTRIES, INC.

## (undated) DEVICE — TOWEL SURG XR DETECT GREEN STRL RFD

## (undated) DEVICE — JP PERF DRN SIL FLT 7MM FULL: Brand: CARDINAL HEALTH

## (undated) DEVICE — IV CATH 14 G X 3 1/4 IN

## (undated) DEVICE — 3M™ STERI-STRIP™ COMPOUND BENZOIN TINCTURE 40 BAGS/CARTON 4 CARTONS/CASE C1544: Brand: 3M™ STERI-STRIP™

## (undated) DEVICE — LIGHT GLOVE GREEN

## (undated) DEVICE — 3M™ TEGADERM™ TRANSPARENT FILM DRESSING FRAME STYLE, 1626W, 4 IN X 4-3/4 IN (10 CM X 12 CM), 50/CT 4CT/CASE: Brand: 3M™ TEGADERM™

## (undated) DEVICE — TOOL T12MH25 LEGEND 12CM 2.5MM MH: Brand: MIDAS REX ™

## (undated) DEVICE — DISTRACTION SCREW 12MM DISP

## (undated) DEVICE — JACKSON-PRATT 100CC BULB RESERVOIR: Brand: CARDINAL HEALTH

## (undated) DEVICE — MINOR PROCEDURE DRAPE: Brand: CONVERTORS

## (undated) DEVICE — NEEDLE SPINAL18G X 3.5 IN QUINCKE

## (undated) DEVICE — LIGHT HANDLE COVER SLEEVE DISP BLUE STELLAR

## (undated) DEVICE — TELFA NON-ADHERENT ABSORBENT DRESSING: Brand: TELFA

## (undated) DEVICE — CONMED SUCTION CONNECTING TUBING, 20' LONG (6.1 M), 9/32" I.D. (7.1 MM): Brand: CONMED

## (undated) DEVICE — PAD CAST 6 IN COTTON NON STERILE

## (undated) DEVICE — MAGNETIC INSTRUMENT PAD 16" X 20"; LARGE; DISPOSABLE: Brand: CARDINAL HEALTH

## (undated) DEVICE — SUT ETHILON 3-0 PS-1 18 IN 1663H

## (undated) DEVICE — FLOSEAL HEMOSTATIC MATRIX, 5 ML: Brand: FLOSEAL

## (undated) DEVICE — COBAN 6 IN STERILE

## (undated) DEVICE — DRESSING XEROFORM 2 X 2

## (undated) DEVICE — SNAP KOVER: Brand: UNBRANDED

## (undated) DEVICE — INTENDED FOR TISSUE SEPARATION, AND OTHER PROCEDURES THAT REQUIRE A SHARP SURGICAL BLADE TO PUNCTURE OR CUT.: Brand: BARD-PARKER SAFETY BLADES SIZE 15, STERILE

## (undated) DEVICE — NEEDLE 21 G X 1 1/2 SAFETY

## (undated) DEVICE — BETHLEHEM UNIVERSAL SPINE, KIT: Brand: CARDINAL HEALTH

## (undated) DEVICE — UMBILICAL TAPE: Brand: DEROYAL

## (undated) DEVICE — SPONGE PVP SCRUB WING STERILE

## (undated) DEVICE — GLOVE SRG BIOGEL 7.5

## (undated) DEVICE — AMBIENT COVAC 50 IFS: Brand: COBLATION

## (undated) DEVICE — ACE WRAP 6 IN STERILE